# Patient Record
Sex: MALE | Race: WHITE | NOT HISPANIC OR LATINO | Employment: OTHER | URBAN - METROPOLITAN AREA
[De-identification: names, ages, dates, MRNs, and addresses within clinical notes are randomized per-mention and may not be internally consistent; named-entity substitution may affect disease eponyms.]

---

## 2022-01-26 ENCOUNTER — LAB (OUTPATIENT)
Dept: LAB | Facility: HOSPITAL | Age: 72
End: 2022-01-26

## 2022-01-26 ENCOUNTER — OFFICE VISIT (OUTPATIENT)
Dept: FAMILY MEDICINE CLINIC | Facility: CLINIC | Age: 72
End: 2022-01-26

## 2022-01-26 VITALS
HEART RATE: 78 BPM | BODY MASS INDEX: 29.66 KG/M2 | RESPIRATION RATE: 18 BRPM | SYSTOLIC BLOOD PRESSURE: 136 MMHG | HEIGHT: 72 IN | TEMPERATURE: 97.8 F | OXYGEN SATURATION: 99 % | DIASTOLIC BLOOD PRESSURE: 70 MMHG | WEIGHT: 219 LBS

## 2022-01-26 DIAGNOSIS — F32.A DEPRESSION, UNSPECIFIED DEPRESSION TYPE: ICD-10-CM

## 2022-01-26 DIAGNOSIS — R21 RASH: ICD-10-CM

## 2022-01-26 DIAGNOSIS — N40.0 BENIGN PROSTATIC HYPERPLASIA, UNSPECIFIED WHETHER LOWER URINARY TRACT SYMPTOMS PRESENT: Primary | ICD-10-CM

## 2022-01-26 DIAGNOSIS — I10 PRIMARY HYPERTENSION: ICD-10-CM

## 2022-01-26 PROCEDURE — 99204 OFFICE O/P NEW MOD 45 MIN: CPT | Performed by: STUDENT IN AN ORGANIZED HEALTH CARE EDUCATION/TRAINING PROGRAM

## 2022-01-26 RX ORDER — TRIAMCINOLONE ACETONIDE 0.25 MG/G
1 OINTMENT TOPICAL 2 TIMES DAILY
Qty: 1 EACH | Refills: 0 | Status: SHIPPED | OUTPATIENT
Start: 2022-01-26 | End: 2022-06-21

## 2022-01-26 RX ORDER — OLMESARTAN MEDOXOMIL 40 MG/1
TABLET ORAL
COMMUNITY
Start: 2010-01-01 | End: 2022-06-13 | Stop reason: SDUPTHER

## 2022-01-26 RX ORDER — TRAMADOL HYDROCHLORIDE 50 MG/1
TABLET ORAL
COMMUNITY
End: 2022-01-26

## 2022-01-26 RX ORDER — TADALAFIL 5 MG/1
TABLET ORAL
COMMUNITY
End: 2022-06-21

## 2022-01-26 RX ORDER — ESZOPICLONE 3 MG/1
TABLET, FILM COATED ORAL
COMMUNITY
Start: 2021-04-01 | End: 2022-01-26

## 2022-01-26 RX ORDER — TAMSULOSIN HYDROCHLORIDE 0.4 MG/1
CAPSULE ORAL
COMMUNITY
Start: 2015-01-01 | End: 2022-06-21 | Stop reason: SDUPTHER

## 2022-01-26 RX ORDER — TRIAMCINOLONE ACETONIDE 1 MG/G
CREAM TOPICAL
COMMUNITY
Start: 2019-01-01 | End: 2022-01-26

## 2022-01-26 RX ORDER — SERTRALINE HYDROCHLORIDE 100 MG/1
TABLET, FILM COATED ORAL
COMMUNITY
Start: 2020-01-01 | End: 2022-06-21

## 2022-01-26 RX ORDER — CEPHALEXIN 500 MG/1
500 CAPSULE ORAL 2 TIMES DAILY
Qty: 14 CAPSULE | Refills: 0 | Status: SHIPPED | OUTPATIENT
Start: 2022-01-26 | End: 2022-06-13

## 2022-01-26 NOTE — PROGRESS NOTES
New Patient Office Visit      Patient Name: Justus Bishop  : 1950   MRN: [id number]     Chief Complaint:  staff infection (fell in briar patch/ poison oak 6 mnths ago.)     History of Present Illness:   He just moved to [place] in December.     Rash:  He has a rash on his chin and face that he was prescribed by dermatology. He says this started when he got into poison oak on his face about 7 months ago. He was also given mupirocin which he states he cannot take as it gave his allergic reaction. Says his rash is way better now than it was. He is currently going through a divorce and with a lot of stress. Says the rash has improved as the stress improved. Says he has been on bactrim and doxycycline on and off for two years for skin issues he believes to be poison oak related.     Says he has a high red and white blood cell count and has underwent phlebotomy in the past. Has not seen hematology.     cristy wallace pcp [facility]. Request records.      Taking lunesta for insomnia. Discussed I do not prescribe this medication given it's potential dangerous side effects and he says he is okay without it as he has not taken it the past few days as the seroquel helps him sleep.     Asthma  Uses breo occasionally. Denies copd    htn  Taking benicar for htn. Checks at home and says it has been stable in 130s. No side effects from that medication.     Taking ssri (for 2 years) and seroquel (less than four months). He says he was started on this medication to keep his mood in control as he was going through a divorce.    He has a history of opioid dependence and says he had an issue with alcohol in the past. Declines any alcohol use or substance use at this time. He did not undergo intensive treatment but says he stopped on his own. This was 2 years ago being on opioid treatment for chronic low back pain. He denies any history of hallucinations or delusions. He denies any side effects  "from the [facility].   Saw dr martinez for psychiatry. Request records.     He was diagnosed with essential tremor and was placed on primidone which gave him thoughts of suicide so he stopped taking. He denies any suicidal or homicidal thoughts at this time.     He also takes cialis and flomax for bph. He did have a [facility] health doctor in [place] and needs to establish with one here.         Subjective        History reviewed. No pertinent past medical history.    History reviewed. No pertinent surgical history.    History reviewed. No pertinent family history.    Social History     Socioeconomic History   • Marital status:           Current Outpatient Medications:   •  Fluticasone Furoate-Vilanterol (Breo Ellipta) 200-25 MCG/INH inhaler, Breo Ellipta 200 mcg-25 mcg/dose powder for inhalation, Disp: , Rfl:   •  mupirocin (BACTROBAN) 2 % ointment, mupirocin 2 % topical ointment, Disp: , Rfl:   •  olmesartan (BENICAR) 40 MG tablet, olmesartan 40 mg tablet, Disp: , Rfl:   •  QUETIAPINE FUMARATE ER PO, , Disp: , Rfl:   •  sertraline (ZOLOFT) 100 MG tablet, sertraline 100 mg tablet, Disp: , Rfl:   •  tadalafil (CIALIS) 5 MG tablet, tadalafil 5 mg tablet  Take 1 tablet(s) every day by oral route for 90 days., Disp: , Rfl:   •  tamsulosin (FLOMAX) 0.4 MG capsule 24 hr capsule, tamsulosin 0.4 mg capsule, Disp: , Rfl:   •  cephalexin (Keflex) 500 MG capsule, Take 1 capsule by mouth 2 (Two) Times a Day., Disp: 14 capsule, Rfl: 0  •  triamcinolone (KENALOG) 0.025 % ointment, Apply 1 application topically to the appropriate area as directed 2 (Two) Times a Day., Disp: 1 each, Rfl: 0    No Known Allergies    Objective     Physical Exam:  Vitals:    01/26/22 1105   BP: 136/70   Pulse: 78   Resp: 18   Temp: 97.8 °F (36.6 °C)   SpO2: 99%   Weight: 99.3 kg (219 lb)   Height: 182.9 cm (72\")      Body mass index is 29.7 kg/m².     Physical Exam  Constitutional:       General: He is not in acute distress.     Appearance: Normal " appearance.   HENT:      Head: Normocephalic and atraumatic.   Eyes:      Extraocular Movements: Extraocular movements intact.   Cardiovascular:      Rate and Rhythm: Normal rate and regular rhythm.      Heart sounds: No murmur heard.      Pulmonary:      Effort: Pulmonary effort is normal. No respiratory distress.      Breath sounds: Normal breath sounds.   Abdominal:      General: Abdomen is flat.   Musculoskeletal:         General: No swelling.      Cervical back: Normal range of motion.   Skin:     Comments: He has some deep excoriations of his chin and buccal area bilaterally with erythema. Several excoriations appear to have some purulent yellowing.No cyst or abscess.    Neurological:      Mental Status: He is alert.   Psychiatric:         Mood and Affect: Mood normal.              Assessment / Plan      Assessment/Plan:   Diagnoses and all orders for this visit:    1. Benign prostatic hyperplasia, unspecified whether lower urinary tract symptoms present (Primary)  -     Ambulatory Referral to Urology  -     Comprehensive Metabolic Panel; Future    2. Rash  -     Cancel: Ambulatory Referral to Dermatology  -     Ambulatory Referral to Dermatology    3. Primary hypertension  -     CBC (No Diff); Future  -     Lipid Panel; Future  -     TSH Rfx On Abnormal To Free T4; Future    4. Depression, unspecified depression type  -     Ambulatory Referral to Behavioral Health    Other orders  -     cephalexin (Keflex) 500 MG capsule; Take 1 capsule by mouth 2 (Two) Times a Day.  Dispense: 14 capsule; Refill: 0  -     triamcinolone (KENALOG) 0.025 % ointment; Apply 1 application topically to the appropriate area as directed 2 (Two) Times a Day.  Dispense: 1 each; Refill: 0         Return in about 1 month (around 2/26/2022).       Misty Mai D.O.  [facility] [place]

## 2022-01-27 LAB
ALBUMIN SERPL-MCNC: 4 G/DL (ref 3.5–5.2)
ALBUMIN/GLOB SERPL: 1.2 G/DL
ALP SERPL-CCNC: 78 U/L (ref 39–117)
ALT SERPL-CCNC: 14 U/L (ref 1–41)
AST SERPL-CCNC: 12 U/L (ref 1–40)
BILIRUB SERPL-MCNC: 0.3 MG/DL (ref 0–1.2)
BUN SERPL-MCNC: 12 MG/DL (ref 8–23)
BUN/CREAT SERPL: 13.6 (ref 7–25)
CALCIUM SERPL-MCNC: 9.5 MG/DL (ref 8.6–10.5)
CHLORIDE SERPL-SCNC: 106 MMOL/L (ref 98–107)
CHOLEST SERPL-MCNC: 216 MG/DL (ref 0–200)
CO2 SERPL-SCNC: 24.8 MMOL/L (ref 22–29)
CREAT SERPL-MCNC: 0.88 MG/DL (ref 0.76–1.27)
ERYTHROCYTE [DISTWIDTH] IN BLOOD BY AUTOMATED COUNT: 13 % (ref 12.3–15.4)
GLOBULIN SER CALC-MCNC: 3.4 GM/DL
GLUCOSE SERPL-MCNC: 95 MG/DL (ref 65–99)
HCT VFR BLD AUTO: 46.9 % (ref 37.5–51)
HDLC SERPL-MCNC: 42 MG/DL (ref 40–60)
HGB BLD-MCNC: 16.4 G/DL (ref 13–17.7)
LDLC SERPL CALC-MCNC: 149 MG/DL (ref 0–100)
MCH RBC QN AUTO: 31.6 PG (ref 26.6–33)
MCHC RBC AUTO-ENTMCNC: 35 G/DL (ref 31.5–35.7)
MCV RBC AUTO: 90.4 FL (ref 79–97)
PLATELET # BLD AUTO: 418 10*3/MM3 (ref 140–450)
POTASSIUM SERPL-SCNC: 4.2 MMOL/L (ref 3.5–5.2)
PROT SERPL-MCNC: 7.4 G/DL (ref 6–8.5)
RBC # BLD AUTO: 5.19 10*6/MM3 (ref 4.14–5.8)
SODIUM SERPL-SCNC: 137 MMOL/L (ref 136–145)
TRIGL SERPL-MCNC: 139 MG/DL (ref 0–150)
TSH SERPL DL<=0.005 MIU/L-ACNC: 1.12 UIU/ML (ref 0.27–4.2)
VLDLC SERPL CALC-MCNC: 25 MG/DL (ref 5–40)
WBC # BLD AUTO: 12.91 10*3/MM3 (ref 3.4–10.8)

## 2022-02-14 ENCOUNTER — TELEPHONE (OUTPATIENT)
Dept: FAMILY MEDICINE CLINIC | Facility: CLINIC | Age: 72
End: 2022-02-14

## 2022-02-14 NOTE — TELEPHONE ENCOUNTER
----- Message from Misty Mai DO sent at 2/11/2022  8:20 AM EST -----  Regarding: FW: Two Requests      ----- Message -----  From: Lulu Silva CMA  Sent: 2/10/2022   4:21 PM EST  To: Misty Mai DO  Subject: FW: Two Requests                                   ----- Message -----  From: Justus Bishop  Sent: 2/10/2022   4:01 PM EST  To: [employer]  Subject: Two Requests                                     Felecia Mai,    I a having issues with scheduling the [facility], playing phone tag, and I misplaced the callback number. Please re request that they reach out to me.    Also, the Perrigo ointment has helped, if a refill is possible, please.    I have seen Hamilton Csatle at [facility], and healing has improved, thanks. Also I am on track for a Urology Appointment at the end of the month.    Thanks so much for your help!    Jordan Bishop

## 2022-06-13 ENCOUNTER — OFFICE VISIT (OUTPATIENT)
Dept: FAMILY MEDICINE CLINIC | Facility: CLINIC | Age: 72
End: 2022-06-13

## 2022-06-13 ENCOUNTER — LAB (OUTPATIENT)
Dept: LAB | Facility: HOSPITAL | Age: 72
End: 2022-06-13

## 2022-06-13 VITALS
WEIGHT: 230 LBS | SYSTOLIC BLOOD PRESSURE: 146 MMHG | TEMPERATURE: 99.5 F | BODY MASS INDEX: 31.15 KG/M2 | OXYGEN SATURATION: 96 % | HEIGHT: 72 IN | HEART RATE: 81 BPM | RESPIRATION RATE: 16 BRPM | DIASTOLIC BLOOD PRESSURE: 90 MMHG

## 2022-06-13 DIAGNOSIS — R60.9 SWELLING: Primary | ICD-10-CM

## 2022-06-13 DIAGNOSIS — R60.9 SWELLING: ICD-10-CM

## 2022-06-13 DIAGNOSIS — R79.9 ABNORMAL FINDING OF BLOOD CHEMISTRY, UNSPECIFIED: ICD-10-CM

## 2022-06-13 DIAGNOSIS — R06.02 SOB (SHORTNESS OF BREATH): ICD-10-CM

## 2022-06-13 DIAGNOSIS — R60.0 LOCALIZED EDEMA: ICD-10-CM

## 2022-06-13 LAB
ALBUMIN SERPL-MCNC: 4.1 G/DL (ref 3.5–5.2)
ALBUMIN UR-MCNC: 5.3 MG/DL
ALBUMIN/GLOB SERPL: 1.2 G/DL
ALP SERPL-CCNC: 77 U/L (ref 39–117)
ALT SERPL W P-5'-P-CCNC: 16 U/L (ref 1–41)
ANION GAP SERPL CALCULATED.3IONS-SCNC: 11.8 MMOL/L (ref 5–15)
AST SERPL-CCNC: 14 U/L (ref 1–40)
BILIRUB SERPL-MCNC: 0.5 MG/DL (ref 0–1.2)
BUN SERPL-MCNC: 11 MG/DL (ref 8–23)
BUN/CREAT SERPL: 11.5 (ref 7–25)
CALCIUM SPEC-SCNC: 9.2 MG/DL (ref 8.6–10.5)
CHLORIDE SERPL-SCNC: 101 MMOL/L (ref 98–107)
CO2 SERPL-SCNC: 24.2 MMOL/L (ref 22–29)
CREAT SERPL-MCNC: 0.96 MG/DL (ref 0.76–1.27)
CREAT UR-MCNC: 163.4 MG/DL
DEPRECATED RDW RBC AUTO: 47.3 FL (ref 37–54)
EGFRCR SERPLBLD CKD-EPI 2021: 84.5 ML/MIN/1.73
ERYTHROCYTE [DISTWIDTH] IN BLOOD BY AUTOMATED COUNT: 14.9 % (ref 12.3–15.4)
GLOBULIN UR ELPH-MCNC: 3.4 GM/DL
GLUCOSE SERPL-MCNC: 86 MG/DL (ref 65–99)
HBA1C MFR BLD: 5.8 % (ref 4.8–5.6)
HCT VFR BLD AUTO: 48.1 % (ref 37.5–51)
HGB BLD-MCNC: 16.1 G/DL (ref 13–17.7)
MCH RBC QN AUTO: 29.3 PG (ref 26.6–33)
MCHC RBC AUTO-ENTMCNC: 33.5 G/DL (ref 31.5–35.7)
MCV RBC AUTO: 87.6 FL (ref 79–97)
MICROALBUMIN/CREAT UR: 32.4 MG/G
PLATELET # BLD AUTO: 433 10*3/MM3 (ref 140–450)
PMV BLD AUTO: 9.1 FL (ref 6–12)
POTASSIUM SERPL-SCNC: 3.7 MMOL/L (ref 3.5–5.2)
PROT SERPL-MCNC: 7.5 G/DL (ref 6–8.5)
RBC # BLD AUTO: 5.49 10*6/MM3 (ref 4.14–5.8)
SODIUM SERPL-SCNC: 137 MMOL/L (ref 136–145)
TSH SERPL DL<=0.05 MIU/L-ACNC: 1.85 UIU/ML (ref 0.27–4.2)
WBC NRBC COR # BLD: 12.67 10*3/MM3 (ref 3.4–10.8)

## 2022-06-13 PROCEDURE — 85027 COMPLETE CBC AUTOMATED: CPT

## 2022-06-13 PROCEDURE — 83036 HEMOGLOBIN GLYCOSYLATED A1C: CPT

## 2022-06-13 PROCEDURE — 80053 COMPREHEN METABOLIC PANEL: CPT

## 2022-06-13 PROCEDURE — 99214 OFFICE O/P EST MOD 30 MIN: CPT | Performed by: STUDENT IN AN ORGANIZED HEALTH CARE EDUCATION/TRAINING PROGRAM

## 2022-06-13 PROCEDURE — 82043 UR ALBUMIN QUANTITATIVE: CPT

## 2022-06-13 PROCEDURE — 84443 ASSAY THYROID STIM HORMONE: CPT

## 2022-06-13 PROCEDURE — 82570 ASSAY OF URINE CREATININE: CPT

## 2022-06-13 RX ORDER — OLMESARTAN MEDOXOMIL 40 MG/1
40 TABLET ORAL DAILY
Qty: 30 TABLET | Refills: 2 | Status: SHIPPED | OUTPATIENT
Start: 2022-06-13 | End: 2022-09-20

## 2022-06-13 RX ORDER — ALBUTEROL SULFATE 90 UG/1
2 AEROSOL, METERED RESPIRATORY (INHALATION) EVERY 4 HOURS PRN
Qty: 1 G | Refills: 0 | Status: SHIPPED | OUTPATIENT
Start: 2022-06-13

## 2022-06-13 RX ORDER — CEPHALEXIN 500 MG/1
500 CAPSULE ORAL 4 TIMES DAILY
Qty: 20 CAPSULE | Refills: 0 | Status: SHIPPED | OUTPATIENT
Start: 2022-06-13 | End: 2022-07-21

## 2022-06-13 NOTE — PROGRESS NOTES
"Chief Complaint  Hypertension and Right foot swollen     History of Present Illness    Foot issue  Says for the past couple weeks he has noticed a cut on his toe on the right side. After noticing the cut he noticed he has some swelling inhis right leg more so than left, but left is swollen as well. He says this swelling couldve been present for a long time, he is unsure. There is not pain. No fever. No injury that he is aware of.     He has had stress test and cath in the past and was told he \"looks great\" . Will request records. He says he has some chronic swelling in his legs worse on the right. He declines chest pain, palpitations, no orthopnea. He does have some sob he contributes to asthma.     He says he has a history of asthma. He says he has been out of albuterol. He needs refill on breo and albuterol.     htn  He has not taken his benicar today. He had been on benicar and hctz in the past but did not mention the hctz in the past to me as he did not want to be on it.     He says he [facility] on his face. He was given doxycycline. He has follow up apt with derm in August. He says the skin of his face is improved.         PCP was dr wallace in [place], I did not receive records, will request         The following portions of the patient's history were reviewed and updated as appropriate: allergies, current medications, past family history, past medical history, past social history, past surgical history, and problem list.    OBJECTIVE:  /90   Pulse 81   Temp 99.5 °F (37.5 °C)   Resp 16   Ht 182.9 cm (72\")   Wt 104 kg (230 lb)   SpO2 96%   BMI 31.19 kg/m²       Physical Exam  Constitutional:       General: He is not in acute distress.  HENT:      Head: Normocephalic and atraumatic.   Eyes:      Extraocular Movements: Extraocular movements intact.   Cardiovascular:      Rate and Rhythm: Normal rate and regular rhythm.      Heart sounds: No murmur " heard.  Pulmonary:      Effort: Pulmonary effort is normal. No respiratory distress.      Breath sounds: Normal breath sounds. No wheezing, rhonchi or rales.   Abdominal:      General: Bowel sounds are normal.      Palpations: Abdomen is soft.      Tenderness: There is no abdominal tenderness.   Musculoskeletal:         General: Swelling (bilateral pitting edema is present 1+ . right ankle with more than left) present. No tenderness.      Cervical back: Normal range of motion.   Skin:     Comments: Skin changes of face are present  His 3rd toe on right just below the toe nail there is an abrasion without pus or drainage. There is mild erythema. No foreign bodies present. It is not painful on palpation. The toe is not swollen   Neurological:      General: No focal deficit present.      Mental Status: He is alert. Mental status is at baseline.   Psychiatric:      Comments: Appears anxious                    Assessment and Plan   Diagnoses and all orders for this visit:    1. Swelling (Primary)  -     XR Foot 3+ View Right (In Office)  -     Microalbumin / Creatinine Urine Ratio - Urine, Clean Catch; Future  -     CBC (No Diff); Future  -     Comprehensive Metabolic Panel; Future  -     Hemoglobin A1c; Future  -     TSH Rfx On Abnormal To Free T4; Future  -     Duplex Venous Lower Extremity - Bilateral CAR; Future  -     Adult Transthoracic Echo Complete W/ Cont if Necessary Per Protocol; Future    2. Abnormal finding of blood chemistry, unspecified   -     Hemoglobin A1c; Future    3. Localized edema   -     Duplex Venous Lower Extremity - Bilateral CAR; Future    4. SOB (shortness of breath)  -     Adult Transthoracic Echo Complete W/ Cont if Necessary Per Protocol; Future    Other orders  -     albuterol sulfate  (90 Base) MCG/ACT inhaler; Inhale 2 puffs Every 4 (Four) Hours As Needed for Wheezing.  Dispense: 1 g; Refill: 0  -     Fluticasone Furoate-Vilanterol (Breo Ellipta) 200-25 MCG/INH inhaler; Inhale 1  "puff Daily.  Dispense: 1 each; Refill: 2  -     cephalexin (Keflex) 500 MG capsule; Take 1 capsule by mouth 4 (Four) Times a Day.  Dispense: 20 capsule; Refill: 0  -     olmesartan (BENICAR) 40 MG tablet; Take 1 tablet by mouth Daily.  Dispense: 30 tablet; Refill: 2      Leg swelling  Bilateral and unknown chronicity per patient. Obtain bilateral US. Will check microalbumin, lfts.   Given SOB will send for echo to rule out cardiac cause although he has a history of underlying asthma.    Will give keflex for abrasion of his toe. Will xray right foot given more swelling right than left and follow up on this in one week.     htn  Start back on benicar. Check cmp. F/u bp in one week. Advised him to check bp daily at home and bring to next visit.     Recheck white count today.   Have requested records again, did not receive before.     He denies alcohol use, says he only drinks \" a beer occasionally\".   He declines hiv hepatitis screening.     Discussed with patient I would like to see him back in one week to follow up on these issues.     Return in about 1 week (around 6/20/2022).       Misty Mai D.O.  [facility] [place]     "

## 2022-06-14 DIAGNOSIS — R80.9 PROTEINURIA, UNSPECIFIED TYPE: Primary | ICD-10-CM

## 2022-06-21 ENCOUNTER — LAB (OUTPATIENT)
Dept: LAB | Facility: HOSPITAL | Age: 72
End: 2022-06-21

## 2022-06-21 ENCOUNTER — OFFICE VISIT (OUTPATIENT)
Dept: FAMILY MEDICINE CLINIC | Facility: CLINIC | Age: 72
End: 2022-06-21

## 2022-06-21 VITALS
SYSTOLIC BLOOD PRESSURE: 148 MMHG | RESPIRATION RATE: 16 BRPM | BODY MASS INDEX: 31.69 KG/M2 | TEMPERATURE: 97.9 F | HEART RATE: 90 BPM | DIASTOLIC BLOOD PRESSURE: 88 MMHG | WEIGHT: 234 LBS | HEIGHT: 72 IN | OXYGEN SATURATION: 98 %

## 2022-06-21 DIAGNOSIS — D72.829 LEUKOCYTOSIS, UNSPECIFIED TYPE: ICD-10-CM

## 2022-06-21 DIAGNOSIS — M25.50 ARTHRALGIA, UNSPECIFIED JOINT: ICD-10-CM

## 2022-06-21 DIAGNOSIS — Z23 NEED FOR PNEUMOCOCCAL VACCINE: Primary | ICD-10-CM

## 2022-06-21 LAB
BASOPHILS # BLD AUTO: 0.14 10*3/MM3 (ref 0–0.2)
BASOPHILS NFR BLD AUTO: 1.2 % (ref 0–1.5)
DEPRECATED RDW RBC AUTO: 44.5 FL (ref 37–54)
EOSINOPHIL # BLD AUTO: 0.52 10*3/MM3 (ref 0–0.4)
EOSINOPHIL NFR BLD AUTO: 4.6 % (ref 0.3–6.2)
ERYTHROCYTE [DISTWIDTH] IN BLOOD BY AUTOMATED COUNT: 14.8 % (ref 12.3–15.4)
HCT VFR BLD AUTO: 47.9 % (ref 37.5–51)
HGB BLD-MCNC: 17.2 G/DL (ref 13–17.7)
IMM GRANULOCYTES # BLD AUTO: 0.04 10*3/MM3 (ref 0–0.05)
IMM GRANULOCYTES NFR BLD AUTO: 0.4 % (ref 0–0.5)
LYMPHOCYTES # BLD AUTO: 3.29 10*3/MM3 (ref 0.7–3.1)
LYMPHOCYTES NFR BLD AUTO: 28.9 % (ref 19.6–45.3)
MCH RBC QN AUTO: 30.4 PG (ref 26.6–33)
MCHC RBC AUTO-ENTMCNC: 35.9 G/DL (ref 31.5–35.7)
MCV RBC AUTO: 84.8 FL (ref 79–97)
MONOCYTES # BLD AUTO: 1.45 10*3/MM3 (ref 0.1–0.9)
MONOCYTES NFR BLD AUTO: 12.8 % (ref 5–12)
NEUTROPHILS NFR BLD AUTO: 5.93 10*3/MM3 (ref 1.7–7)
NEUTROPHILS NFR BLD AUTO: 52.1 % (ref 42.7–76)
NRBC BLD AUTO-RTO: 0 /100 WBC (ref 0–0.2)
PATHOLOGY REVIEW: YES
PLATELET # BLD AUTO: 425 10*3/MM3 (ref 140–450)
PMV BLD AUTO: 9.3 FL (ref 6–12)
RBC # BLD AUTO: 5.65 10*6/MM3 (ref 4.14–5.8)
WBC NRBC COR # BLD: 11.37 10*3/MM3 (ref 3.4–10.8)

## 2022-06-21 PROCEDURE — 36415 COLL VENOUS BLD VENIPUNCTURE: CPT

## 2022-06-21 PROCEDURE — 85025 COMPLETE CBC W/AUTO DIFF WBC: CPT

## 2022-06-21 PROCEDURE — 99214 OFFICE O/P EST MOD 30 MIN: CPT | Performed by: STUDENT IN AN ORGANIZED HEALTH CARE EDUCATION/TRAINING PROGRAM

## 2022-06-21 RX ORDER — TAMSULOSIN HYDROCHLORIDE 0.4 MG/1
1 CAPSULE ORAL DAILY
Qty: 30 CAPSULE | Refills: 2 | Status: SHIPPED | OUTPATIENT
Start: 2022-06-21 | End: 2022-07-20 | Stop reason: SDUPTHER

## 2022-06-21 RX ORDER — HYDROCHLOROTHIAZIDE 12.5 MG/1
12.5 TABLET ORAL DAILY
Qty: 30 TABLET | Refills: 1 | Status: SHIPPED | OUTPATIENT
Start: 2022-06-21 | End: 2022-07-21 | Stop reason: SDUPTHER

## 2022-06-21 RX ORDER — ATORVASTATIN CALCIUM 20 MG/1
20 TABLET, FILM COATED ORAL DAILY
Qty: 90 TABLET | Refills: 0 | Status: SHIPPED | OUTPATIENT
Start: 2022-06-21 | End: 2022-09-20

## 2022-06-21 NOTE — PROGRESS NOTES
"Chief Complaint  Follow-up ( )    History of Present Illness      htn  bp not at goal  On benicar 40 mg daily  Will restart hctz       His foot is much improved. 3rd toe with well healed scar.     He does have pain in his joints with some occasional morning stiffness.     hld  Have recommended a statin medication.   He says he was on a medication for this in the past and is okay with starting back on something.     Leg swelling  He says echo is scheduled.   He did not go for doppler US of legs.   Given microalbuminuria he was sent to nephrology. Advised him to call and schedule.    Also advised him to call our office to schedule with urology and psychiatry. He quickly changes the subject when I advise these referrals/test. He says he is not taking his psychiatric medications at all right now and \"doesn't need them\".    He would like to start back on flomax for his prostate.         The following portions of the patient's history were reviewed and updated as appropriate: allergies, current medications, past family history, past medical history, past social history, past surgical history, and problem list.    OBJECTIVE:  /88   Pulse 90   Temp 97.9 °F (36.6 °C)   Resp 16   Ht 182.9 cm (72\")   Wt 106 kg (234 lb)   SpO2 98%   BMI 31.74 kg/m²       Physical Exam  Constitutional:       General: He is not in acute distress.     Appearance: Normal appearance.   HENT:      Head: Normocephalic and atraumatic.   Eyes:      Extraocular Movements: Extraocular movements intact.   Cardiovascular:      Rate and Rhythm: Normal rate and regular rhythm.      Heart sounds: No murmur heard.  Pulmonary:      Effort: Pulmonary effort is normal. No respiratory distress.      Breath sounds: Normal breath sounds.   Abdominal:      General: Abdomen is flat.   Musculoskeletal:      Cervical back: Normal range of motion.      Comments: Has around 1 + pitting edema bilateral lower extremities equal in both legs , seems mildly  " improved since last visit   Skin:     Comments: Healing scar present right 3rd toe no openings or discharge. No warmth redness or swelling. No pain.      Neurological:      General: No focal deficit present.      Mental Status: He is alert.   Psychiatric:         Mood and Affect: Mood normal.                    Assessment and Plan   Diagnoses and all orders for this visit:    1. Need for pneumococcal vaccine (Primary)    2. Leukocytosis, unspecified type  -     CBC & Differential; Future  -     Peripheral Blood Smear; Future    3. Arthralgia, unspecified joint  -     CEDRIC; Future  -     Rheumatoid Factor; Future  -     Sedimentation rate, automated; Future  -     C-reactive protein; Future  -     HLA-B27 Antigen; Future    Other orders  -     hydroCHLOROthiazide (HYDRODIURIL) 12.5 MG tablet; Take 1 tablet by mouth Daily.  Dispense: 30 tablet; Refill: 1  -     tamsulosin (FLOMAX) 0.4 MG capsule 24 hr capsule; Take 1 capsule by mouth Daily.  Dispense: 30 capsule; Refill: 2  -     atorvastatin (Lipitor) 20 MG tablet; Take 1 tablet by mouth Daily.  Dispense: 90 tablet; Refill: 0      Still have not received records from pcp dr wallace in [place] will request again    Has f/u with derm for facial rash, doesn't appear to be infected today. Advised him to avoid steroids which can cause skin scarring and thinning which he already has some degree of chronically and to treat as directed by derm.     Counseled patient on going to his recommended referrals per above.     Return in about 1 month (around 7/21/2022).       Misty Mai D.O.  [facility] [place]

## 2022-06-22 LAB
LAB AP CASE REPORT: NORMAL
PATH REPORT.FINAL DX SPEC: NORMAL

## 2022-06-23 DIAGNOSIS — D72.829 LEUKOCYTOSIS, UNSPECIFIED TYPE: Primary | ICD-10-CM

## 2022-06-27 ENCOUNTER — HOSPITAL ENCOUNTER (OUTPATIENT)
Dept: CARDIOLOGY | Facility: HOSPITAL | Age: 72
Discharge: HOME | End: 2022-06-27

## 2022-06-27 VITALS — BODY MASS INDEX: 31.65 KG/M2 | HEIGHT: 72 IN | WEIGHT: 233.69 LBS

## 2022-06-27 VITALS — BODY MASS INDEX: 31.65 KG/M2 | WEIGHT: 233.69 LBS | HEIGHT: 72 IN

## 2022-06-27 DIAGNOSIS — R06.02 SOB (SHORTNESS OF BREATH): ICD-10-CM

## 2022-06-27 DIAGNOSIS — R60.9 SWELLING: ICD-10-CM

## 2022-06-27 DIAGNOSIS — R60.0 LOCALIZED EDEMA: ICD-10-CM

## 2022-06-27 LAB
BH CV ECHO MEAS - AO MAX PG: 7.5 MMHG
BH CV ECHO MEAS - AO MEAN PG: 4.2 MMHG
BH CV ECHO MEAS - AO ROOT AREA (BSA CORRECTED): 1.6 CM2
BH CV ECHO MEAS - AO ROOT DIAM: 3.6 CM
BH CV ECHO MEAS - AO V2 MAX: 136.5 CM/SEC
BH CV ECHO MEAS - AO V2 VTI: 23.9 CM
BH CV ECHO MEAS - AVA(I,D): 2.8 CM2
BH CV ECHO MEAS - EDV(CUBED): 159.8 ML
BH CV ECHO MEAS - EDV(MOD-SP2): 115 ML
BH CV ECHO MEAS - EDV(MOD-SP4): 123 ML
BH CV ECHO MEAS - EF(MOD-BP): 58 %
BH CV ECHO MEAS - EF(MOD-SP2): 59.1 %
BH CV ECHO MEAS - EF(MOD-SP4): 56.1 %
BH CV ECHO MEAS - ESV(CUBED): 50 ML
BH CV ECHO MEAS - ESV(MOD-SP2): 47 ML
BH CV ECHO MEAS - ESV(MOD-SP4): 54 ML
BH CV ECHO MEAS - FS: 32.1 %
BH CV ECHO MEAS - IVS/LVPW: 1.12 CM
BH CV ECHO MEAS - IVSD: 0.93 CM
BH CV ECHO MEAS - LA DIMENSION: 3.5 CM
BH CV ECHO MEAS - LV MASS(C)D: 176.2 GRAMS
BH CV ECHO MEAS - LV MAX PG: 4.9 MMHG
BH CV ECHO MEAS - LV MEAN PG: 2.9 MMHG
BH CV ECHO MEAS - LV V1 MAX: 111.1 CM/SEC
BH CV ECHO MEAS - LV V1 VTI: 21.1 CM
BH CV ECHO MEAS - LVIDD: 5.4 CM
BH CV ECHO MEAS - LVIDS: 3.7 CM
BH CV ECHO MEAS - LVOT AREA: 3.2 CM2
BH CV ECHO MEAS - LVOT DIAM: 2.02 CM
BH CV ECHO MEAS - LVPWD: 0.83 CM
BH CV ECHO MEAS - MV A MAX VEL: 88.8 CM/SEC
BH CV ECHO MEAS - MV DEC TIME: 0.31 MSEC
BH CV ECHO MEAS - MV E MAX VEL: 56.8 CM/SEC
BH CV ECHO MEAS - MV E/A: 0.64
BH CV ECHO MEAS - PA ACC SLOPE: 743.1 CM/SEC2
BH CV ECHO MEAS - PA ACC TIME: 0.08 SEC
BH CV ECHO MEAS - PA PR(ACCEL): 41 MMHG
BH CV ECHO MEAS - RAP SYSTOLE: 3 MMHG
BH CV ECHO MEAS - RVSP: 23 MMHG
BH CV ECHO MEAS - SV(LVOT): 67.8 ML
BH CV ECHO MEAS - SV(MOD-SP2): 68 ML
BH CV ECHO MEAS - SV(MOD-SP4): 69 ML
BH CV ECHO MEAS - TAPSE (>1.6): 2.1 CM
BH CV ECHO MEAS - TR MAX PG: 20.3 MMHG
BH CV ECHO MEAS - TR MAX VEL: 225 CM/SEC
BH CV LOWER VASCULAR LEFT COMMON FEMORAL AUGMENT: NORMAL
BH CV LOWER VASCULAR LEFT COMMON FEMORAL COMPRESS: NORMAL
BH CV LOWER VASCULAR LEFT COMMON FEMORAL PHASIC: NORMAL
BH CV LOWER VASCULAR LEFT COMMON FEMORAL SPONT: NORMAL
BH CV LOWER VASCULAR LEFT DISTAL FEMORAL AUGMENT: NORMAL
BH CV LOWER VASCULAR LEFT DISTAL FEMORAL COMPRESS: NORMAL
BH CV LOWER VASCULAR LEFT DISTAL FEMORAL PHASIC: NORMAL
BH CV LOWER VASCULAR LEFT DISTAL FEMORAL SPONT: NORMAL
BH CV LOWER VASCULAR LEFT GASTRONEMIUS COMPRESS: NORMAL
BH CV LOWER VASCULAR LEFT GREATER SAPH AK COMPRESS: NORMAL
BH CV LOWER VASCULAR LEFT GREATER SAPH BK COMPRESS: NORMAL
BH CV LOWER VASCULAR LEFT LESSER SAPH COMPRESS: NORMAL
BH CV LOWER VASCULAR LEFT MID FEMORAL AUGMENT: NORMAL
BH CV LOWER VASCULAR LEFT MID FEMORAL COMPRESS: NORMAL
BH CV LOWER VASCULAR LEFT MID FEMORAL PHASIC: NORMAL
BH CV LOWER VASCULAR LEFT MID FEMORAL SPONT: NORMAL
BH CV LOWER VASCULAR LEFT PERONEAL COMPRESS: NORMAL
BH CV LOWER VASCULAR LEFT POPLITEAL AUGMENT: NORMAL
BH CV LOWER VASCULAR LEFT POPLITEAL COMPRESS: NORMAL
BH CV LOWER VASCULAR LEFT POPLITEAL PHASIC: NORMAL
BH CV LOWER VASCULAR LEFT POPLITEAL SPONT: NORMAL
BH CV LOWER VASCULAR LEFT POSTERIOR TIBIAL COMPRESS: NORMAL
BH CV LOWER VASCULAR LEFT PROFUNDA FEMORAL AUGMENT: NORMAL
BH CV LOWER VASCULAR LEFT PROFUNDA FEMORAL COMPRESS: NORMAL
BH CV LOWER VASCULAR LEFT PROFUNDA FEMORAL PHASIC: NORMAL
BH CV LOWER VASCULAR LEFT PROFUNDA FEMORAL SPONT: NORMAL
BH CV LOWER VASCULAR LEFT PROXIMAL FEMORAL AUGMENT: NORMAL
BH CV LOWER VASCULAR LEFT PROXIMAL FEMORAL COMPRESS: NORMAL
BH CV LOWER VASCULAR LEFT PROXIMAL FEMORAL PHASIC: NORMAL
BH CV LOWER VASCULAR LEFT PROXIMAL FEMORAL SPONT: NORMAL
BH CV LOWER VASCULAR LEFT SAPHENOFEMORAL JUNCTION AUGMENT: NORMAL
BH CV LOWER VASCULAR LEFT SAPHENOFEMORAL JUNCTION COMPRESS: NORMAL
BH CV LOWER VASCULAR LEFT SAPHENOFEMORAL JUNCTION PHASIC: NORMAL
BH CV LOWER VASCULAR LEFT SAPHENOFEMORAL JUNCTION SPONT: NORMAL
BH CV LOWER VASCULAR RIGHT COMMON FEMORAL AUGMENT: NORMAL
BH CV LOWER VASCULAR RIGHT COMMON FEMORAL COMPRESS: NORMAL
BH CV LOWER VASCULAR RIGHT COMMON FEMORAL PHASIC: NORMAL
BH CV LOWER VASCULAR RIGHT COMMON FEMORAL SPONT: NORMAL
BH CV LOWER VASCULAR RIGHT DISTAL FEMORAL AUGMENT: NORMAL
BH CV LOWER VASCULAR RIGHT DISTAL FEMORAL COMPRESS: NORMAL
BH CV LOWER VASCULAR RIGHT DISTAL FEMORAL PHASIC: NORMAL
BH CV LOWER VASCULAR RIGHT DISTAL FEMORAL SPONT: NORMAL
BH CV LOWER VASCULAR RIGHT GASTRONEMIUS COMPRESS: NORMAL
BH CV LOWER VASCULAR RIGHT GREATER SAPH AK COMPRESS: NORMAL
BH CV LOWER VASCULAR RIGHT GREATER SAPH BK COMPRESS: NORMAL
BH CV LOWER VASCULAR RIGHT LESSER SAPH COMPRESS: NORMAL
BH CV LOWER VASCULAR RIGHT MID FEMORAL AUGMENT: NORMAL
BH CV LOWER VASCULAR RIGHT MID FEMORAL COMPRESS: NORMAL
BH CV LOWER VASCULAR RIGHT MID FEMORAL PHASIC: NORMAL
BH CV LOWER VASCULAR RIGHT MID FEMORAL SPONT: NORMAL
BH CV LOWER VASCULAR RIGHT PERONEAL COMPRESS: NORMAL
BH CV LOWER VASCULAR RIGHT POPLITEAL AUGMENT: NORMAL
BH CV LOWER VASCULAR RIGHT POPLITEAL COMPRESS: NORMAL
BH CV LOWER VASCULAR RIGHT POPLITEAL PHASIC: NORMAL
BH CV LOWER VASCULAR RIGHT POPLITEAL SPONT: NORMAL
BH CV LOWER VASCULAR RIGHT POSTERIOR TIBIAL COMPRESS: NORMAL
BH CV LOWER VASCULAR RIGHT PROFUNDA FEMORAL AUGMENT: NORMAL
BH CV LOWER VASCULAR RIGHT PROFUNDA FEMORAL COMPRESS: NORMAL
BH CV LOWER VASCULAR RIGHT PROFUNDA FEMORAL PHASIC: NORMAL
BH CV LOWER VASCULAR RIGHT PROFUNDA FEMORAL SPONT: NORMAL
BH CV LOWER VASCULAR RIGHT PROXIMAL FEMORAL AUGMENT: NORMAL
BH CV LOWER VASCULAR RIGHT PROXIMAL FEMORAL COMPRESS: NORMAL
BH CV LOWER VASCULAR RIGHT PROXIMAL FEMORAL PHASIC: NORMAL
BH CV LOWER VASCULAR RIGHT PROXIMAL FEMORAL SPONT: NORMAL
BH CV LOWER VASCULAR RIGHT SAPHENOFEMORAL JUNCTION AUGMENT: NORMAL
BH CV LOWER VASCULAR RIGHT SAPHENOFEMORAL JUNCTION COMPRESS: NORMAL
BH CV LOWER VASCULAR RIGHT SAPHENOFEMORAL JUNCTION PHASIC: NORMAL
BH CV LOWER VASCULAR RIGHT SAPHENOFEMORAL JUNCTION SPONT: NORMAL
BH CV VAS BP LEFT ARM: NORMAL MMHG
BH CV XLRA - RV BASE: 4.2 CM
BH CV XLRA - RV LENGTH: 6.7 CM
BH CV XLRA - RV MID: 3.1 CM
BH CV XLRA - TDI S': 10.9 CM/SEC
LEFT ATRIUM VOLUME INDEX: 18.4 ML/M2
MAXIMAL PREDICTED HEART RATE: 149 BPM
MAXIMAL PREDICTED HEART RATE: 149 BPM
STRESS TARGET HR: 127 BPM
STRESS TARGET HR: 127 BPM

## 2022-06-27 PROCEDURE — 93970 EXTREMITY STUDY: CPT

## 2022-06-27 PROCEDURE — 93306 TTE W/DOPPLER COMPLETE: CPT

## 2022-06-27 PROCEDURE — 93306 TTE W/DOPPLER COMPLETE: CPT | Performed by: INTERNAL MEDICINE

## 2022-06-27 PROCEDURE — 93970 EXTREMITY STUDY: CPT | Performed by: INTERNAL MEDICINE

## 2022-06-28 ENCOUNTER — TELEPHONE (OUTPATIENT)
Dept: FAMILY MEDICINE CLINIC | Facility: CLINIC | Age: 72
End: 2022-06-28

## 2022-06-28 DIAGNOSIS — I51.89 DIASTOLIC DYSFUNCTION: Primary | ICD-10-CM

## 2022-06-28 NOTE — TELEPHONE ENCOUNTER
PATIENT IS CALLING REN BACK; NO ENCOUNTER NOTING REASON FOR CALL; HUB TRIED TO WARM TRANSFER TO OFFICE AND NO ONE WAS AVAILABLE TO ANSWER THE CALL

## 2022-07-20 ENCOUNTER — LAB (OUTPATIENT)
Dept: LAB | Facility: HOSPITAL | Age: 72
End: 2022-07-20

## 2022-07-20 ENCOUNTER — OFFICE VISIT (OUTPATIENT)
Dept: UROLOGY | Facility: CLINIC | Age: 72
End: 2022-07-20

## 2022-07-20 VITALS
BODY MASS INDEX: 31.42 KG/M2 | OXYGEN SATURATION: 95 % | SYSTOLIC BLOOD PRESSURE: 148 MMHG | HEART RATE: 107 BPM | DIASTOLIC BLOOD PRESSURE: 76 MMHG | WEIGHT: 232 LBS | HEIGHT: 72 IN

## 2022-07-20 DIAGNOSIS — N13.8 BPH WITH OBSTRUCTION/LOWER URINARY TRACT SYMPTOMS: ICD-10-CM

## 2022-07-20 DIAGNOSIS — E34.9 TESTOSTERONE DEFICIENCY: ICD-10-CM

## 2022-07-20 DIAGNOSIS — N52.01 ERECTILE DYSFUNCTION DUE TO ARTERIAL INSUFFICIENCY: ICD-10-CM

## 2022-07-20 DIAGNOSIS — R31.0 GROSS HEMATURIA: Primary | ICD-10-CM

## 2022-07-20 DIAGNOSIS — N40.1 BPH WITH OBSTRUCTION/LOWER URINARY TRACT SYMPTOMS: ICD-10-CM

## 2022-07-20 LAB
PSA SERPL-MCNC: 4.44 NG/ML (ref 0–4)
TESTOST SERPL-MCNC: 328 NG/DL (ref 193–740)

## 2022-07-20 PROCEDURE — 99204 OFFICE O/P NEW MOD 45 MIN: CPT | Performed by: STUDENT IN AN ORGANIZED HEALTH CARE EDUCATION/TRAINING PROGRAM

## 2022-07-20 PROCEDURE — 84153 ASSAY OF PSA TOTAL: CPT

## 2022-07-20 PROCEDURE — 36415 COLL VENOUS BLD VENIPUNCTURE: CPT

## 2022-07-20 PROCEDURE — 84403 ASSAY OF TOTAL TESTOSTERONE: CPT

## 2022-07-20 RX ORDER — TAMSULOSIN HYDROCHLORIDE 0.4 MG/1
1 CAPSULE ORAL DAILY
Qty: 90 CAPSULE | Refills: 3 | Status: SHIPPED | OUTPATIENT
Start: 2022-07-20 | End: 2023-02-22 | Stop reason: SDUPTHER

## 2022-07-20 NOTE — PROGRESS NOTES
"     LUTS Male Office Visit      Patient Name: Justus Bishop  : 1950   MRN: [id number]     Chief Complaint:  Lower Urinary Tract Symptoms.   Chief Complaint   Patient presents with   • LUTS   • Benign Prostatic Hypertrophy        Referring Provider: No ref. provider found    History of Present Illness: Mr. Bishop is a 71 y.o. male with history of presumed BPH with lower urinary tract symptoms, testosterone deficiency, erectile dysfunction, and episodes of gross hematuria x2.  The patient has a former history of remote smoking/tobacco abuse, only 10 years or so when he was younger man.  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  Patient also has a history of anxiety, asthma, depression, hypertension, hearing loss and essential tremor.    Previously following with [facility] for BPH, lower urinary tract symptoms and testosterone deficiency, moved to [place] after divorce last year.     Outside urologic records are limited, per outside records, he was taking combination Daily 5 mg Cialis for ED and daily Tamsulosin 0.4 mg daily.  He states after he moved he was off of these medications at least for the last 6 months or more.  He was recently initiated back on tamsulosin with some improvement in his urinary stream.    He is  and not interested in sexual intercourse at this time.  Not particular interested in treating erectile dysfunction at this time.  His awais score is only 5.  Denies nocturnal or morning erections.    Patient reports a weak flow despite Tamsulosin. Nocturia 2x nightly is bothersome.  Denies significant urgency or frequency throughout the day, denies significant incontinence.  IPSS score is severe at 16.    Reports in , former urologist performed flexible cystoscopy for urinary symptoms, which was \"normal.\"  No records.    Patient has no family history of prostate cancer.  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  He states he was treated with 1 year of Keflex.  States he would prefer to defer scrotal examination today.    He has had a history of gross hematuria x2, most recently in May. Has never undergone CT urogram that I can tell.  He denies any urinary pain at that time, he states he was told his urinary bleeding was probably from his prostate but he is not sure.  Remote former smoker.    Testosterone checked August 2020 was 666.  States he was being treated with testosterone cypionate, unclear dosage every week or so.  He states primary reasons for treating his symptoms were fatigue and low libido as well as concurrent erectile dysfunction.  Has not been on testosterone injections for at least the last year.  States he has \"thick blood\".  CBC 6- demonstrates hematocrit 47.9.  Normal range.  Globin A1c mildly elevated 5.0.  Creatinine 0.96 with a corresponding GFR 85 normal.    PSA checked February 2020 was 2.33.  No PSA checked in the last 2 years.    Patient elects to defer genital examination today or digital rectal examination.    Subjective      Review of System: Review of Systems   Constitutional: Negative for chills, fatigue, fever and unexpected weight change.   HENT: Negative for sore throat.    Eyes: Negative for visual disturbance.   Respiratory: Negative for cough, chest tightness and shortness of breath.    Cardiovascular: Negative for chest pain and leg swelling.   Gastrointestinal: Negative for blood in stool, constipation, diarrhea, nausea, rectal pain and vomiting.   Genitourinary: Negative for decreased urine volume, difficulty urinating, dysuria, enuresis, flank pain, frequency, genital sores, hematuria and urgency.   Musculoskeletal: Negative for back pain and joint swelling.   Skin: Negative for rash and wound.   Neurological: Negative for seizures, speech difficulty, weakness and headaches.   Psychiatric/Behavioral: " Negative for confusion, sleep disturbance and suicidal ideas. The patient is not nervous/anxious.       I have reviewed the ROS documented by my clinical staff, I have updated appropriately and I agree. Rob Escobar MD    Past Medical History:  Past Medical History:   Diagnosis Date   • Anxiety 2019   • Asthma 2016   • Benign prostatic hyperplasia 2017   • Cataract 2006    Repaired   • Depression 2019   • HL (hearing loss) 2000   • Hypertension 2018   • Substance abuse (HCC) 2019    resolved   • Tremor 2019       Past Surgical History:  Past Surgical History:   Procedure Laterality Date   • CARDIAC CATHETERIZATION  2016    clear   • EYE SURGERY  2006    cataract       Medications:    Current Outpatient Medications:   •  albuterol sulfate  (90 Base) MCG/ACT inhaler, Inhale 2 puffs Every 4 (Four) Hours As Needed for Wheezing., Disp: 1 g, Rfl: 0  •  atorvastatin (Lipitor) 20 MG tablet, Take 1 tablet by mouth Daily., Disp: 90 tablet, Rfl: 0  •  cephalexin (Keflex) 500 MG capsule, Take 1 capsule by mouth 4 (Four) Times a Day., Disp: 20 capsule, Rfl: 0  •  Fluticasone Furoate-Vilanterol (Breo Ellipta) 200-25 MCG/INH inhaler, Inhale 1 puff Daily., Disp: 1 each, Rfl: 2  •  hydroCHLOROthiazide (HYDRODIURIL) 12.5 MG tablet, Take 1 tablet by mouth Daily., Disp: 30 tablet, Rfl: 1  •  mupirocin (BACTROBAN) 2 % ointment, Use twice daily, Disp: 1 each, Rfl: 0  •  olmesartan (BENICAR) 40 MG tablet, Take 1 tablet by mouth Daily., Disp: 30 tablet, Rfl: 2  •  tamsulosin (FLOMAX) 0.4 MG capsule 24 hr capsule, Take 1 capsule by mouth Daily., Disp: 90 capsule, Rfl: 3    Allergies:  No Known Allergies    Social History:  Social History     Socioeconomic History   • Marital status:    Tobacco Use   • Smoking status: Former Smoker     Packs/day: 0.50     Years: 5.00     Pack years: 2.50     Types: Cigarettes     Start date: 1970     Quit date: 1975     Years since quittin.5   • Smokeless tobacco: Never  Used   Vaping Use   • Vaping Use: Never used   Substance and Sexual Activity   • Alcohol use: Not Currently   • Sexual activity: Not Currently     Partners: Female       Family History:  Family History   Problem Relation Age of Onset   • Depression Mother    • Kidney disease Mother    • Cancer Father         Multiple Myeloma   • Early death Father    • Heart disease Father        IPSS Questionnaire (AUA-7):  Over the past month…    1)  Incomplete Emptying:       How often have you had a sensation of not emptying you had the sensation of not emptying your bladder completely after you finished urinating?  3 - About half the time   2)  Frequency:       How often have you had the urinate again less than two hours after you finished urinating?  3 - About half the time   3)  Intermittency:       How often have you found you stopped and started again several times when you urinated?   2 - Less than half the time   4) Urgency:      How often have you found it difficult to postpone urination?  1 - Less than 1 time in 5   5) Weak Stream:      How often have you had a weak urinary stream?  3 - About half the time   6) Straining:       How often have you had to push or strain to begin urination?  2 - Less than half the time   7) Nocturia:      How many times did you most typically get up to urinate from the time you went to bed at night until the time you got up in the morning?  2 - 2 times   Total Score:  16   The International Prostate Symptom Score (IPSS) is used to screen, diagnose, track symptoms of benign prostatic hyperplasia (BPH).   0-7 (Mild Symptoms) 8-19 (Moderate) 20-35 (Severe)   Quality of Life (QoL):  If you were to spend the rest of your life with your urinary condition just the way it is now, how would you feel about that? 3-Mixed   Urine Leakage (Incontinence) 0-No Leakage       Sexual Health Inventory for Men (MUSHTAQ)   Over the past 6 months:     1. How do you rate your confidence that you could get and keep  "an erection?  1 - Very low   2. When you had erections with sexual  stimulation, how often were your erections hard enough for penetration (entering your partner)?  1 - Almost never or never   3. During sexual intercourse, how often were you able to maintain your erection after you had penetrated (entered) your partner?  1 - Almost never or never   4. During sexual intercourse, how difficult was it to maintain your erection to completion of intercourse?  1 - Extremely difficult   5. When you attempted sexual intercourse, how often was it satisfactory for you?  1 - Almost never or never    Total Score: 5   The Sexual Health Inventory for Men further classifies ED severity with the following breakpoints:   1-7 (Severe ED) 8-11 (Moderate ED) 12-16 (Mild to Moderate ED) 17-21 (Mild ED)          Objective     Physical Exam:   Vital Signs:   Vitals:    07/20/22 0845   BP: 148/76   Pulse: 107   SpO2: 95%   Weight: 105 kg (232 lb)   Height: 182.9 cm (72.01\")     Body mass index is 31.46 kg/m².     Physical Exam  Constitutional:       Appearance: Normal appearance.   HENT:      Head: Normocephalic and atraumatic.      Nose: Nose normal.   Eyes:      Extraocular Movements: Extraocular movements intact.      Conjunctiva/sclera: Conjunctivae normal.      Pupils: Pupils are equal, round, and reactive to light.   Genitourinary:     Comments: Patient elects to defer scrotal/penile/digital rectal examination today after discussion of risk benefits alternatives.  He would like to defer until his next appointment.  Musculoskeletal:         General: Normal range of motion.      Cervical back: Normal range of motion and neck supple.   Skin:     General: Skin is warm and dry.      Findings: No lesion or rash.   Neurological:      General: No focal deficit present.      Mental Status: He is alert and oriented to person, place, and time. Mental status is at baseline.   Psychiatric:         Mood and Affect: Mood normal.         Behavior: " Behavior normal.         Labs:   No results found for: PSA    Brief Urine Lab Results  (Last result in the past 365 days)      Color   Clarity   Blood   Leuk Est   Nitrite   Protein   CREAT   Urine HCG        06/13/22 1112             163.4                    Lab Results   Component Value Date    GLUCOSE 86 06/13/2022    CALCIUM 9.2 06/13/2022     06/13/2022    K 3.7 06/13/2022    CO2 24.2 06/13/2022     06/13/2022    BUN 11 06/13/2022    CREATININE 0.96 06/13/2022    EGFRIFAFRI 103 01/26/2022    EGFRIFNONA 85 01/26/2022    BCR 11.5 06/13/2022    ANIONGAP 11.8 06/13/2022       Lab Results   Component Value Date    WBC 11.37 (H) 06/21/2022    HGB 17.2 06/21/2022    HCT 47.9 06/21/2022    MCV 84.8 06/21/2022     06/21/2022       Images:   XR Foot 3+ View Right (In Office)    Result Date: 6/13/2022  1. Negative for acute fracture. 2. Chronic findings above.  This report was finalized on 6/13/2022 12:58 PM by Les Dee MD.        Measures:   Tobacco:   Justus Bishop  reports that he quit smoking about 47 years ago. His smoking use included cigarettes. He started smoking about 52 years ago. He has a 2.50 pack-year smoking history. He has never used smokeless tobacco.           Assessment / Plan      Assessment:  Mr. Bishop is a 71 y.o. male who presented today with multiple urologic complaints.  Previously following in [place] with urology there.  [facility] he was treated with testosterone cypionate for testosterone deficiency, daily Cialis for erectile dysfunction, daily tamsulosin and finasteride for BPH with lower urinary tract symptoms.  [facility] he was taken off finasteride as he struggled with erectile dysfunction and it was thought that this was counteracting his erectile medications.  Patient has had 2 episodes of gross hematuria within the last year that have not been worked up.  [facility] he was previously counseled that it may be his prostate causing bleeding.  [facility] he underwent a remote  cystoscopy, no records available.  Discussed any patient that is 71 years old and has a history of smoking should undergo flexible cystoscopy for complete work-up and CT urogram for evaluation.  We discussed the risk benefits alternatives of testosterone supplementation in a 71-year-old male.  He would like to at least have his testosterone checked and discuss treatment options if he is found to have significant testosterone deficiency.  We discussed the controversies of testosterone supplementation in the aging population, with potential increased risk of VTE.  We discussed that testosterone actually declines as one ages.  He states his primary complaint is fatigue and weight gain.  Discussed these are likely multifactorial.    Patient will have PSA screening today, we will check his testosterone.    We discussed his BPH and lower urinary tract symptoms, discussed counseling is limited without digital rectal examination, discussed that full work-up would include flexible cystoscopy which is necessary at this point given his gross hematuria, we can also use a CT urogram to size his prostate.  We discussed that besides anticholinergics there is no other medication besides finasteride I would add to his regimen at this time.    Discussed there are minimally invasive approaches to get men off medications in regards to BPH including UroLift, educational materials were provided regarding my surgical interventions.    Patient states he is interested in surgical intervention potentially depending on findings but would like to defer at this time.    Patient will require digital rectal exam and we will perform this at his flexible cystoscopy appointment for complete work-up of his gross hematuria.  Complex clinical history, former drug abuse.  Patient reports possible MRSA testicular infection which is extremely unusual.    Limited outside records and this is difficult clinical situation, we will essentially be completely  working this patient up for his multiple urologic complaints moving forward.    Diagnoses and all orders for this visit:    1. Gross hematuria (Primary)    - flex cysto and CT urogram given risks    -     CT Abdomen Pelvis With & Without Contrast; Future    2. Testosterone deficiency    - will discuss treatment options pending results    -     Testosterone; Future  -     PSA Diagnostic; Future    3. BPH with obstruction/lower urinary tract symptoms  - use flex cysto to eval prostate obstruction  - use CT urogram for prostate sizing  - cont Flomax daily    -     tamsulosin (FLOMAX) 0.4 MG capsule 24 hr capsule; Take 1 capsule by mouth Daily.  Dispense: 90 capsule; Refill: 3  -     PSA Diagnostic; Future    4. Erectile dysfunction due to arterial insufficiency  -Monitor, patient reports he is not sexually active at this time, he is currently single after divorce, not particularly bothersome to him at this time            Follow Up:   Return in about 13 days (around 8/2/2022), or Flex cysto in clinic, for Flex cysto in clinic.    I spent approximately 45 minutes providing clinical care for this patient; including review of patient's chart and provider documentation, face to face time spent with patient in examination room (obtaining history, performing physical exam, discussing diagnosis and management options), placing orders, and completing patient documentation.     Rob Escobar MD  [facility]

## 2022-07-21 ENCOUNTER — TELEPHONE (OUTPATIENT)
Dept: UROLOGY | Facility: CLINIC | Age: 72
End: 2022-07-21

## 2022-07-21 ENCOUNTER — OFFICE VISIT (OUTPATIENT)
Dept: FAMILY MEDICINE CLINIC | Facility: CLINIC | Age: 72
End: 2022-07-21

## 2022-07-21 ENCOUNTER — LAB (OUTPATIENT)
Dept: LAB | Facility: HOSPITAL | Age: 72
End: 2022-07-21

## 2022-07-21 VITALS
HEART RATE: 94 BPM | SYSTOLIC BLOOD PRESSURE: 158 MMHG | RESPIRATION RATE: 20 BRPM | TEMPERATURE: 98.6 F | BODY MASS INDEX: 33.25 KG/M2 | OXYGEN SATURATION: 94 % | WEIGHT: 245.25 LBS | DIASTOLIC BLOOD PRESSURE: 88 MMHG

## 2022-07-21 DIAGNOSIS — E78.49 OTHER HYPERLIPIDEMIA: ICD-10-CM

## 2022-07-21 DIAGNOSIS — I10 ESSENTIAL HYPERTENSION: ICD-10-CM

## 2022-07-21 DIAGNOSIS — I10 ESSENTIAL HYPERTENSION: Primary | ICD-10-CM

## 2022-07-21 DIAGNOSIS — M25.50 ARTHRALGIA, UNSPECIFIED JOINT: ICD-10-CM

## 2022-07-21 DIAGNOSIS — M19.90 ARTHRITIS: Primary | ICD-10-CM

## 2022-07-21 DIAGNOSIS — D72.829 LEUKOCYTOSIS, UNSPECIFIED TYPE: ICD-10-CM

## 2022-07-21 DIAGNOSIS — M19.90 ARTHRITIS: ICD-10-CM

## 2022-07-21 LAB
ALBUMIN SERPL-MCNC: 4 G/DL (ref 3.5–5.2)
ALBUMIN/GLOB SERPL: 1.1 G/DL
ALP SERPL-CCNC: 83 U/L (ref 39–117)
ALT SERPL W P-5'-P-CCNC: 21 U/L (ref 1–41)
ANION GAP SERPL CALCULATED.3IONS-SCNC: 8 MMOL/L (ref 5–15)
AST SERPL-CCNC: 20 U/L (ref 1–40)
BILIRUB SERPL-MCNC: 0.4 MG/DL (ref 0–1.2)
BUN SERPL-MCNC: 13 MG/DL (ref 8–23)
BUN/CREAT SERPL: 12.4 (ref 7–25)
CALCIUM SPEC-SCNC: 10.2 MG/DL (ref 8.6–10.5)
CHLORIDE SERPL-SCNC: 104 MMOL/L (ref 98–107)
CO2 SERPL-SCNC: 29 MMOL/L (ref 22–29)
CREAT SERPL-MCNC: 1.05 MG/DL (ref 0.76–1.27)
EGFRCR SERPLBLD CKD-EPI 2021: 75.9 ML/MIN/1.73
ERYTHROCYTE [SEDIMENTATION RATE] IN BLOOD: 14 MM/HR (ref 0–20)
GLOBULIN UR ELPH-MCNC: 3.5 GM/DL
GLUCOSE SERPL-MCNC: 90 MG/DL (ref 65–99)
POTASSIUM SERPL-SCNC: 4.6 MMOL/L (ref 3.5–5.2)
PROT SERPL-MCNC: 7.5 G/DL (ref 6–8.5)
SODIUM SERPL-SCNC: 141 MMOL/L (ref 136–145)

## 2022-07-21 PROCEDURE — 36415 COLL VENOUS BLD VENIPUNCTURE: CPT

## 2022-07-21 PROCEDURE — 86038 ANTINUCLEAR ANTIBODIES: CPT

## 2022-07-21 PROCEDURE — 86431 RHEUMATOID FACTOR QUANT: CPT

## 2022-07-21 PROCEDURE — 86140 C-REACTIVE PROTEIN: CPT

## 2022-07-21 PROCEDURE — 80053 COMPREHEN METABOLIC PANEL: CPT

## 2022-07-21 PROCEDURE — 99214 OFFICE O/P EST MOD 30 MIN: CPT | Performed by: STUDENT IN AN ORGANIZED HEALTH CARE EDUCATION/TRAINING PROGRAM

## 2022-07-21 PROCEDURE — 85652 RBC SED RATE AUTOMATED: CPT

## 2022-07-21 PROCEDURE — 83615 LACTATE (LD) (LDH) ENZYME: CPT

## 2022-07-21 RX ORDER — HYDROCHLOROTHIAZIDE 25 MG/1
25 TABLET ORAL DAILY
Qty: 30 TABLET | Refills: 2 | Status: SHIPPED | OUTPATIENT
Start: 2022-07-21 | End: 2022-10-22

## 2022-07-21 NOTE — PROGRESS NOTES
Chief Complaint  Hypertension (1 month fu)    History of Present Illness     htn  Not at goal.         The following portions of the patient's history were reviewed and updated as appropriate: allergies, current medications, past family history, past medical history, past social history, past surgical history, and problem list.    OBJECTIVE:  /88   Pulse 94   Temp 98.6 °F (37 °C) (Temporal)   Resp 20   Wt 111 kg (245 lb 4 oz)   SpO2 94%   BMI 33.25 kg/m²       Physical Exam  Constitutional:       General: He is not in acute distress.     Appearance: Normal appearance.   HENT:      Head: Normocephalic and atraumatic.   Eyes:      Extraocular Movements: Extraocular movements intact.   Cardiovascular:      Rate and Rhythm: Normal rate and regular rhythm.      Heart sounds: No murmur heard.  Pulmonary:      Effort: Pulmonary effort is normal. No respiratory distress.      Breath sounds: Normal breath sounds.   Abdominal:      General: Abdomen is flat.   Musculoskeletal:         General: Swelling (trace edema present bilateral lower extremities) present.      Cervical back: Normal range of motion.   Skin:     Findings: No rash.   Neurological:      General: No focal deficit present.      Mental Status: He is alert.   Psychiatric:         Mood and Affect: Mood normal.                    Assessment and Plan   Diagnoses and all orders for this visit:    1. Essential hypertension (Primary)  -     Comprehensive metabolic panel; Future    2. Other hyperlipidemia    Other orders  -     hydroCHLOROthiazide (HYDRODIURIL) 25 MG tablet; Take 1 tablet by mouth Daily.  Dispense: 30 tablet; Refill: 2    Increase hctz to 25 mg daily.     Continue statin. Check lfts    Reviewed urology note. He will be going for cystoscopy. Reviewed psa and testosterone.     Encouraged patient to follow up with cardiology, nephrology and hematology. He has follow up with hematology tomorrow. Follow up with cards is in August.       Return in  about 1 month (around 8/21/2022).       Misty Mai D.O.  [facility] [place]

## 2022-07-21 NOTE — TELEPHONE ENCOUNTER
Called patient back with lab results, PSA came back at 4.4.  Not overly concerning and I suspect the patient's PSA is largely driven by enlarged prostate, we will assess for PSA density based on prostatic measurements on CT urogram scheduled in August.  Do not feel strongly about rushing him off for a prostate biopsy and patient is on the same page.  Otherwise he will see me back in August for flexible cystoscopy for gross hematuria work-up as well.  His testosterone levels came back normal at 328, stable numbers for a man of his age.  Do not recommend testosterone supplementation at this time.  Patient also reports understanding.    Rob Escobar MD

## 2022-07-22 ENCOUNTER — LAB (OUTPATIENT)
Dept: LAB | Facility: HOSPITAL | Age: 72
End: 2022-07-22

## 2022-07-22 ENCOUNTER — CONSULT (OUTPATIENT)
Dept: ONCOLOGY | Facility: CLINIC | Age: 72
End: 2022-07-22

## 2022-07-22 VITALS
WEIGHT: 246 LBS | HEART RATE: 100 BPM | OXYGEN SATURATION: 98 % | RESPIRATION RATE: 24 BRPM | BODY MASS INDEX: 35.22 KG/M2 | SYSTOLIC BLOOD PRESSURE: 144 MMHG | DIASTOLIC BLOOD PRESSURE: 77 MMHG | HEIGHT: 70 IN | TEMPERATURE: 97.3 F

## 2022-07-22 DIAGNOSIS — D72.829 LEUKOCYTOSIS, UNSPECIFIED TYPE: Primary | ICD-10-CM

## 2022-07-22 DIAGNOSIS — D72.829 LEUKOCYTOSIS, UNSPECIFIED TYPE: ICD-10-CM

## 2022-07-22 LAB
ANA SER QL: NEGATIVE
CHROMATIN AB SERPL-ACNC: <10 IU/ML (ref 0–14)
CRP SERPL-MCNC: <0.3 MG/DL (ref 0–0.5)
ERYTHROCYTE [DISTWIDTH] IN BLOOD BY AUTOMATED COUNT: 15.3 % (ref 12.3–15.4)
HCT VFR BLD AUTO: 52.3 % (ref 37.5–51)
HGB BLD-MCNC: 16.3 G/DL (ref 13–17.7)
LDH SERPL-CCNC: 316 U/L (ref 135–225)
LYMPHOCYTES # BLD AUTO: 3.5 10*3/MM3 (ref 0.7–3.1)
LYMPHOCYTES NFR BLD AUTO: 20.5 % (ref 19.6–45.3)
MCH RBC QN AUTO: 27.7 PG (ref 26.6–33)
MCHC RBC AUTO-ENTMCNC: 31.1 G/DL (ref 31.5–35.7)
MCV RBC AUTO: 89.2 FL (ref 79–97)
MONOCYTES # BLD AUTO: 0.9 10*3/MM3 (ref 0.1–0.9)
MONOCYTES NFR BLD AUTO: 5.3 % (ref 5–12)
NEUTROPHILS NFR BLD AUTO: 12.8 10*3/MM3 (ref 1.7–7)
NEUTROPHILS NFR BLD AUTO: 74.2 % (ref 42.7–76)
PLATELET # BLD AUTO: 376 10*3/MM3 (ref 140–450)
PMV BLD AUTO: 6.5 FL (ref 6–12)
RBC # BLD AUTO: 5.86 10*6/MM3 (ref 4.14–5.8)
WBC NRBC COR # BLD: 17.2 10*3/MM3 (ref 3.4–10.8)

## 2022-07-22 PROCEDURE — 88184 FLOWCYTOMETRY/ TC 1 MARKER: CPT

## 2022-07-22 PROCEDURE — 88185 FLOWCYTOMETRY/TC ADD-ON: CPT

## 2022-07-22 PROCEDURE — 36415 COLL VENOUS BLD VENIPUNCTURE: CPT

## 2022-07-22 PROCEDURE — 85025 COMPLETE CBC W/AUTO DIFF WBC: CPT

## 2022-07-22 PROCEDURE — 85060 BLOOD SMEAR INTERPRETATION: CPT

## 2022-07-22 PROCEDURE — 99204 OFFICE O/P NEW MOD 45 MIN: CPT | Performed by: INTERNAL MEDICINE

## 2022-07-22 NOTE — PROGRESS NOTES
New Patient Office Visit      Date: 2022     Patient Name: Justus Bishop  MRN: [id number]  : 1950  Referring Physician: Misty Mai    Chief Complaint: Establish care for leukocytosis    History of Present Illness: Justus Bishop is a pleasant 71 y.o. male with past medical history of hyperlipidemia, hypertension, seasonal allergies, BPH, staph related skin infection who presents today for evaluation of leukocytosis. The patient recently moved to [place] to be closer to his child and has established with his PCP who is been monitoring CBCs which is been notable for mild leukocytosis over the past 6 months.  His CBC has ranged tween 11.3-12.9 during this timeframe.  He has a mild increase in his ALC but his ANC has been normal.  He denies any unexplained fevers, chills, night sweats.  Notes continued irritation on his face from his known staph skin infection.  Has previously been on multiple rounds of antibiotics for this.  He denies any smoking or recent steroid use.  He does note a family history of multiple myeloma in his father.  Denies any new or worsening bone/joint pains    Oncology History:    Oncology/Hematology History    No history exists.       Subjective      Review of Systems:     Constitutional: Negative for fevers, chills, or weight loss  Eyes: Negative for blurred vision or discharge         Ear/Nose/Throat: Negative for difficulty swallowing, sore throat, LAD                                                       Respiratory: Negative for cough, SOA, wheezing                                                                                        Cardiovascular: Negative for chest pain or palpitations                                                                  Gastrointestinal: Negative for nausea, vomiting or diarrhea                                                                     Genitourinary: Negative for dysuria or hematuria                                                                                            Musculoskeletal: Negative for any joint pains or muscle aches                                                                        Neurologic: Negative for any weakness, headaches, dizziness                                                                         Hematologic: Negative for any easy bleeding or bruising                                                                                   Psychiatric: Negative for anxiety or depression                             Past Medical History:   Past Medical History:   Diagnosis Date   • Anxiety 2019   • Asthma 2016   • Benign prostatic hyperplasia 2017   • Cataract 2006    Repaired   • Depression 2019   • HL (hearing loss)    • Hypertension    • Substance abuse (HCC) 2019    resolved   • Tremor        Past Surgical History:   Past Surgical History:   Procedure Laterality Date   • CARDIAC CATHETERIZATION  2016    clear   • EYE SURGERY  2006    cataract       Family History:   Family History   Problem Relation Age of Onset   • Depression Mother    • Kidney disease Mother    • Cancer Father         Multiple Myeloma   • Early death Father    • Heart disease Father        Social History:   Social History     Socioeconomic History   • Marital status:    Tobacco Use   • Smoking status: Former Smoker     Packs/day: 0.50     Years: 12.00     Pack years: 6.00     Types: Cigarettes     Start date: 1970     Quit date:      Years since quittin.5   • Smokeless tobacco: Never Used   Vaping Use   • Vaping Use: Never used   Substance and Sexual Activity   • Alcohol use: Yes     Comment: 0-3x/daily; <6x/week   • Drug use: Yes     Types: Marijuana     Comment: 0-3x/daily; 15x/monthly for 3 yrs prior to quitting in    • Sexual activity: Not Currently     Partners: Female       Medications:     Current Outpatient Medications:   •  albuterol sulfate  (90 Base) MCG/ACT inhaler,  "Inhale 2 puffs Every 4 (Four) Hours As Needed for Wheezing., Disp: 1 g, Rfl: 0  •  atorvastatin (Lipitor) 20 MG tablet, Take 1 tablet by mouth Daily., Disp: 90 tablet, Rfl: 0  •  Fluticasone Furoate-Vilanterol (Breo Ellipta) 200-25 MCG/INH inhaler, Inhale 1 puff Daily., Disp: 1 each, Rfl: 2  •  hydroCHLOROthiazide (HYDRODIURIL) 25 MG tablet, Take 1 tablet by mouth Daily., Disp: 30 tablet, Rfl: 2  •  mupirocin (BACTROBAN) 2 % ointment, Use twice daily, Disp: 1 each, Rfl: 0  •  olmesartan (BENICAR) 40 MG tablet, Take 1 tablet by mouth Daily., Disp: 30 tablet, Rfl: 2  •  tamsulosin (FLOMAX) 0.4 MG capsule 24 hr capsule, Take 1 capsule by mouth Daily., Disp: 90 capsule, Rfl: 3    Allergies:   No Known Allergies    Objective     Physical Exam:  Vital Signs:   Vitals:    07/22/22 1125   BP: 144/77  Comment: LUE   Pulse: 100   Resp: 24   Temp: 97.3 °F (36.3 °C)   TempSrc: Infrared   SpO2: 98%  Comment: RA   Weight: 112 kg (246 lb)   Height: 177.8 cm (70\")   PainSc: 0-No pain     Pain Score    07/22/22 1125   PainSc: 0-No pain     ECOG Performance Status: 0 - Asymptomatic    Constitutional: NAD, ECOG 0  Eyes: PERRLA, scleral anicteric  ENT: No LAD, no thyromegaly  Respiratory: CTAB, no wheezing, rales, rhonchi  Cardiovascular: RRR, no murmurs, pulses 2+ bilaterally  Abdomen: soft, NT/ND, no HSM  Musculoskeletal: strength 5/5 bilaterally, no c/c/e  Neurologic: A&O x 3, CN II-XII intact grossly  Psych: mood and affect congruent, no SI or HI    Results Review:   Lab on 07/21/2022   Component Date Value Ref Range Status   • Rheumatoid Factor Quantitative 07/21/2022 <10.0  0.0 - 14.0 IU/mL Final   • Sed Rate 07/21/2022 14  0 - 20 mm/hr Final   • C-Reactive Protein 07/21/2022 <0.30  0.00 - 0.50 mg/dL Final   • Glucose 07/21/2022 90  65 - 99 mg/dL Final   • BUN 07/21/2022 13  8 - 23 mg/dL Final   • Creatinine 07/21/2022 1.05  0.76 - 1.27 mg/dL Final   • Sodium 07/21/2022 141  136 - 145 mmol/L Final   • Potassium 07/21/2022 4.6  " 3.5 - 5.2 mmol/L Final    Slight hemolysis detected by analyzer. Results may be affected.   • Chloride 07/21/2022 104  98 - 107 mmol/L Final   • CO2 07/21/2022 29.0  22.0 - 29.0 mmol/L Final   • Calcium 07/21/2022 10.2  8.6 - 10.5 mg/dL Final   • Total Protein 07/21/2022 7.5  6.0 - 8.5 g/dL Final   • Albumin 07/21/2022 4.00  3.50 - 5.20 g/dL Final   • ALT (SGPT) 07/21/2022 21  1 - 41 U/L Final   • AST (SGOT) 07/21/2022 20  1 - 40 U/L Final   • Alkaline Phosphatase 07/21/2022 83  39 - 117 U/L Final   • Total Bilirubin 07/21/2022 0.4  0.0 - 1.2 mg/dL Final   • Globulin 07/21/2022 3.5  gm/dL Final    Calculated Result   • A/G Ratio 07/21/2022 1.1  g/dL Final   • BUN/Creatinine Ratio 07/21/2022 12.4  7.0 - 25.0 Final   • Anion Gap 07/21/2022 8.0  5.0 - 15.0 mmol/L Final   • eGFR 07/21/2022 75.9  >60.0 mL/min/1.73 Final    National Kidney Foundation and American Society of Nephrology (ASN) Task Force recommended calculation based on the Chronic Kidney Disease Epidemiology Collaboration (CKD-EPI) equation refit without adjustment for race.   Lab on 07/20/2022   Component Date Value Ref Range Status   • Testosterone, Total 07/20/2022 328.00  193.00 - 740.00 ng/dL Final   • PSA 07/20/2022 4.440 (A) 0.000 - 4.000 ng/mL Final       Adult Transthoracic Echo Complete W/ Cont if Necessary Per Protocol    Result Date: 6/27/2022  Narrative: · Left ventricular ejection fraction appears to be 56 - 60%. Left ventricular systolic function is normal. · Left ventricular diastolic function is consistent with (grade I) impaired relaxation. · Estimated right ventricular systolic pressure from tricuspid regurgitation is normal (<35 mmHg). Calculated right ventricular systolic pressure from tricuspid regurgitation is 23 mmHg.      Duplex Venous Lower Extremity - Bilateral CAR    Result Date: 6/27/2022  Narrative: · Normal bilateral lower extremity venous duplex scan.        Assessment / Plan      Assessment/Plan:   1. Leukocytosis,  unspecified type (Primary)  -Unclear etiology although likely related to chronic inflammation.    -WBC range between 11.3-12.9 over the past 6 months  -Differential with mild elevation in lymphocytes  -Previously checked ESR and rheumatoid factor within normal limits  -We will check labs as below to rule out a secondary causes  -No indication for bone marrow biopsy at this time  -     CBC & Differential; Future  -     Lactate Dehydrogenase; Future  -     Peripheral Blood Smear; Future  -     Flow Cytometry, Blood; Future           Follow Up:   Follow-up in 2-3 weeks     Hector Silva MD  Hematology and Oncology     Please note that portions of this note may have been completed with a voice recognition program. Efforts were made to edit the dictations, but occasionally words are mistranscribed.

## 2022-07-23 LAB
CYTOLOGIST CVX/VAG CYTO: NORMAL
PATH INTERP BLD-IMP: NORMAL

## 2022-07-27 LAB — REF LAB TEST METHOD: NORMAL

## 2022-07-28 LAB — HLA-B27 QL NAA+PROBE: NEGATIVE

## 2022-08-02 ENCOUNTER — PROCEDURE VISIT (OUTPATIENT)
Dept: UROLOGY | Facility: CLINIC | Age: 72
End: 2022-08-02

## 2022-08-02 VITALS — HEIGHT: 70 IN | BODY MASS INDEX: 35.22 KG/M2 | WEIGHT: 246 LBS

## 2022-08-02 DIAGNOSIS — R31.0 GROSS HEMATURIA: ICD-10-CM

## 2022-08-02 DIAGNOSIS — N13.8 BPH WITH OBSTRUCTION/LOWER URINARY TRACT SYMPTOMS: ICD-10-CM

## 2022-08-02 DIAGNOSIS — N40.1 BPH WITH OBSTRUCTION/LOWER URINARY TRACT SYMPTOMS: ICD-10-CM

## 2022-08-02 LAB
BILIRUB BLD-MCNC: NEGATIVE MG/DL
CLARITY, POC: CLEAR
COLOR UR: YELLOW
EXPIRATION DATE: ABNORMAL
GLUCOSE UR STRIP-MCNC: NEGATIVE MG/DL
KETONES UR QL: NEGATIVE
LEUKOCYTE EST, POC: ABNORMAL
Lab: ABNORMAL
NITRITE UR-MCNC: NEGATIVE MG/ML
PH UR: 6 [PH] (ref 5–8)
PROT UR STRIP-MCNC: NEGATIVE MG/DL
RBC # UR STRIP: NEGATIVE /UL
SP GR UR: 1.02 (ref 1–1.03)
UROBILINOGEN UR QL: NORMAL

## 2022-08-02 PROCEDURE — 52000 CYSTOURETHROSCOPY: CPT | Performed by: STUDENT IN AN ORGANIZED HEALTH CARE EDUCATION/TRAINING PROGRAM

## 2022-08-02 PROCEDURE — 81003 URINALYSIS AUTO W/O SCOPE: CPT | Performed by: STUDENT IN AN ORGANIZED HEALTH CARE EDUCATION/TRAINING PROGRAM

## 2022-08-02 NOTE — PROGRESS NOTES
Preprocedure diagnosis  Gross hematuria  BPH with LUTS    Postprocedure diagnosis  Gross hematuria  BPH with LUTS    Procedure  Flexible Cystourethroscopy    Attending surgeon  Rob Escobar MD    Anesthesia  2% lidocaine jelly intraurethrally    Complications  None    Indications  71 y.o. male undergoing a flexible cystoscopy for the above mentioned indications.  Informed consent was obtained.      Patient has a history of gross hematuria, CT urogram is scheduled 8-.  He also has BPH with lower urinary tract symptoms currently managing with monotherapy tamsulosin 0.4 mg daily.  He presents today for evaluation of prostatic obstruction as well to rule out bladder abnormalities as cause gross hematuria.  I will call him with CT urogram results when available.      Findings  The urethral urothelium was within normal limits with no visible strictures.      The prostatic urethra revealed significantly obstructing lateral lobe coaptation, with significant intravesical median lobe.     Bladder findings included bilateral ureters in orthotopic position, normal bladder mucosa without tumors, lesions, or stones.     Procedure  The patient was placed in supine position and prepped and draped in sterile fashion with lidocaine jelly instill per the urethra for anesthesia 5 minutes prior to procedure start.  A brief timeout was performed including available nursing staff and the patient.      The 16 Fr digital flexible cystoscope was lubricated and gently advanced into the urethral meatus.     The penile and bulbar urethra appeared widely patent without visible lesion or stricture.     The prostatic urethra was notable for significant lateral lobe coaptation, with significant intravesical median lobe component.      The scope was then advanced into the bladder. The bladder was completely visualized starting with the trigone.     There were bilateral orthotopic ureteral orifices.     The posterior wall, lateral  walls, anterior wall, and dome were completely visualized WITHOUT obvious mucosal lesions, tumors, stones. He does have numerous posterior bladder wall trabeculations indicative of chronic bladder outlet obstruction.       The cystoscope was retroflexed and the bladder neck and prostate were further visualized, there was significant intravesical median lobe component pushing up from the prostatic fossa into the bladder.    The cystoscope was then gently withdrawn while visualizing the urethra and the procedure was then terminated.  The patient tolerated the procedure well.      Follow Up:     CT Urogram scheduled for 8/14/222, will call with results to complete hematuria workup.     Patient has a significantly obstructing prostate with evidence of chronic bladder outlet obstruction, he does have a significant intravesical median lobe component.  I discussed with the patient that his best surgical option will depend on his prostate volume which we can evaluate after he completes his CT scan.  There is no obvious bladder abnormality to explain his gross hematuria.  I discussed with the patient we will go ahead and call him and discuss surgical options a bit further but he is interested in a greenlight photo vaporization of prostate if his prostate size is reasonable.  If he does have a severely enlarged prostate, we may have to explore laser enucleation of the prostate versus robotic simple prostatectomy.    Rob Escobar MD

## 2022-08-08 ENCOUNTER — OFFICE VISIT (OUTPATIENT)
Dept: FAMILY MEDICINE CLINIC | Facility: CLINIC | Age: 72
End: 2022-08-08

## 2022-08-08 VITALS
HEIGHT: 70 IN | TEMPERATURE: 98.1 F | RESPIRATION RATE: 18 BRPM | BODY MASS INDEX: 35.22 KG/M2 | OXYGEN SATURATION: 98 % | HEART RATE: 78 BPM | WEIGHT: 246 LBS | DIASTOLIC BLOOD PRESSURE: 76 MMHG | SYSTOLIC BLOOD PRESSURE: 148 MMHG

## 2022-08-08 DIAGNOSIS — Z00.00 WELLNESS EXAMINATION: Primary | ICD-10-CM

## 2022-08-08 PROCEDURE — 1159F MED LIST DOCD IN RCRD: CPT | Performed by: STUDENT IN AN ORGANIZED HEALTH CARE EDUCATION/TRAINING PROGRAM

## 2022-08-08 PROCEDURE — 1170F FXNL STATUS ASSESSED: CPT | Performed by: STUDENT IN AN ORGANIZED HEALTH CARE EDUCATION/TRAINING PROGRAM

## 2022-08-08 PROCEDURE — 90677 PCV20 VACCINE IM: CPT | Performed by: STUDENT IN AN ORGANIZED HEALTH CARE EDUCATION/TRAINING PROGRAM

## 2022-08-08 PROCEDURE — G0009 ADMIN PNEUMOCOCCAL VACCINE: HCPCS | Performed by: STUDENT IN AN ORGANIZED HEALTH CARE EDUCATION/TRAINING PROGRAM

## 2022-08-08 PROCEDURE — G0439 PPPS, SUBSEQ VISIT: HCPCS | Performed by: STUDENT IN AN ORGANIZED HEALTH CARE EDUCATION/TRAINING PROGRAM

## 2022-08-08 NOTE — PROGRESS NOTES
The ABCs of the Annual Wellness Visit  Subsequent Medicare Wellness Visit    Chief Complaint   Patient presents with   • Medicare Wellness-subsequent      Subjective    History of Present Illness:  Justus Bishop is a 71 y.o. male who presents for a Subsequent Medicare Wellness Visit.    The following portions of the patient's history were reviewed and   updated as appropriate: allergies, current medications, past family history, past medical history, past social history, past surgical history and problem list.    Compared to one year ago, the patient feels his physical   health is the same.    Compared to one year ago, the patient feels his mental   health is the same.    Recent Hospitalizations:  He was not admitted to the hospital during the last year.       Current Medical Providers:  Patient Care Team:  Misty Mai DO as PCP - General (Family Medicine)  Hector Silva MD as Consulting Physician (Hematology and Oncology)    Outpatient Medications Prior to Visit   Medication Sig Dispense Refill   • albuterol sulfate  (90 Base) MCG/ACT inhaler Inhale 2 puffs Every 4 (Four) Hours As Needed for Wheezing. 1 g 0   • atorvastatin (Lipitor) 20 MG tablet Take 1 tablet by mouth Daily. 90 tablet 0   • Fluticasone Furoate-Vilanterol (Breo Ellipta) 200-25 MCG/INH inhaler Inhale 1 puff Daily. 1 each 2   • hydroCHLOROthiazide (HYDRODIURIL) 25 MG tablet Take 1 tablet by mouth Daily. 30 tablet 2   • mupirocin (BACTROBAN) 2 % ointment Use twice daily 1 each 0   • olmesartan (BENICAR) 40 MG tablet Take 1 tablet by mouth Daily. 30 tablet 2   • tamsulosin (FLOMAX) 0.4 MG capsule 24 hr capsule Take 1 capsule by mouth Daily. 90 capsule 3     No facility-administered medications prior to visit.       No opioid medication identified on active medication list. I have reviewed chart for other potential  high risk medication/s and harmful drug interactions in the elderly.          Aspirin is not on active medication  "list.  Aspirin use is not indicated based on review of current medical condition/s. Risk of harm outweighs potential benefits.  .    Patient Active Problem List   Diagnosis   • Essential hypertension   • Other hyperlipidemia   • Leukocytosis     Advance Care Planning  Advance Directive is not on file.  ACP discussion was held with the patient during this visit. Patient does not have an advance directive, information provided.          Objective    Vitals:    22 1028   BP: 148/76   Pulse: 78   Resp: 18   Temp: 98.1 °F (36.7 °C)   SpO2: 98%   Weight: 112 kg (246 lb)   Height: 177.8 cm (70\")     Estimated body mass index is 35.3 kg/m² as calculated from the following:    Height as of this encounter: 177.8 cm (70\").    Weight as of this encounter: 112 kg (246 lb).        Does the patient have evidence of cognitive impairment? No    Physical Exam  Constitutional:       General: He is not in acute distress.     Appearance: Normal appearance.   HENT:      Head: Normocephalic and atraumatic.   Eyes:      Extraocular Movements: Extraocular movements intact.   Cardiovascular:      Rate and Rhythm: Normal rate and regular rhythm.      Heart sounds: No murmur heard.  Pulmonary:      Effort: Pulmonary effort is normal. No respiratory distress.      Breath sounds: Normal breath sounds.   Musculoskeletal:         General: No swelling.      Cervical back: Normal range of motion.   Skin:     Findings: No rash.   Neurological:      General: No focal deficit present.      Mental Status: He is alert.   Psychiatric:         Mood and Affect: Mood normal.       Lab Results   Component Value Date    HGBA1C 5.80 (H) 2022            HEALTH RISK ASSESSMENT    Smoking Status:  Social History     Tobacco Use   Smoking Status Former Smoker   • Packs/day: 0.50   • Years: 12.00   • Pack years: 6.00   • Types: Cigarettes   • Start date: 1970   • Quit date:    • Years since quittin.6   Smokeless Tobacco Never Used "     Alcohol Consumption:  Social History     Substance and Sexual Activity   Alcohol Use Yes    Comment: 0-3x/daily; <6x/week     Fall Risk Screen:    MERTADI Fall Risk Assessment was completed, and patient is at LOW risk for falls.Assessment completed on:8/8/2022    Depression Screening:  PHQ-2/PHQ-9 Depression Screening 8/8/2022   Little Interest or Pleasure in Doing Things 0-->not at all   Feeling Down, Depressed or Hopeless 0-->not at all   PHQ-9: Brief Depression Severity Measure Score 0       Health Habits and Functional and Cognitive Screening:  Functional & Cognitive Status 8/8/2022   Do you have difficulty preparing food and eating? No   Do you have difficulty bathing yourself, getting dressed or grooming yourself? No   Do you have difficulty using the toilet? No   Do you have difficulty moving around from place to place? No   Do you have trouble with steps or getting out of a bed or a chair? No   Current Diet Well Balanced Diet   Dental Exam Not up to date   Eye Exam Not up to date   Exercise (times per week) 0 times per week   Current Exercises Include No Regular Exercise   Do you need help using the phone?  No   Are you deaf or do you have serious difficulty hearing?  No   Do you need help with transportation? No   Do you need help shopping? No   Do you need help preparing meals?  No   Do you need help with housework?  No   Do you need help with laundry? No   Do you need help taking your medications? No   Do you need help managing money? No   Do you ever drive or ride in a car without wearing a seat belt? No   Have you felt unusual stress, anger or loneliness in the last month? No   Who do you live with? Alone   If you need help, do you have trouble finding someone available to you? No   Have you been bothered in the last four weeks by sexual problems? No   Do you have difficulty concentrating, remembering or making decisions? No       Age-appropriate Screening Schedule:  Refer to the list below for  future screening recommendations based on patient's age, sex and/or medical conditions. Orders for these recommended tests are listed in the plan section. The patient has been provided with a written plan.    Health Maintenance   Topic Date Due   • TDAP/TD VACCINES (1 - Tdap) Never done   • ZOSTER VACCINE (1 of 2) Never done   • INFLUENZA VACCINE  10/01/2022   • LIPID PANEL  01/26/2023              Assessment & Plan   CMS Preventative Services Quick Reference  Risk Factors Identified During Encounter  Cardiovascular Disease  The above risks/problems have been discussed with the patient.  Follow up actions/plans if indicated are seen below in the Assessment/Plan Section.  Pertinent information has been shared with the patient in the After Visit Summary.    There are no diagnoses linked to this encounter.    Follow Up:   Return in about 3 months (around 11/8/2022).     An After Visit Summary and PPPS were made available to the patient.      Annual exam  Colonoscopy: recommended. He declines  Recommend dental and eye exam  psa by urology   hiv std screening: he declines  a1c /lipid screening: up to date  covid vaccine: he has had   Shingles: recommend   Pneumonia: he says he has not had.  He is agreeable  Tdap: recommended     htn  He has not taken his meds today    BPH  Reviewed urology cystoscopy note. He has ct scan planned. He is considering surgery for prostate.     Shoulder pain  Right side. He says this has been going on for over a year. He says he does not want to discuss this at this time as he wants to work on his bph at this time.    He says he has scheduled cardiology follow up.     Reviewed oncology note.

## 2022-08-12 ENCOUNTER — OFFICE VISIT (OUTPATIENT)
Dept: ONCOLOGY | Facility: CLINIC | Age: 72
End: 2022-08-12

## 2022-08-12 VITALS
HEART RATE: 95 BPM | OXYGEN SATURATION: 96 % | WEIGHT: 242.9 LBS | BODY MASS INDEX: 34.85 KG/M2 | RESPIRATION RATE: 20 BRPM | TEMPERATURE: 97 F | SYSTOLIC BLOOD PRESSURE: 156 MMHG | DIASTOLIC BLOOD PRESSURE: 77 MMHG

## 2022-08-12 DIAGNOSIS — D72.829 LEUKOCYTOSIS, UNSPECIFIED TYPE: Primary | ICD-10-CM

## 2022-08-12 PROCEDURE — 99214 OFFICE O/P EST MOD 30 MIN: CPT | Performed by: INTERNAL MEDICINE

## 2022-08-12 NOTE — PROGRESS NOTES
Follow Up Office Visit      Date: 2022     Patient Name: Justus Bishop  MRN: [id number]  : 1950  Referring Physician: Misty Mai     Chief Complaint:  Follow-up for leukocytosis     History of Present Illness: Justus Bishop is a pleasant 71 y.o. male with past medical history of hyperlipidemia, hypertension, seasonal allergies, BPH, staph related skin infection who presents today for evaluation of leukocytosis. The patient recently moved to [place] to be closer to his child and has established with his PCP who is been monitoring CBCs which is been notable for mild leukocytosis over the past 6 months.  His CBC has ranged tween 11.3-12.9 during this timeframe.  He has a mild increase in his ALC but his ANC has been normal.  He denies any unexplained fevers, chills, night sweats.  Notes continued irritation on his face from his known staph skin infection.  Has previously been on multiple rounds of antibiotics for this.  He denies any smoking or recent steroid use.  He does note a family history of multiple myeloma in his father.  Denies any new or worsening bone/joint pains    Interval History:  Presents clinic for follow-up.  Overall doing well.  Denies any worsening bone or joint pains.  Denies any unexplained fevers or chills.  Follow with urology for enlarged prostate with plans for probable procedure in the near future    Oncology History:    Oncology/Hematology History    No history exists.       Subjective      Review of Systems:   Constitutional: Negative for fevers, chills, or weight loss  Eyes: Negative for blurred vision or discharge         Ear/Nose/Throat: Negative for difficulty swallowing, sore throat, LAD                                                       Respiratory: Negative for cough, SOA, wheezing                                                                                        Cardiovascular: Negative for chest pain or palpitations                                                                   Gastrointestinal: Negative for nausea, vomiting or diarrhea                                                                     Genitourinary: Negative for dysuria or hematuria                                                                                           Musculoskeletal: Negative for any joint pains or muscle aches                                                                        Neurologic: Negative for any weakness, headaches, dizziness                                                                         Hematologic: Negative for any easy bleeding or bruising                                                                                   Psychiatric: Negative for anxiety or depression                          Past Medical History/Past Surgical History/ Family History/ Social History: Reviewed by me and unchanged from my previous documentation done on July 2022.     Medications:     Current Outpatient Medications:   •  albuterol sulfate  (90 Base) MCG/ACT inhaler, Inhale 2 puffs Every 4 (Four) Hours As Needed for Wheezing., Disp: 1 g, Rfl: 0  •  atorvastatin (Lipitor) 20 MG tablet, Take 1 tablet by mouth Daily., Disp: 90 tablet, Rfl: 0  •  Fluticasone Furoate-Vilanterol (Breo Ellipta) 200-25 MCG/INH inhaler, Inhale 1 puff Daily., Disp: 1 each, Rfl: 2  •  hydroCHLOROthiazide (HYDRODIURIL) 25 MG tablet, Take 1 tablet by mouth Daily., Disp: 30 tablet, Rfl: 2  •  mupirocin (BACTROBAN) 2 % ointment, Use twice daily, Disp: 1 each, Rfl: 0  •  olmesartan (BENICAR) 40 MG tablet, Take 1 tablet by mouth Daily., Disp: 30 tablet, Rfl: 2  •  tamsulosin (FLOMAX) 0.4 MG capsule 24 hr capsule, Take 1 capsule by mouth Daily., Disp: 90 capsule, Rfl: 3    Allergies:   No Known Allergies    Objective     Physical Exam:  Vital Signs:   Vitals:    08/12/22 1146   BP: 156/77   Pulse: 95   Resp: 20   Temp: 97 °F (36.1 °C)   TempSrc: Temporal   SpO2: 96%   Weight: 110  kg (242 lb 14.4 oz)   PainSc: 0-No pain     Pain Score    08/12/22 1146   PainSc: 0-No pain     ECOG Performance Status: 0 - Asymptomatic    Constitutional: NAD, ECOG 0  Eyes: PERRLA, scleral anicteric  ENT: No LAD, no thyromegaly  Respiratory: CTAB, no wheezing, rales, rhonchi  Cardiovascular: RRR, no murmurs, pulses 2+ bilaterally  Abdomen: soft, NT/ND, no HSM  Musculoskeletal: strength 5/5 bilaterally, no c/c/e  Neurologic: A&O x 3, CN II-XII intact grossly    Results Review:   Procedure visit on 08/02/2022   Component Date Value Ref Range Status   • Color 08/02/2022 Yellow  Yellow, Straw, Dark Yellow, Ayala Final   • Clarity, UA 08/02/2022 Clear  Clear Final   • Specific Gravity  08/02/2022 1.025  1.005 - 1.030 Final   • pH, Urine 08/02/2022 6.0  5.0 - 8.0 Final   • Leukocytes 08/02/2022 Trace (A) Negative Final   • Nitrite, UA 08/02/2022 Negative  Negative Final   • Protein, POC 08/02/2022 Negative  Negative mg/dL Final   • Glucose, UA 08/02/2022 Negative  Negative mg/dL Final   • Ketones, UA 08/02/2022 Negative  Negative Final   • Urobilinogen, UA 08/02/2022 Normal  Normal Final   • Bilirubin 08/02/2022 Negative  Negative Final   • Blood, UA 08/02/2022 Negative  Negative Final   • Lot Number 08/02/2022 [id number]   Final   • Expiration Date 08/02/2022 12/21/23   Final       No results found.    Assessment / Plan      Assessment/Plan:   1. Leukocytosis, unspecified type (Primary)  -Unclear etiology although likely related to chronic inflammation.    -WBC range between 11.3-12.9 over the past 6 months  -Previously checked ESR and rheumatoid factor within normal limits  -Repeat WBC 17 in July 2022.  Differential predominately neutrophilic  -Flow cytometry with no immunophenotypic abnormalities  -Peripheral smear without immature cells or blast  -Leukocytosis secondary to inflammation at this time.  No indication for bone marrow biopsy  -Advised patient to follow-up with his PCP and have a CBC rechecked within  6 months to make sure his WBC is not significantly increasing      Follow Up:   Follow-up as needed     Hector Silva MD  Hematology and Oncology     Please note that portions of this note may have been completed with a voice recognition program. Efforts were made to edit the dictations, but occasionally words are mistranscribed.

## 2022-08-14 ENCOUNTER — HOSPITAL ENCOUNTER (OUTPATIENT)
Dept: CT IMAGING | Facility: HOSPITAL | Age: 72
Discharge: HOME | End: 2022-08-14
Admitting: STUDENT IN AN ORGANIZED HEALTH CARE EDUCATION/TRAINING PROGRAM

## 2022-08-14 DIAGNOSIS — R31.0 GROSS HEMATURIA: ICD-10-CM

## 2022-08-14 PROCEDURE — 74178 CT ABD&PLV WO CNTR FLWD CNTR: CPT

## 2022-08-14 PROCEDURE — 25010000002 IOPAMIDOL 61 % SOLUTION: Performed by: STUDENT IN AN ORGANIZED HEALTH CARE EDUCATION/TRAINING PROGRAM

## 2022-08-14 RX ADMIN — IOPAMIDOL 150 ML: 612 INJECTION, SOLUTION INTRAVENOUS at 14:34

## 2022-08-16 ENCOUNTER — TELEPHONE (OUTPATIENT)
Dept: UROLOGY | Facility: CLINIC | Age: 72
End: 2022-08-16

## 2022-08-16 NOTE — TELEPHONE ENCOUNTER
Patient's prostate volume [phone]  Cc volume based on CT urogram measurements.     Will call patient to discuss surgical options for BPH and LUTS.     No obvious findings on CT urogram to explain gross hematuria.     Rob Escobar MD

## 2022-08-23 PROBLEM — E78.5 HYPERLIPIDEMIA LDL GOAL <100: Status: ACTIVE | Noted: 2022-07-21

## 2022-08-23 PROBLEM — R42 DIZZINESS: Status: ACTIVE | Noted: 2022-08-23

## 2022-08-23 PROBLEM — I51.89 DIASTOLIC DYSFUNCTION: Status: ACTIVE | Noted: 2022-08-23

## 2022-08-24 ENCOUNTER — TELEPHONE (OUTPATIENT)
Dept: UROLOGY | Facility: CLINIC | Age: 72
End: 2022-08-24

## 2022-08-24 DIAGNOSIS — N40.1 BPH WITH OBSTRUCTION/LOWER URINARY TRACT SYMPTOMS: Primary | ICD-10-CM

## 2022-08-24 DIAGNOSIS — N13.8 BPH WITH OBSTRUCTION/LOWER URINARY TRACT SYMPTOMS: Primary | ICD-10-CM

## 2022-08-24 RX ORDER — FINASTERIDE 5 MG/1
5 TABLET, FILM COATED ORAL DAILY
Qty: 90 TABLET | Refills: 3 | Status: SHIPPED | OUTPATIENT
Start: 2022-08-24 | End: 2023-02-22 | Stop reason: SDUPTHER

## 2022-08-24 NOTE — TELEPHONE ENCOUNTER
I called the patient to review results of CT urogram, this demonstrates multiple bilateral renal cyst which we will monitor and extreme BPH with prostate size 150 to 187 cc. No obvious findings in regards to history of gross hematuria. Has completed flexible cystoscopy.       Patient's symptoms are relatively mild to moderate at this time, we discussed options which includes continue tamsulosin, the addition of finasteride which would be considered maximal medical therapy, or proceeding with surgical intervention to include laser enucleation of the prostate or robotic simple prostatectomy.  Patient states he is motivated to avoid surgical intervention at this time as he tries to lose weight and get healthy and we will plan to add finasteride, discussed potential side effects of erectile dysfunction and low libido.  We will plan to see the patient back in 6 months to review in clinic.    PLAN  - finasteride  + Flomax daily  - follow up 6 months in clinic, please call patient to arrange    Rob Escobar MD

## 2022-09-07 PROBLEM — Z82.49 FAMILY HISTORY OF ASCVD: Status: ACTIVE | Noted: 2022-09-07

## 2022-09-07 NOTE — PROGRESS NOTES
OFFICE VISIT  NOTE  [facility]ARDIOLOGY [facility]      Name: Justus Bishop    Date: 2022  MRN:  [id number]  :  1950      REFERRING/PRIMARY PROVIDER:  Misty Mai DO    Chief Complaint   Patient presents with   • diastolic dysfunction   • Hyperlipidemia   • Hypertension       HPI: Justus Bishop is a 72 y.o. male who presents today for new consultation for diastolic dysfunction.  Echo  showed normal ejection fraction with grade 1 diastolic dysfunction.  He recently moved from [place] to [place], has had a rough year with a divorce and some depression, and has gained some weight.  Reports some stable shortness of breath but likely from asthma.  Denies chest pain.  Cardiac work-up in [place] in 2020 include an echo which showed normal ejection fraction grade 1 diastolic dysfunction, normal event monitor, and relatively benign coronary calcium scan with a score of 26.    Past Medical History:   Diagnosis Date   • Anxiety 2019   • Asthma 2016   • Benign prostatic hyperplasia 2017   • Cataract 2006    Repaired   • Clotting disorder ([facility]) 2020    Intermittent (2 times, once recent)   • COPD (chronic obstructive pulmonary disease) ([facility])    • Depression 2019   • HL (hearing loss) 2000   • Hypertension 2018   • Substance abuse (HCC) 2019    resolved   • Tremor 2019       Past Surgical History:   Procedure Laterality Date   • CARDIAC CATHETERIZATION  2016    clear   • EYE SURGERY  2006    cataract       Social History     Socioeconomic History   • Marital status:    Tobacco Use   • Smoking status: Former Smoker     Packs/day: 0.50     Years: 5.00     Pack years: 2.50     Types: Cigarettes     Start date: 1970     Quit date: 1975     Years since quittin.7   • Smokeless tobacco: Never Used   Vaping Use   • Vaping Use: Never used   Substance and Sexual Activity   • Alcohol use: Not Currently     Comment: 0-3x/daily; <6x/week   • Drug use: Not  "Currently     Types: James     Comment: 0-3x/daily; 15x/monthly for 3 yrs prior to quitting in 2021   • Sexual activity: Not Currently     Partners: Female       Family History   Problem Relation Age of Onset   • Depression Mother    • Kidney disease Mother    • Cancer Father         Multiple Myeloma   • Early death Father    • Heart disease Father    • Hypertension Sister    • Heart disease Sister    • Pulmonary fibrosis Sister         ROS:   Constitutional no fever,  no weight loss   Skin no rash, no subcutaneous nodules   Otolaryngeal no difficulty swallowing   Cardiovascular See HPI   Pulmonary no cough, no sputum production   Gastrointestinal no constipation, no diarrhea   Genitourinary no dysuria, no hematuria   Hematologic no easy bruisability, no abnormal bleeding   Musculoskeletal no muscle pain   Neurologic no dizziness, no falls         No Known Allergies      Current Outpatient Medications:   •  albuterol sulfate  (90 Base) MCG/ACT inhaler, Inhale 2 puffs Every 4 (Four) Hours As Needed for Wheezing., Disp: 1 g, Rfl: 0  •  atorvastatin (Lipitor) 20 MG tablet, Take 1 tablet by mouth Daily., Disp: 90 tablet, Rfl: 0  •  finasteride (PROSCAR) 5 MG tablet, Take 1 tablet by mouth Daily., Disp: 90 tablet, Rfl: 3  •  Fluticasone Furoate-Vilanterol (Breo Ellipta) 200-25 MCG/INH inhaler, Inhale 1 puff Daily., Disp: 1 each, Rfl: 2  •  hydroCHLOROthiazide (HYDRODIURIL) 25 MG tablet, Take 1 tablet by mouth Daily., Disp: 30 tablet, Rfl: 2  •  olmesartan (BENICAR) 40 MG tablet, Take 1 tablet by mouth Daily., Disp: 30 tablet, Rfl: 2  •  tamsulosin (FLOMAX) 0.4 MG capsule 24 hr capsule, Take 1 capsule by mouth Daily., Disp: 90 capsule, Rfl: 3  •  mupirocin (BACTROBAN) 2 % ointment, Use twice daily, Disp: 1 each, Rfl: 0    Vitals:    09/08/22 1002   BP: 130/60   BP Location: Right arm   Patient Position: Sitting   Cuff Size: Adult   Pulse: 71   SpO2: 96%   Weight: 112 kg (247 lb)   Height: 177.8 cm (70\")     Body " mass index is 35.44 kg/m².    PHYSICAL EXAM:    General Appearance:   · well developed  · well nourished  HENT:   · oropharynx moist  · lips not cyanotic  Neck:  · thyroid not enlarged  · supple  Respiratory:  · no respiratory distress  · normal breath sounds  · no rales  Cardiovascular:  · no jugular venous distention  · regular rhythm  · apical impulse normal  · S1 normal, S2 normal  · no S3, no S4   · no murmur  · no rub, no thrill  · carotid pulses normal; no bruit  · lower extremity edema: none      Musculoskeletal:  · no clubbing of fingers.   · normocephalic, head atraumatic  Skin:   · warm, dry  Psychiatric:  · judgement and insight appropriate  · normal mood and affect    RESULTS:     ECG 12 Lead    Date/Time: 9/8/2022 10:24 AM  Performed by: Viraj Owusu MD  Authorized by: Viraj Owusu MD   Comparison: not compared with previous ECG   Rhythm: sinus rhythm  Rate: normal  Conduction: left anterior fascicular block  QRS axis: normal    Clinical impression: abnormal EKG            Results for orders placed during the hospital encounter of 06/27/22    Adult Transthoracic Echo Complete W/ Cont if Necessary Per Protocol    Interpretation Summary  · Left ventricular ejection fraction appears to be 56 - 60%. Left ventricular systolic function is normal.  · Left ventricular diastolic function is consistent with (grade I) impaired relaxation.  · Estimated right ventricular systolic pressure from tricuspid regurgitation is normal (<35 mmHg). Calculated right ventricular systolic pressure from tricuspid regurgitation is 23 mmHg.        Labs:  Lab Results   Component Value Date    TRIG 139 01/26/2022    HDL 42 01/26/2022     (H) 01/26/2022    AST 20 07/21/2022    ALT 21 07/21/2022     Lab Results   Component Value Date    HGBA1C 5.80 (H) 06/13/2022     No components found for: CREATINININE  eGFR Non  Am   Date Value Ref Range Status   01/26/2022 85 >60 mL/min/1.73 Final     Comment:     The MDRD  GFR formula is only valid for adults with stable  renal function between ages 18 and 70.         Most recent PCP note, imaging tests, and labs reviewed.  Reviewed previous echo, CT, and event monitor from [place] cardiologist    ASSESSMENT:  Problem List Items Addressed This Visit        Cardiac and Vasculature    Essential hypertension - Primary    Hyperlipidemia LDL goal <100    Diastolic dysfunction    Overview     6/27/22 Echo: EF 56-60%, grade I impaired relaxation              Family History    Family history of ASCVD    Overview     2/14/20 Calcium score 13.1            Symptoms and Signs    Dizziness    Overview     2/14/20 Coronary CTA @ [facility]: Total calcium 13.1  1/2020 14-Day Holter: Normal  1/6/20 Echo @ [facility]: EF 60-65%, impaired relaxation pattern of LV diastolic filling               PLAN:    1.  Chronic diastolic heart failure:  NYHA II-3  Continue working on weight loss, exercise, low-sodium diet, blood pressure control.  He continues to have shortness of breath or other signs of heart failure such as lower extreme edema, I would consider adding empagliflozin 10 mg daily    2.  Hypertension:  Much better control on HCTZ and olmesartan, continue for now, goal less than 130/80    3.  Hyperlipidemia:  Agree with atorvastatin given family history of CAD  Goal LDL less than 100    Advance Care Planning   ACP discussion was held with the patient during this visit. Patient does not have an advance directive, information provided.         Return to clinic in 12 months, or sooner as needed.    Thank you for the opportunity to share in the care of your patient; please do not hesitate to call me with any questions.     Viraj Owusu MD, [facility]  Office: [phone]  [facility] [place] [address]    09/08/22

## 2022-09-08 ENCOUNTER — OFFICE VISIT (OUTPATIENT)
Dept: CARDIOLOGY | Facility: CLINIC | Age: 72
End: 2022-09-08

## 2022-09-08 VITALS
SYSTOLIC BLOOD PRESSURE: 130 MMHG | OXYGEN SATURATION: 96 % | WEIGHT: 247 LBS | DIASTOLIC BLOOD PRESSURE: 60 MMHG | HEART RATE: 71 BPM | BODY MASS INDEX: 35.36 KG/M2 | HEIGHT: 70 IN

## 2022-09-08 DIAGNOSIS — I10 ESSENTIAL HYPERTENSION: Primary | ICD-10-CM

## 2022-09-08 DIAGNOSIS — I51.89 DIASTOLIC DYSFUNCTION: ICD-10-CM

## 2022-09-08 DIAGNOSIS — Z82.49 FAMILY HISTORY OF ASCVD: ICD-10-CM

## 2022-09-08 DIAGNOSIS — E78.5 HYPERLIPIDEMIA LDL GOAL <100: ICD-10-CM

## 2022-09-08 DIAGNOSIS — R42 DIZZINESS: ICD-10-CM

## 2022-09-08 PROCEDURE — 99204 OFFICE O/P NEW MOD 45 MIN: CPT | Performed by: INTERNAL MEDICINE

## 2022-09-08 PROCEDURE — 93000 ELECTROCARDIOGRAM COMPLETE: CPT | Performed by: INTERNAL MEDICINE

## 2022-09-20 RX ORDER — ATORVASTATIN CALCIUM 20 MG/1
TABLET, FILM COATED ORAL
Qty: 90 TABLET | Refills: 0 | Status: SHIPPED | OUTPATIENT
Start: 2022-09-20 | End: 2023-01-03

## 2022-09-20 RX ORDER — OLMESARTAN MEDOXOMIL 40 MG/1
TABLET ORAL
Qty: 30 TABLET | Refills: 2 | Status: SHIPPED | OUTPATIENT
Start: 2022-09-20 | End: 2022-12-27

## 2022-10-22 RX ORDER — HYDROCHLOROTHIAZIDE 25 MG/1
TABLET ORAL
Qty: 30 TABLET | Refills: 2 | Status: SHIPPED | OUTPATIENT
Start: 2022-10-22 | End: 2023-01-31

## 2022-11-03 ENCOUNTER — OFFICE VISIT (OUTPATIENT)
Dept: FAMILY MEDICINE CLINIC | Facility: CLINIC | Age: 72
End: 2022-11-03

## 2022-11-03 VITALS
BODY MASS INDEX: 36.79 KG/M2 | HEIGHT: 70 IN | TEMPERATURE: 98 F | HEART RATE: 93 BPM | WEIGHT: 257 LBS | RESPIRATION RATE: 19 BRPM | DIASTOLIC BLOOD PRESSURE: 82 MMHG | SYSTOLIC BLOOD PRESSURE: 124 MMHG | OXYGEN SATURATION: 95 %

## 2022-11-03 DIAGNOSIS — S01.90XA CHRONIC WOUND OF HEAD: Primary | ICD-10-CM

## 2022-11-03 DIAGNOSIS — M25.511 CHRONIC RIGHT SHOULDER PAIN: ICD-10-CM

## 2022-11-03 DIAGNOSIS — G89.29 CHRONIC RIGHT SHOULDER PAIN: ICD-10-CM

## 2022-11-03 DIAGNOSIS — M25.511 CHRONIC RIGHT SHOULDER PAIN: Primary | ICD-10-CM

## 2022-11-03 DIAGNOSIS — G89.29 CHRONIC RIGHT SHOULDER PAIN: Primary | ICD-10-CM

## 2022-11-03 PROCEDURE — 90662 IIV NO PRSV INCREASED AG IM: CPT | Performed by: STUDENT IN AN ORGANIZED HEALTH CARE EDUCATION/TRAINING PROGRAM

## 2022-11-03 PROCEDURE — 99214 OFFICE O/P EST MOD 30 MIN: CPT | Performed by: STUDENT IN AN ORGANIZED HEALTH CARE EDUCATION/TRAINING PROGRAM

## 2022-11-03 PROCEDURE — G0008 ADMIN INFLUENZA VIRUS VAC: HCPCS | Performed by: STUDENT IN AN ORGANIZED HEALTH CARE EDUCATION/TRAINING PROGRAM

## 2022-11-03 NOTE — PROGRESS NOTES
"Chief Complaint  Wound Infection     History of Present Illness    He is worried about the chronic rash on his face. He says it is slowly getting better. He tries to keep it clean with cetaphil. He has not used mupirocin in a while he says and does not have any at home. No fever. The rash is not worsening. He says he does pick at this rash.     He is ready to discuss his shoulder pain. This is a chronic problem. He says years ago he was doing push ups in the past and he was told in the past he could have a rotator cuff issues. No numbness tingling or burning.      The following portions of the patient's history were reviewed and updated as appropriate: allergies, current medications, past family history, past medical history, past social history, past surgical history, and problem list.    OBJECTIVE:  /82   Pulse 93   Temp 98 °F (36.7 °C)   Resp 19   Ht 177.8 cm (70\")   Wt 117 kg (257 lb)   SpO2 95%   BMI 36.88 kg/m²       Physical Exam  Constitutional:       General: He is not in acute distress.     Appearance: Normal appearance.   HENT:      Head: Normocephalic and atraumatic.   Eyes:      Extraocular Movements: Extraocular movements intact.   Cardiovascular:      Rate and Rhythm: Normal rate and regular rhythm.      Heart sounds: No murmur heard.  Pulmonary:      Effort: Pulmonary effort is normal. No respiratory distress.      Breath sounds: Normal breath sounds. No stridor. No wheezing, rhonchi or rales.   Musculoskeletal:      Comments: Right uper extremity with normal muscle strength sensations and pulses.   Painful arc present at about 60 degrees.   Lift off test limited by decreased rom   Skin:     Findings: Rash (chronic scarring on his lower face bilaterally and upper back present with chronic excoriations no drainage or pus) present.   Neurological:      General: No focal deficit present.      Mental Status: He is alert.   Psychiatric:         Mood and Affect: Mood normal.              "       Assessment and Plan   Diagnoses and all orders for this visit:    1. Chronic wound of head (Primary)  -     Ambulatory Referral to Dermatology    2. Chronic right shoulder pain  -     XR Shoulder 2+ View Right (In Office)    Other orders  -     mupirocin (BACTROBAN) 2 % ointment; Use twice daily  Dispense: 1 each; Refill: 0          He had follow up with dermatology in august but he says he did not go as he is not happy with his current dermatologist as he says he does not want to be on doxycycline given this med is expensive and he does not feel it worked for him. Will refer him to dermatology at . Will give topical mupirocin and have him call us for any worsening of his rash or fever. Continue with cetaphil and avoid picking.    Advised him to see psychiatry regarding continued picking at the lesions causing chronic wounds. He declines.    Check xray of right shoulder. He is agreeable to pt. Will consider referral to ortho post xray.       Return in about 3 months (around 2/3/2023).       Misty Mai D.O.  [facility] [place]

## 2022-11-14 ENCOUNTER — OFFICE VISIT (OUTPATIENT)
Dept: ORTHOPEDIC SURGERY | Facility: CLINIC | Age: 72
End: 2022-11-14

## 2022-11-14 VITALS
BODY MASS INDEX: 36.93 KG/M2 | SYSTOLIC BLOOD PRESSURE: 165 MMHG | WEIGHT: 257.94 LBS | HEIGHT: 70 IN | DIASTOLIC BLOOD PRESSURE: 100 MMHG

## 2022-11-14 DIAGNOSIS — M19.011 OSTEOARTHRITIS OF GLENOHUMERAL JOINT, RIGHT: ICD-10-CM

## 2022-11-14 DIAGNOSIS — M75.111 NONTRAUMATIC INCOMPLETE TEAR OF RIGHT ROTATOR CUFF: Primary | ICD-10-CM

## 2022-11-14 DIAGNOSIS — M19.011 OSTEOARTHRITIS OF RIGHT ACROMIOCLAVICULAR JOINT: ICD-10-CM

## 2022-11-14 PROCEDURE — 99204 OFFICE O/P NEW MOD 45 MIN: CPT | Performed by: STUDENT IN AN ORGANIZED HEALTH CARE EDUCATION/TRAINING PROGRAM

## 2022-11-14 RX ORDER — MELOXICAM 7.5 MG/1
7.5 TABLET ORAL
Qty: 30 TABLET | Refills: 0 | Status: SHIPPED | OUTPATIENT
Start: 2022-11-14

## 2022-11-14 NOTE — PROGRESS NOTES
[facility] [place]     Office Visit       Date: 11/14/2022   Patient Name: Justus Bishop  MRN: [id number]  YOB: 1950    Referring Physician: Misty Mai DO     Chief Complaint:   Chief Complaint   Patient presents with   • Right Shoulder - Pain       History of Present Illness: Justus Bishop is a 72 y.o. male who is here today for evaluation of right shoulder pain.  He reports onset of symptoms as far back as a year and a half ago.  He initially hurt his shoulder while doing push-ups.  However, symptoms have persistent despite treatment with physical therapy and medication.  His current pain is a 4/10 and described as aching and shooting.  In addition to pain, he has popping and buckling of the shoulder.  His pain is exacerbated by virtually any movement as well as lying on the affected side.  He denies any recent fall or trauma.  No numbness or tingling.  No previous history of shoulder injury or surgery.    He has also been dealing with a skin disorder for multiple months.  He has been on multiple medications including doxycycline and minocycline.  He is scheduled to see his dermatologist next February.  He believes that skin irritation is causing worsening shoulder pain.    Subjective   Review of Systems: Review of Systems   Constitutional: Negative for chills, fever, unexpected weight gain and unexpected weight loss.   HENT: Negative for congestion, postnasal drip and rhinorrhea.    Eyes: Negative for blurred vision.   Respiratory: Negative for shortness of breath.    Cardiovascular: Negative for leg swelling.   Gastrointestinal: Negative for abdominal pain, nausea and vomiting.   Genitourinary: Negative for difficulty urinating.   Musculoskeletal: Positive for arthralgias. Negative for gait problem, joint swelling and myalgias.   Skin: Negative for skin lesions and wound.   Neurological: Negative for dizziness, weakness,  light-headedness and numbness.   Hematological: Does not bruise/bleed easily.   Psychiatric/Behavioral: Negative for depressed mood.        Past Medical History:   Past Medical History:   Diagnosis Date   • Anxiety 2019   • Asthma 2016   • Benign prostatic hyperplasia 2017   • Cataract 2006    Repaired   • Clotting disorder ([facility]) 2020    Intermittent (2 times, once recent)   • COPD (chronic obstructive pulmonary disease) ([facility])    • Depression 2019   • HL (hearing loss) 2000   • Hypertension    • Substance abuse ([facility]) 2019    resolved   • Tremor 2019       Past Surgical History:   Past Surgical History:   Procedure Laterality Date   • CARDIAC CATHETERIZATION  2016    clear   • EYE SURGERY  2006    cataract       Family History:   Family History   Problem Relation Age of Onset   • Depression Mother    • Kidney disease Mother    • Cancer Father         Multiple Myeloma   • Early death Father    • Heart disease Father    • Hypertension Sister    • Heart disease Sister    • Pulmonary fibrosis Sister        Social History:   Social History     Socioeconomic History   • Marital status:    Tobacco Use   • Smoking status: Former     Packs/day: 0.50     Years: 5.00     Pack years: 2.50     Types: Cigarettes     Start date: 1970     Quit date: 1975     Years since quittin.9   • Smokeless tobacco: Never   Vaping Use   • Vaping Use: Never used   Substance and Sexual Activity   • Alcohol use: Not Currently     Comment: 0-3x/daily; <6x/week   • Drug use: Not Currently     Types: Hashish     Comment: 0-3x/daily; 15x/monthly for 3 yrs prior to quitting in    • Sexual activity: Not Currently     Partners: Female       Medications:   Current Outpatient Medications:   •  albuterol sulfate  (90 Base) MCG/ACT inhaler, Inhale 2 puffs Every 4 (Four) Hours As Needed for Wheezing., Disp: 1 g, Rfl: 0  •  atorvastatin (LIPITOR) 20 MG tablet, TAKE ONE TABLET BY MOUTH DAILY, Disp: 90 tablet, Rfl: 0  •   "finasteride (PROSCAR) 5 MG tablet, Take 1 tablet by mouth Daily., Disp: 90 tablet, Rfl: 3  •  Fluticasone Furoate-Vilanterol (Breo Ellipta) 200-25 MCG/INH inhaler, Inhale 1 puff Daily., Disp: 1 each, Rfl: 2  •  hydroCHLOROthiazide (HYDRODIURIL) 25 MG tablet, TAKE ONE TABLET BY MOUTH DAILY, Disp: 30 tablet, Rfl: 2  •  mupirocin (BACTROBAN) 2 % ointment, Use twice daily, Disp: 1 each, Rfl: 0  •  olmesartan (BENICAR) 40 MG tablet, TAKE ONE TABLET BY MOUTH DAILY, Disp: 30 tablet, Rfl: 2  •  tamsulosin (FLOMAX) 0.4 MG capsule 24 hr capsule, Take 1 capsule by mouth Daily., Disp: 90 capsule, Rfl: 3  •  meloxicam (MOBIC) 7.5 MG tablet, Take 1 tablet by mouth Daily With Breakfast., Disp: 30 tablet, Rfl: 0    Allergies: No Known Allergies    I reviewed the patient's chief complaint, history of present illness, review of systems, past medical history, surgical history, family history, social history, medications and allergy list.     Objective    Vital Signs:   Vitals:    11/14/22 1016   BP: 165/100   Weight: 117 kg (257 lb 15 oz)   Height: 177.8 cm (70\")     Body mass index is 37.01 kg/m².   Class 2 Severe Obesity (BMI >=35 and <=39.9). Obesity-related health conditions include the following: hypertension, coronary heart disease, diabetes mellitus and dyslipidemias. Obesity is worsening. BMI is is above average; BMI management plan is completed. We discussed portion control and increasing exercise.     Patient reports that he is a former smoker and quit in 1975.  He has not resumed smoking since that time.  This behavior was applauded and she was encouraged to continue in smoking cessation.  We will continue to monitor at subsequent visits.    Ortho Exam:  Constitutional: Well developed. Well nourished.  Psychologic: Mood is appropriate. Appropriate affect.  Head and Face: Normocephalic. No obvious deformities. External Ears Normal, no lesions or masses, grossly normal hearing.  Eyes: Extraocular movements intact  Pulmonary: " Unlabored, normal effort.  Cardiac: well perfused, extremities pink.  Abdomen: non-distended  Peripheral vascular: normal, pulses intact, sensation intact  Skin: No rashes on exposed skin surface.  Neuro: AOx3, No short or long term memory impairment.  Right shoulder: No erythema ecchymosis or swelling.  Tender to palpation of both the anterior and lateral aspects of the shoulder.  He is tender at the rotator cuff insertion as well as the glenohumeral and AC joints.  He has full range of motion in abduction and flexion of the shoulder 0 to 160 degrees but with pain after 110 degrees.  Tender laterally with resisted external rotation.  Positive Hernandez test.  Negative drop arm.  Positive crossarm test at the AC joint.  Negative anterior apprehension.  Sensation intact to light touch.  5/5 strength.    Results Review:   Imaging Results (Last 24 Hours)     ** No results found for the last 24 hours. **      XR Shoulder 2+ View Right (In Office) (11/03/2022 11:06)  Indication: Right shoulder pain    Views:AP lateral and scapular Y views of the shoulder are submitted.    Impression: There is no fracture subluxation or dislocation. The glenohumeral joint space is mildly reduced.  There is also mild reduction of the AC joint space.  Mild elevation of the humeral head.    Comparison: No images were available for comparison review.      Procedures    Assessment / Plan    Assessment/Plan:   Diagnoses and all orders for this visit:    1. Nontraumatic incomplete tear of right rotator cuff (Primary)  -     MRI Shoulder Right Without Contrast; Future    2. Osteoarthritis of glenohumeral joint, right  -     meloxicam (MOBIC) 7.5 MG tablet; Take 1 tablet by mouth Daily With Breakfast.  Dispense: 30 tablet; Refill: 0    3. Osteoarthritis of right acromioclavicular joint      Multiple months of global shoulder pain after push-up incident 18 months ago.  His pain is most severe at the lateral shoulder over the rotator cuff insertion.   His radiographs do show glenohumeral joint space narrowing as well as mild elevation of the humeral head.  His symptoms have continued to worsen and now affect his daily life especially sleep as well as being as active as he would like to be.  As result, he has gained 50 pounds.  He would like more definitive answers.  He does have pain on exam with motion as well as resisted testing.  After discussion of his exam and x-ray findings, he was presented with multiple treatment options including medications, imaging, therapy, injections, and surgical referral.  At this time, we will plan to obtain MRI of the right shoulder to evaluate the extent of the rotator cuff injury.  We will start him on anti-inflammatory medication with meloxicam to control his pain.  He is already scheduled to start physical therapy next month as per his primary doctor and will have him continue this.  We will see him back at MRI discuss results and neck steps in his treatment.  I believe the MRI is medically necessary to guide the ongoing management of his extensive shoulder injuries.    Follow Up:   Return for Recheck - after MRI.      Roger Cook MD  [facility]

## 2022-11-29 NOTE — TELEPHONE ENCOUNTER
Rx Refill Note  Requested Prescriptions     Pending Prescriptions Disp Refills   • mupirocin (BACTROBAN) 2 % ointment [Pharmacy Med Name: MUPIROCIN 2% OINTMENT] 22 g      Sig: APPLY TO AFFECTED AREA(S) TWO TIMES A DAY      Last office visit with prescribing clinician: 11/3/2022   Last telemedicine visit with prescribing clinician: 2/3/2023   Next office visit with prescribing clinician: 2/3/2023   {TIP  Encounters:23}                      Would you like a call back once the refill request has been completed: [] Yes [] No    If the office needs to give you a call back, can they leave a voicemail: [] Yes [] No    Emilee Brown MA  11/29/22, 11:45 EST

## 2022-12-05 ENCOUNTER — TREATMENT (OUTPATIENT)
Dept: PHYSICAL THERAPY | Facility: CLINIC | Age: 72
End: 2022-12-05

## 2022-12-05 DIAGNOSIS — M25.511 CHRONIC RIGHT SHOULDER PAIN: Primary | ICD-10-CM

## 2022-12-05 DIAGNOSIS — M67.911 DISORDER OF RIGHT ROTATOR CUFF: ICD-10-CM

## 2022-12-05 DIAGNOSIS — G89.29 CHRONIC RIGHT SHOULDER PAIN: Primary | ICD-10-CM

## 2022-12-05 DIAGNOSIS — M75.41 IMPINGEMENT SYNDROME OF RIGHT SHOULDER: ICD-10-CM

## 2022-12-05 PROCEDURE — 97140 MANUAL THERAPY 1/> REGIONS: CPT | Performed by: PHYSICAL THERAPIST

## 2022-12-05 PROCEDURE — 97110 THERAPEUTIC EXERCISES: CPT | Performed by: PHYSICAL THERAPIST

## 2022-12-05 PROCEDURE — 97161 PT EVAL LOW COMPLEX 20 MIN: CPT | Performed by: PHYSICAL THERAPIST

## 2022-12-05 NOTE — PROGRESS NOTES
Physical Therapy Initial Evaluation and Plan of Care  [facility] [place]  [address] [facility], [address] [address]        Patient: Justus Bishop   : 1950  Diagnosis/ICD-10 Code:  Chronic right shoulder pain [M25.511, G89.29]  Referring practitioner: Misty Mai DO  Date of Initial Visit: 2022  Today's Date: 2022  Patient seen for 1 sessions         Visit Diagnoses:    ICD-10-CM ICD-9-CM   1. Chronic right shoulder pain  M25.511 719.41    G89.29 338.29   2. Impingement syndrome of right shoulder  M75.41 726.2   3. Disorder of right rotator cuff  M67.911 726.10         Subjective Questionnaire: QuickDASH: 34.09    Subjective Evaluation    History of Present Illness  Mechanism of injury: 1st hurt the right shoulder 50 years throwing soft ball.  Dealing with a staph infection about 4 years ago, past 18 months the staff infection started affecting shoulder. But 18 months was doing push up, right shoulder gave away, with a lot of pain. Saw an orthopedic in [place], thought he tore RTC.  Recent saw Dr Kamara, schedule for MRI.      Pain  Current pain ratin  At best pain ratin  At worst pain ratin  Location: Constant right anterior GH joint pain, intermittent medial scapula border.  Occasionally goes down to right elbow.  Relieving factors: medications and heat (Mobic)  Aggravating factors: lifting, outstretched reach, overhead activity and sleeping (Throwing)  Progression: no change    Hand dominance: right             Objective          Static Posture     Head  Forward.    Scapulae  Left protracted and right protracted.    Thoracic Spine  Hyperkyphosis.    Postural Observations  Seated posture: fair  Standing posture: fair        Tenderness     Right Shoulder  Tenderness in the biceps tendon (proximal), bicipital groove, subacromial bursa and supraspinatus tendon. No tenderness in the AC joint.     Active Range of Motion   Cervical/Thoracic Spine    Cervical    Flexion: 55 (Right scapula medial border pain) degrees with pain  Extension: 28 degrees   Left lateral flexion: 65 degrees   Right lateral flexion: 50 degrees   Left rotation: 30 degrees   Right rotation: 30 degrees     Right Shoulder   Flexion: 148 degrees with pain  Extension: 44 degrees with pain  Abduction: 122 degrees with pain  Adduction: 6 degrees   External rotation 90°: 58 degrees  Internal rotation 90°: 27 degrees with pain    Scapular Mobility     Right Shoulder   Scapular mobility: fair  Scapular Mobility with Shoulder to 90° FF   Scapular winging: moderate    Scapular Mobility beyond 90° FF   Scapular winging: minimal  Scapular elevation: minimal    Strength/Myotome Testing     Right Shoulder     Planes of Motion   Flexion: 4+   Abduction: 4   External rotation at 0°: 4+   Internal rotation at 0°: 5     Isolated Muscles   Biceps: 5   Lower trapezius: 4+   Middle trapezius: 4+   Supraspinatus: 4-   Triceps: 5     Tests     Right Shoulder   Positive anterior load and shift, empty can, Hawkin's, Neer's and Speed's (Mildly +).   Negative AC shear and sulcus sign.           Assessment & Plan     Assessment  Impairments: abnormal or restricted ROM, activity intolerance, impaired physical strength and pain with function  Functional Limitations: carrying objects, lifting, pulling, pushing, uncomfortable because of pain, reaching behind back and reaching overhead  Assessment details: 72 year old right hand male presenting with chronic right shoulder pain.  They present signs and symptoms of impingement syndrome/RTC pathology/subarcomial bursitis/mild bicipital tendinitis.  Postural dysfunction may be contributing to symptoms, scapular stabilizers are weak as well.  Neurological exam is normal today, cervical exam is normal today.  Prognosis: good    Goals  Plan Goals: STG to be met in 4 weeks  1. Increase right  shoulder flexion  ROM to 160 degrees with tolerable discomfort to be able to place  objects on shelves over head.  2. Increase right shoulder internal  rotation ROM to 50 degrees to assist with less painful dressing.  3. Pt reports decrease in worst pain level to 5/10, so that the pt can perform more activities at home and work.  4. Pt has improved functional activities so that Quick Dash score improves to 26%.  5. Pt rigth scapular strength is 5/5 so that there is reduction in impingement motions.  LTG to be met in 12 weeks  1. Pt is independent with each phase of HEP.  2. Pt is able sleep through night without being awaken by shoulder pain.  3. Pt can bear full weight through right UE without shoulder pain so that they can crawl and push objects as needs.  4.  Pt has full right shoulder AROM to be able to do all need activities at home and work.  5.  Pt shoulder girdle strength is 5/5 throughout to perform all lifting activities as needed.    Plan  Therapy options: will be seen for skilled therapy services  Planned modality interventions: iontophoresis, cryotherapy, dry needling, ultrasound and high voltage pulsed current (pain management)  Planned therapy interventions: abdominal trunk stabilization, manual therapy, neuromuscular re-education, body mechanics training, flexibility, functional ROM exercises, joint mobilization, home exercise program, stretching, strengthening, therapeutic activities, soft tissue mobilization and postural training  Frequency: 2x week  Duration in weeks: 12  Treatment plan discussed with: patient      Access Code: [email or web address]  URL: [email or web address]  Date: 12/05/2022  Prepared by: True Andrew    Exercises  Supine Static Chest Stretch on Foam Roll - 2 x daily - 7 x weekly - 1 sets - 1 reps - 1 minutes hold  Seated Thoracic Lumbar Extension with Pectoralis Stretch - 2 x daily - 7 x weekly - 1 sets - 30 reps  Seated Shoulder Flexion Table Top Stretch - 2 x daily - 7 x weekly - 3 sets - 10 reps    Timed:         Manual Therapy:    20     mins  14307;      Therapeutic Exercise:    24     mins  86259;       Un-Timed:  Low Eval     20     Mins  72351          Timed Treatment:   44   mins   Total Treatment:     64   mins          PT: True Andrew PT     License Number: [id number]  Electronically signed by True Andrew PT, 12/05/22, 9:13 AM EST    Medicare Initial Certification  Certification Period: 3/5/2023  I certify that the therapy services are furnished while this patient is under my care.  The services outlined above are required by this patient, and will be reviewed every 90 days.     PHYSICIAN: ________________________________________________________  Misty Mai DO        DATE: ____________________________________________________________    Please sign and return via fax to [phone].. Thank you, [facility].

## 2022-12-07 DIAGNOSIS — L08.9 SKIN INFECTION: Primary | ICD-10-CM

## 2022-12-09 ENCOUNTER — TREATMENT (OUTPATIENT)
Dept: PHYSICAL THERAPY | Facility: CLINIC | Age: 72
End: 2022-12-09

## 2022-12-09 DIAGNOSIS — M25.511 CHRONIC RIGHT SHOULDER PAIN: Primary | ICD-10-CM

## 2022-12-09 DIAGNOSIS — G89.29 CHRONIC RIGHT SHOULDER PAIN: Primary | ICD-10-CM

## 2022-12-09 DIAGNOSIS — M75.41 IMPINGEMENT SYNDROME OF RIGHT SHOULDER: ICD-10-CM

## 2022-12-09 DIAGNOSIS — M67.911 DISORDER OF RIGHT ROTATOR CUFF: ICD-10-CM

## 2022-12-09 PROCEDURE — 97110 THERAPEUTIC EXERCISES: CPT | Performed by: PHYSICAL THERAPIST

## 2022-12-09 PROCEDURE — 97140 MANUAL THERAPY 1/> REGIONS: CPT | Performed by: PHYSICAL THERAPIST

## 2022-12-09 NOTE — PROGRESS NOTES
Physical Therapy Daily Treatment Note  [facility] [place]  [address] [facility] [[email or web address] [address]  [facility] [address]      Patient: Justus Bishop   : 1950  Diagnosis/ICD-10 Code:  Chronic right shoulder pain [M25.511, G89.29]  Referring practitioner: Misty Mai DO  Date of Initial Visit: Type: THERAPY  Noted: 2022  Today's Date: 2022  Patient seen for 2 sessions         Visit Diagnoses:    ICD-10-CM ICD-9-CM   1. Chronic right shoulder pain  M25.511 719.41    G89.29 338.29   2. Impingement syndrome of right shoulder  M75.41 726.2   3. Disorder of right rotator cuff  M67.911 726.10       Subjective   Justus Bishop reports: feels some of the HEP are helpful, but the thoracic extension causes some shoulder pain when grabbing the back the neck.  Feels the shoulder is moving some better.    Objective   ROM RIGHT SHOULDER:  Flexion: 161 degrees with pain  Extension: 45 degrees with pain  Abduction: 145 degrees with pain  External rotation 90°: 65 degrees with pain  Internal rotation 90°: 39 degrees with pain  OTHER: Capsular end feel.  See Exercise, Manual, and Modality Logs for complete treatment.       Assessment/Plan  Signs and symptoms of adhesive capsulitis with some impingement.  ROM has improved since last treatment.  Thoracic posturing needs to improving  Progress per Plan of Care and Progress strengthening /stabilization /functional activity           Timed:         Manual Therapy:    20     mins  09836;     Therapeutic Exercise:    35     mins  44674;     Neuromuscular Tammi:        mins  79101;    Therapeutic Activity:          mins  84679;     Gait Training:           mins  83215;     Ultrasound:          mins  06726;    Ionto                                   mins   94506  Self Care                            mins   09150  Canalith Repos         mins 13653      Un-Timed:  Electrical Stimulation:         mins  35972 ( );  Dry Needling          mins  self-pay  Traction          mins 62655      Timed Treatment:   55   mins   Total Treatment:     55   mins    True Andrew, PT  [place] License: [id number]

## 2022-12-14 ENCOUNTER — TREATMENT (OUTPATIENT)
Dept: PHYSICAL THERAPY | Facility: CLINIC | Age: 72
End: 2022-12-14

## 2022-12-14 DIAGNOSIS — M67.911 DISORDER OF RIGHT ROTATOR CUFF: ICD-10-CM

## 2022-12-14 DIAGNOSIS — M25.511 CHRONIC RIGHT SHOULDER PAIN: Primary | ICD-10-CM

## 2022-12-14 DIAGNOSIS — G89.29 CHRONIC RIGHT SHOULDER PAIN: Primary | ICD-10-CM

## 2022-12-14 DIAGNOSIS — M75.41 IMPINGEMENT SYNDROME OF RIGHT SHOULDER: ICD-10-CM

## 2022-12-14 PROCEDURE — 97110 THERAPEUTIC EXERCISES: CPT | Performed by: PHYSICAL THERAPIST

## 2022-12-14 PROCEDURE — 97140 MANUAL THERAPY 1/> REGIONS: CPT | Performed by: PHYSICAL THERAPIST

## 2022-12-14 PROCEDURE — 97530 THERAPEUTIC ACTIVITIES: CPT | Performed by: PHYSICAL THERAPIST

## 2022-12-15 NOTE — PROGRESS NOTES
Physical Therapy Daily Treatment Note  [facility] [place]  [address] [facility] [address]  [facility] [address]      Patient: Justus Bishop   : 1950  Diagnosis/ICD-10 Code:  Chronic right shoulder pain [M25.511, G89.29]  Referring practitioner: Misty Mai DO  Date of Initial Visit: Type: THERAPY  Noted: 2022  Today's Date: 2022  Patient seen for 3 sessions         Visit Diagnoses:    ICD-10-CM ICD-9-CM   1. Chronic right shoulder pain  M25.511 719.41    G89.29 338.29   2. Impingement syndrome of right shoulder  M75.41 726.2   3. Disorder of right rotator cuff  M67.911 726.10       Subjective   Justus Bishop reports: was too aggressive with HEP with behind back exercise, seems to have flared shoulder up.  Pain is mostly in the back of the shoulder.    Objective   ROM RIGHT SHOULDER:  Flexion: 162 degrees with pain  Extension: 44 degrees with pain  Abduction: 138 degrees with pain  External rotation 90°: 65 degrees with pain  Internal rotation 90°: 35 degrees with pain  OTHER: Capsular end feel.  PALPATION: tender medial boarder of right scapula.  Mildly tender at biceps tendon.  OBSERVATION: open wound small inferior border of upper trap  See Exercise, Manual, and Modality Logs for complete treatment.       Assessment/Plan  Pt has mild adhesive capsulitis, but experiencing a lot of medial scapular border pain, into rhomboids.  May be over using rhomboids.  No winging present that I have seen.  Progress per Plan of Care and Progress strengthening /stabilization /functional activity           Timed:         Manual Therapy:    21     mins  49368;     Therapeutic Exercise:    17     mins  48549;     Neuromuscular Tammi:        mins  03304;    Therapeutic Activity:     10     mins  70571;     Gait Training:           mins  19354;     Ultrasound:          mins  76934;    Ionto                                   mins   69816  Self Care                            mins    38316  [facility]         mins 57441      Un-Timed:  Electrical Stimulation:         mins  45708 ( );  Dry Needling          mins self-pay  Traction          mins 79477      Timed Treatment:   48   mins   Total Treatment:     48   mins    True Andrew, PT  [place] License: [id number]

## 2022-12-27 RX ORDER — OLMESARTAN MEDOXOMIL 40 MG/1
TABLET ORAL
Qty: 30 TABLET | Refills: 2 | Status: SHIPPED | OUTPATIENT
Start: 2022-12-27 | End: 2023-04-04

## 2022-12-29 ENCOUNTER — HOSPITAL ENCOUNTER (OUTPATIENT)
Dept: MRI IMAGING | Facility: HOSPITAL | Age: 72
Discharge: HOME | End: 2022-12-29
Admitting: STUDENT IN AN ORGANIZED HEALTH CARE EDUCATION/TRAINING PROGRAM

## 2022-12-29 DIAGNOSIS — M75.111 NONTRAUMATIC INCOMPLETE TEAR OF RIGHT ROTATOR CUFF: ICD-10-CM

## 2022-12-29 PROCEDURE — 73221 MRI JOINT UPR EXTREM W/O DYE: CPT

## 2023-01-03 ENCOUNTER — TELEPHONE (OUTPATIENT)
Dept: ORTHOPEDIC SURGERY | Facility: CLINIC | Age: 73
End: 2023-01-03
Payer: MEDICARE

## 2023-01-03 RX ORDER — ATORVASTATIN CALCIUM 20 MG/1
TABLET, FILM COATED ORAL
Qty: 90 TABLET | Refills: 0 | Status: SHIPPED | OUTPATIENT
Start: 2023-01-03 | End: 2023-04-04

## 2023-01-05 ENCOUNTER — OFFICE VISIT (OUTPATIENT)
Dept: ORTHOPEDIC SURGERY | Facility: CLINIC | Age: 73
End: 2023-01-05
Payer: MEDICARE

## 2023-01-05 VITALS
DIASTOLIC BLOOD PRESSURE: 70 MMHG | BODY MASS INDEX: 35.79 KG/M2 | WEIGHT: 250 LBS | HEIGHT: 70 IN | SYSTOLIC BLOOD PRESSURE: 144 MMHG

## 2023-01-05 DIAGNOSIS — M75.81 ROTATOR CUFF TENDINITIS, RIGHT: Primary | ICD-10-CM

## 2023-01-05 DIAGNOSIS — M75.21 BICEPS TENDINITIS OF RIGHT SHOULDER: ICD-10-CM

## 2023-01-05 DIAGNOSIS — M19.011 OSTEOARTHRITIS OF RIGHT ACROMIOCLAVICULAR JOINT: ICD-10-CM

## 2023-01-05 DIAGNOSIS — M75.101 NONTRAUMATIC TEAR OF RIGHT SUPRASPINATUS TENDON: ICD-10-CM

## 2023-01-05 DIAGNOSIS — M19.011 OSTEOARTHRITIS OF GLENOHUMERAL JOINT, RIGHT: ICD-10-CM

## 2023-01-05 PROCEDURE — 20610 DRAIN/INJ JOINT/BURSA W/O US: CPT | Performed by: STUDENT IN AN ORGANIZED HEALTH CARE EDUCATION/TRAINING PROGRAM

## 2023-01-05 PROCEDURE — 99214 OFFICE O/P EST MOD 30 MIN: CPT | Performed by: STUDENT IN AN ORGANIZED HEALTH CARE EDUCATION/TRAINING PROGRAM

## 2023-01-05 RX ORDER — TRIAMCINOLONE ACETONIDE 40 MG/ML
80 INJECTION, SUSPENSION INTRA-ARTICULAR; INTRAMUSCULAR
Status: COMPLETED | OUTPATIENT
Start: 2023-01-05 | End: 2023-01-05

## 2023-01-05 RX ORDER — ROPIVACAINE HYDROCHLORIDE 5 MG/ML
4 INJECTION, SOLUTION EPIDURAL; INFILTRATION; PERINEURAL
Status: COMPLETED | OUTPATIENT
Start: 2023-01-05 | End: 2023-01-05

## 2023-01-05 RX ORDER — LIDOCAINE HYDROCHLORIDE 10 MG/ML
4 INJECTION, SOLUTION EPIDURAL; INFILTRATION; INTRACAUDAL; PERINEURAL
Status: COMPLETED | OUTPATIENT
Start: 2023-01-05 | End: 2023-01-05

## 2023-01-05 RX ADMIN — LIDOCAINE HYDROCHLORIDE 4 ML: 10 INJECTION, SOLUTION EPIDURAL; INFILTRATION; INTRACAUDAL; PERINEURAL at 09:12

## 2023-01-05 RX ADMIN — ROPIVACAINE HYDROCHLORIDE 4 ML: 5 INJECTION, SOLUTION EPIDURAL; INFILTRATION; PERINEURAL at 09:12

## 2023-01-05 RX ADMIN — TRIAMCINOLONE ACETONIDE 80 MG: 40 INJECTION, SUSPENSION INTRA-ARTICULAR; INTRAMUSCULAR at 09:12

## 2023-01-05 NOTE — PROGRESS NOTES
[facility] Follow Up Visit     Office Follow Up      Date: 01/05/2023   Patient Name: Justus Bishop  MRN: [id number]  YOB: 1950    Referring Physician: No ref. provider found     Chief Complaint:   Chief Complaint   Patient presents with   • Follow-up     7 week MRI f/u-- Nontraumatic incomplete tear of right rotator cuff        History of Present Illness: Justus Bishop is a 72 y.o. male who is here today for follow up on right shoulder pain.  He continues to localize pain to the anterior and lateral aspects of the shoulder.  Since the last visit, he has been in physical therapy.  His range of motion is improved.  He still has mild strength deficits.  However, his pain has not been controlled and this continues to contribute to his overall symptoms.  He denies any further injury.  He has been on the meloxicam.  He recent underwent MRI of his right shoulder and is here today to discuss those results.    Subjective   Review of Systems: Review of Systems   Constitutional: Negative for chills, fever, unexpected weight gain and unexpected weight loss.   HENT: Negative for congestion, postnasal drip and rhinorrhea.    Eyes: Negative for blurred vision.   Respiratory: Negative for shortness of breath.    Cardiovascular: Negative for leg swelling.   Gastrointestinal: Negative for abdominal pain, nausea and vomiting.   Genitourinary: Negative for difficulty urinating.   Musculoskeletal: Positive for arthralgias. Negative for gait problem, joint swelling and myalgias.   Skin: Negative for skin lesions and wound.   Neurological: Negative for dizziness, weakness, light-headedness and numbness.   Hematological: Does not bruise/bleed easily.   Psychiatric/Behavioral: Negative for depressed mood.        Medications:   Current Outpatient Medications:   •  albuterol sulfate  (90 Base) MCG/ACT inhaler, Inhale 2 puffs Every 4 (Four) Hours  As Needed for Wheezing., Disp: 1 g, Rfl: 0  •  atorvastatin (LIPITOR) 20 MG tablet, TAKE ONE TABLET BY MOUTH DAILY, Disp: 90 tablet, Rfl: 0  •  finasteride (PROSCAR) 5 MG tablet, Take 1 tablet by mouth Daily., Disp: 90 tablet, Rfl: 3  •  Fluticasone Furoate-Vilanterol (Breo Ellipta) 200-25 MCG/INH inhaler, Inhale 1 puff Daily., Disp: 1 each, Rfl: 2  •  hydroCHLOROthiazide (HYDRODIURIL) 25 MG tablet, TAKE ONE TABLET BY MOUTH DAILY, Disp: 30 tablet, Rfl: 2  •  meloxicam (MOBIC) 7.5 MG tablet, Take 1 tablet by mouth Daily With Breakfast., Disp: 30 tablet, Rfl: 0  •  mupirocin (BACTROBAN) 2 % ointment, APPLY TO AFFECTED AREA(S) TWO TIMES A DAY, Disp: 22 g, Rfl: 1  •  olmesartan (BENICAR) 40 MG tablet, TAKE ONE TABLET BY MOUTH DAILY, Disp: 30 tablet, Rfl: 2  •  tamsulosin (FLOMAX) 0.4 MG capsule 24 hr capsule, Take 1 capsule by mouth Daily., Disp: 90 capsule, Rfl: 3    Allergies: No Known Allergies    I have reviewed and updated the patient's chief complaint, history of present illness, review of systems, past medical history, surgical history, family history, social history, medications and allergy list as appropriate.     Objective    Vital Signs:   Vitals:    01/05/23 0829   BP: 144/70   Weight: 113 kg (250 lb)   Height: 177.8 cm (70\")     Body mass index is 35.87 kg/m².  Class 2 Severe Obesity (BMI >=35 and <=39.9). Obesity-related health conditions include the following: hypertension, coronary heart disease, diabetes mellitus and dyslipidemias. Obesity is improving with lifestyle modifications. BMI is is above average; BMI management plan is completed. We discussed portion control and increasing exercise.    Patient reports that he is a former smoker and quit in 1975.  He has not resumed smoking since that time.  This behavior was applauded and she was encouraged to continue in smoking cessation.  We will continue to monitor at subsequent visits.    Ortho Exam:  Constitutional: Well developed. Well  nourished.  Cardiac: well perfused, extremities pink.  Peripheral vascular: normal, pulses intact, sensation intact  Right shoulder: No erythema ecchymosis or swelling.  Tender to palpation of both the anterior and lateral aspects of the shoulder.  He is tender at the rotator cuff insertion as well as the glenohumeral and AC joints.  He has full range of motion in abduction and flexion of the shoulder 0 to 140 degrees but with pain after 110 degrees.  Tender laterally with resisted external rotation.  Positive Hernandez test.  Negative drop arm.  Positive crossarm test at the AC joint.  Positive speeds test. Negative anterior apprehension.  Sensation intact to light touch.  5/5 strength.    Results Review:   Imaging Results (Last 24 Hours)     ** No results found for the last 24 hours. **      MRI Shoulder Right Without Contrast (12/29/2022 07:14)  I personally reviewed the MRI of the right shoulder without contrast.  The results show partial-thickness tear of the supraspinatus and infraspinatus with a small area of full-thickness supraspinatus tear.  There is no retraction.  Degenerative changes are present in the glenohumeral and acromioclavicular joints.  Fluid present consistent with biceps tendinitis of the biceps tendon.    Procedures    Assessment / Plan    Assessment/Plan:   Diagnoses and all orders for this visit:    1. Rotator cuff tendinitis, right (Primary)    2. Nontraumatic tear of right supraspinatus tendon    3. Osteoarthritis of glenohumeral joint, right    4. Osteoarthritis of right acromioclavicular joint    5. Biceps tendinitis of right shoulder    Other orders  -     Large Joint Arthrocentesis: R subacromial bursa      Persistent anterior and lateral shoulder pain despite meloxicam and physical therapy.  Motion is somewhat improved but pain has not been controlled yet.  Discussed his MRI findings that show incomplete tears of the supraspinatus and infraspinatus with a small focal area of  full-thickness supraspinatus tear without retraction.  He also has biceps tendinitis as well as previously seen degenerative changes in the glenohumeral and acromioclavicular joints.  Taking all of his findings in the context, I believe that the best approach was promised to continue with nonoperative treatment.  We will take steps to reduce his overall pain so that he can continue with therapy in hopes to improve both his motion and strength.  He also has upcoming appointments with dermatology for an ongoing skin infection.  Today, he elected to undergo subacromial subdeltoid bursa corticosteroid injection.  He will continue with meloxicam and with her therapy.  I will see him back in 6 weeks to monitor his response.    Past laboratory results reviewed: 6/13/2022-hemoglobin A1c 5.80%. 7/21/22- Creatinine 1.05, EGFR 75.9, glucose 90.    Follow Up:   No follow-ups on file.      Roger Cook MD  [facility]

## 2023-01-05 NOTE — PROGRESS NOTES
Procedure   Large Joint Arthrocentesis: R subacromial bursa  Date/Time: 1/5/2023 9:12 AM  Consent given by: patient  Site marked: site marked  Timeout: Immediately prior to procedure a time out was called to verify the correct patient, procedure, equipment, support staff and site/side marked as required   Supporting Documentation  Indications: pain   Procedure Details  Location: shoulder - R subacromial bursa  Preparation: Patient was prepped and draped in the usual sterile fashion  Needle size: 23 G  Approach: posterior  Medications administered: 4 mL ropivacaine 0.5 %; 4 mL lidocaine PF 1% 1 %; 80 mg triamcinolone acetonide 40 MG/ML  Patient tolerance: patient tolerated the procedure well with no immediate complications

## 2023-01-19 ENCOUNTER — TREATMENT (OUTPATIENT)
Dept: PHYSICAL THERAPY | Facility: CLINIC | Age: 73
End: 2023-01-19
Payer: MEDICARE

## 2023-01-19 DIAGNOSIS — M67.911 DISORDER OF RIGHT ROTATOR CUFF: ICD-10-CM

## 2023-01-19 DIAGNOSIS — M25.511 CHRONIC RIGHT SHOULDER PAIN: Primary | ICD-10-CM

## 2023-01-19 DIAGNOSIS — G89.29 CHRONIC RIGHT SHOULDER PAIN: Primary | ICD-10-CM

## 2023-01-19 DIAGNOSIS — M75.41 IMPINGEMENT SYNDROME OF RIGHT SHOULDER: ICD-10-CM

## 2023-01-19 PROCEDURE — 97110 THERAPEUTIC EXERCISES: CPT | Performed by: PHYSICAL THERAPIST

## 2023-01-19 PROCEDURE — 97530 THERAPEUTIC ACTIVITIES: CPT | Performed by: PHYSICAL THERAPIST

## 2023-01-19 PROCEDURE — 97140 MANUAL THERAPY 1/> REGIONS: CPT | Performed by: PHYSICAL THERAPIST

## 2023-01-19 NOTE — PROGRESS NOTES
Physical Therapy Progress Note/Daily Treatment Note  [facility] [place] [facility] [place]  [address], [address] [address]      Patient: Justus Bishop   : 1950  Diagnosis/ICD-10 Code:  Chronic right shoulder pain [M25.511, G89.29]  Referring practitioner: Misty Mai DO  Date of Initial Visit: Type: THERAPY  Noted: 2022  Today's Date: 2023  Patient seen for 4 sessions         Visit Diagnoses:    ICD-10-CM ICD-9-CM   1. Chronic right shoulder pain  M25.511 719.41    G89.29 338.29   2. Impingement syndrome of right shoulder  M75.41 726.2   3. Disorder of right rotator cuff  M67.911 726.10       Subjective Evaluation    Pain  Current pain ratin  At best pain ratin  At worst pain ratin         Justus Bishop reports: had injection into the right shoulder, late December, it was very helpful.  40% improvement in symptoms at pain rest and is sleeping better.      Objective          Static Posture     Head  Forward.    Shoulders  Rounded.    Scapulae  Left protracted and right protracted.    Thoracic Spine  Hyperkyphosis.    Tenderness     Right Shoulder  Tenderness in the acromion, infraspinatus tendon, subacromial bursa and supraspinatus tendon.     Active Range of Motion     Right Shoulder   Flexion: 146 degrees with pain  Extension: 44 degrees   Abduction: 108 degrees with pain  External rotation 90°: 64 degreeswith pain  Internal rotation 90°: 41 degrees with pain    Joint Play     Right Shoulder  Hypomobile in the anterior capsule, posterior capsule and inferior capsule.     Strength/Myotome Testing     Right Shoulder     Planes of Motion   Flexion: 4+   Extension: 5   Abduction: 4+   External rotation at 0°: 4   Internal rotation at 0°: 5     Tests     Right Shoulder   Positive empty can, Hawkin's and Neer's.   Negative sulcus sign.         See Exercise, Manual, and Modality Logs for complete treatment.       Assessment & Plan     Assessment  Impairments:  abnormal or restricted ROM, activity intolerance and pain with function  Other impairment: QUICK DASH: 27.27 %, Was about 34%  Functional Limitations: lifting, pulling, pushing, uncomfortable because of pain and reaching overhead  Assessment details: 72 year old right male, last here on 12/14/2022, has since received injection to the right GH joint injection which has significantly reduced resting and night pain.  But he still pain with overhead activities, lifting, reaching. Weakness present in the right shoulder girdle.  MRI indicates several aging pathologies, has some RTC pathologies as well, but most likely not a surgical candidate at this time. Should continue to improve with PT.   Prognosis: fair    Goals  Plan Goals: GOAL PROGRESS  STG to be met in 4 weeks  1. Increase right  shoulder flexion  ROM to 160 degrees with tolerable discomfort to be able to place objects on shelves over head.  NOT MET  2. Increase right shoulder internal  rotation ROM to 50 degrees to assist with less painful dressing.  NOT MET  3. Pt reports decrease in worst pain level to 5/10, so that the pt can perform more activities at home and work.  MET  4. Pt has improved functional activities so that Quick Dash score improves to 26%. NOT MET  5. Pt rigth scapular strength is 5/5 so that there is reduction in impingement motions.  NOT MET  LTG to be met in 12 weeks  1. Pt is independent with each phase of HEP.  NOT MET  2. Pt is able sleep through night without being awaken by shoulder pain.  MET  3. Pt can bear full weight through right UE without shoulder pain so that they can crawl and push objects as needs.  NOT MET  4.  Pt has full right shoulder AROM to be able to do all need activities at home and work.  NOT MET  5.  Pt shoulder girdle strength is 5/5 throughout to perform all lifting activities as needed.  NOT MET      Plan  Therapy options: will be seen for skilled therapy services  Planned modality interventions: ultrasound and  high voltage pulsed current (pain management)  Planned therapy interventions: body mechanics training, manual therapy, neuromuscular re-education, postural training, soft tissue mobilization, spinal/joint mobilization, strengthening, stretching, therapeutic activities, joint mobilization, home exercise program, gait training and functional ROM exercises  Frequency: 1x week  Duration in weeks: 8                 Timed:         Manual Therapy:    23     mins  53555;     Therapeutic Exercise:    20     mins  09887;     Neuromuscular Tammi:        mins  38994;    Therapeutic Activity:     18     mins  18113;     Gait Training:           mins  69914;     Ultrasound:          mins  26281;    Ionto                                   mins   66802  Self Care                            mins   61067  Canalith Repos         mins 26755      Un-Timed:  Electrical Stimulation:         mins  84003 (MC );  Dry Needling          mins self-pay  Traction          mins 64889      Timed Treatment:   61   mins   Total Treatment:     61   mins    True Andrew, PT  [place] License: [id number]

## 2023-01-26 ENCOUNTER — TREATMENT (OUTPATIENT)
Dept: PHYSICAL THERAPY | Facility: CLINIC | Age: 73
End: 2023-01-26
Payer: MEDICARE

## 2023-01-26 DIAGNOSIS — G89.29 CHRONIC RIGHT SHOULDER PAIN: Primary | ICD-10-CM

## 2023-01-26 DIAGNOSIS — M25.511 CHRONIC RIGHT SHOULDER PAIN: Primary | ICD-10-CM

## 2023-01-26 DIAGNOSIS — M67.911 DISORDER OF RIGHT ROTATOR CUFF: ICD-10-CM

## 2023-01-26 DIAGNOSIS — M75.41 IMPINGEMENT SYNDROME OF RIGHT SHOULDER: ICD-10-CM

## 2023-01-26 PROCEDURE — 97110 THERAPEUTIC EXERCISES: CPT | Performed by: PHYSICAL THERAPIST

## 2023-01-26 PROCEDURE — 97140 MANUAL THERAPY 1/> REGIONS: CPT | Performed by: PHYSICAL THERAPIST

## 2023-01-26 PROCEDURE — 97530 THERAPEUTIC ACTIVITIES: CPT | Performed by: PHYSICAL THERAPIST

## 2023-01-26 NOTE — PROGRESS NOTES
Physical Therapy Daily Treatment Note    [address] [facility] [address] [facility], [facility] [address]                                      [facility] [address]      Patient: Justus Bishop   : 1950  Diagnosis/ICD-10 Code:  Chronic right shoulder pain [M25.511, G89.29]  Referring practitioner: Misty Mai DO  Date of Initial Visit: Type: THERAPY  Noted: 2022  Today's Date: 2023  Patient seen for 5 sessions             Subjective   Justus Bishop reports: have not been as good with completing the exercises the past couple weeks.     Objective          Active Range of Motion     Right Shoulder   Flexion: 153 (ERP) degrees   Abduction: 151 degrees       See Exercise, Manual, and Modality Logs for complete treatment.       Assessment/Plan  Significant gains in active abduction this visit. Gave flexion wall stretch for HEP.  Progress per Plan of Care and Progress strengthening /stabilization /functional activity           Timed:  Manual Therapy:    18     mins  89816;  Therapeutic Exercise:    17     mins  40415;     Neuromuscular Tammi:        mins  36237;    Therapeutic Activity:     13     mins  50706;     Gait Training:           mins  16870;     Ultrasound:          mins  46361;    Electrical Stimulation:         mins  08823;  Iontophoresis          mins  79273    Untimed:  Electrical Stimulation:         mins  22456 ( );  Mechanical Traction:         mins  44486;   Fluidotherapy          mins  28795    Timed Treatment:   48   mins   Total Treatment:     48   mins        Emilee Shabazz PTA  Physical Therapist Assistant

## 2023-01-31 RX ORDER — HYDROCHLOROTHIAZIDE 25 MG/1
TABLET ORAL
Qty: 30 TABLET | Refills: 2 | Status: SHIPPED | OUTPATIENT
Start: 2023-01-31

## 2023-02-02 ENCOUNTER — TELEPHONE (OUTPATIENT)
Dept: PHYSICAL THERAPY | Facility: CLINIC | Age: 73
End: 2023-02-02

## 2023-02-08 ENCOUNTER — LAB (OUTPATIENT)
Dept: LAB | Facility: HOSPITAL | Age: 73
End: 2023-02-08
Payer: MEDICARE

## 2023-02-08 ENCOUNTER — OFFICE VISIT (OUTPATIENT)
Dept: FAMILY MEDICINE CLINIC | Facility: CLINIC | Age: 73
End: 2023-02-08
Payer: MEDICARE

## 2023-02-08 VITALS
DIASTOLIC BLOOD PRESSURE: 69 MMHG | OXYGEN SATURATION: 96 % | HEART RATE: 102 BPM | SYSTOLIC BLOOD PRESSURE: 123 MMHG | TEMPERATURE: 97.2 F | RESPIRATION RATE: 22 BRPM | WEIGHT: 264 LBS | BODY MASS INDEX: 37.8 KG/M2 | HEIGHT: 70 IN

## 2023-02-08 DIAGNOSIS — I10 ESSENTIAL HYPERTENSION: ICD-10-CM

## 2023-02-08 DIAGNOSIS — R73.03 PREDIABETES: Primary | ICD-10-CM

## 2023-02-08 DIAGNOSIS — E78.5 HYPERLIPIDEMIA LDL GOAL <100: ICD-10-CM

## 2023-02-08 DIAGNOSIS — R73.03 PREDIABETES: ICD-10-CM

## 2023-02-08 DIAGNOSIS — D72.829 LEUKOCYTOSIS, UNSPECIFIED TYPE: ICD-10-CM

## 2023-02-08 LAB
ALBUMIN SERPL-MCNC: 4.1 G/DL (ref 3.5–5.2)
ALBUMIN/GLOB SERPL: 1.3 G/DL
ALP SERPL-CCNC: 66 U/L (ref 39–117)
ALT SERPL W P-5'-P-CCNC: 28 U/L (ref 1–41)
ANION GAP SERPL CALCULATED.3IONS-SCNC: 11.5 MMOL/L (ref 5–15)
AST SERPL-CCNC: 14 U/L (ref 1–40)
BASOPHILS # BLD AUTO: 0.08 10*3/MM3 (ref 0–0.2)
BASOPHILS NFR BLD AUTO: 0.6 % (ref 0–1.5)
BILIRUB SERPL-MCNC: 0.5 MG/DL (ref 0–1.2)
BUN SERPL-MCNC: 15 MG/DL (ref 8–23)
BUN/CREAT SERPL: 13.5 (ref 7–25)
CALCIUM SPEC-SCNC: 9.5 MG/DL (ref 8.6–10.5)
CHLORIDE SERPL-SCNC: 99 MMOL/L (ref 98–107)
CHOLEST SERPL-MCNC: 145 MG/DL (ref 0–200)
CO2 SERPL-SCNC: 26.5 MMOL/L (ref 22–29)
CREAT SERPL-MCNC: 1.11 MG/DL (ref 0.76–1.27)
DEPRECATED RDW RBC AUTO: 47.9 FL (ref 37–54)
EGFRCR SERPLBLD CKD-EPI 2021: 70.6 ML/MIN/1.73
EOSINOPHIL # BLD AUTO: 0.09 10*3/MM3 (ref 0–0.4)
EOSINOPHIL NFR BLD AUTO: 0.6 % (ref 0.3–6.2)
ERYTHROCYTE [DISTWIDTH] IN BLOOD BY AUTOMATED COUNT: 14.3 % (ref 12.3–15.4)
GLOBULIN UR ELPH-MCNC: 3.1 GM/DL
GLUCOSE SERPL-MCNC: 122 MG/DL (ref 65–99)
HBA1C MFR BLD: 6.9 % (ref 4.8–5.6)
HCT VFR BLD AUTO: 52.2 % (ref 37.5–51)
HDLC SERPL-MCNC: 44 MG/DL (ref 40–60)
HGB BLD-MCNC: 17.9 G/DL (ref 13–17.7)
IMM GRANULOCYTES # BLD AUTO: 0.06 10*3/MM3 (ref 0–0.05)
IMM GRANULOCYTES NFR BLD AUTO: 0.4 % (ref 0–0.5)
LDLC SERPL CALC-MCNC: 70 MG/DL (ref 0–100)
LDLC/HDLC SERPL: 1.47 {RATIO}
LYMPHOCYTES # BLD AUTO: 3.49 10*3/MM3 (ref 0.7–3.1)
LYMPHOCYTES NFR BLD AUTO: 24.8 % (ref 19.6–45.3)
MCH RBC QN AUTO: 31.6 PG (ref 26.6–33)
MCHC RBC AUTO-ENTMCNC: 34.3 G/DL (ref 31.5–35.7)
MCV RBC AUTO: 92.2 FL (ref 79–97)
MONOCYTES # BLD AUTO: 1.29 10*3/MM3 (ref 0.1–0.9)
MONOCYTES NFR BLD AUTO: 9.1 % (ref 5–12)
NEUTROPHILS NFR BLD AUTO: 64.5 % (ref 42.7–76)
NEUTROPHILS NFR BLD AUTO: 9.09 10*3/MM3 (ref 1.7–7)
NRBC BLD AUTO-RTO: 0 /100 WBC (ref 0–0.2)
PLATELET # BLD AUTO: 346 10*3/MM3 (ref 140–450)
PMV BLD AUTO: 8.8 FL (ref 6–12)
POTASSIUM SERPL-SCNC: 3.4 MMOL/L (ref 3.5–5.2)
PROT SERPL-MCNC: 7.2 G/DL (ref 6–8.5)
RBC # BLD AUTO: 5.66 10*6/MM3 (ref 4.14–5.8)
SODIUM SERPL-SCNC: 137 MMOL/L (ref 136–145)
TRIGL SERPL-MCNC: 182 MG/DL (ref 0–150)
VLDLC SERPL-MCNC: 31 MG/DL (ref 5–40)
WBC NRBC COR # BLD: 14.1 10*3/MM3 (ref 3.4–10.8)

## 2023-02-08 PROCEDURE — 99214 OFFICE O/P EST MOD 30 MIN: CPT | Performed by: STUDENT IN AN ORGANIZED HEALTH CARE EDUCATION/TRAINING PROGRAM

## 2023-02-08 PROCEDURE — 90750 HZV VACC RECOMBINANT IM: CPT | Performed by: STUDENT IN AN ORGANIZED HEALTH CARE EDUCATION/TRAINING PROGRAM

## 2023-02-08 PROCEDURE — 36415 COLL VENOUS BLD VENIPUNCTURE: CPT

## 2023-02-08 PROCEDURE — 85025 COMPLETE CBC W/AUTO DIFF WBC: CPT

## 2023-02-08 PROCEDURE — 90471 IMMUNIZATION ADMIN: CPT | Performed by: STUDENT IN AN ORGANIZED HEALTH CARE EDUCATION/TRAINING PROGRAM

## 2023-02-08 PROCEDURE — 80053 COMPREHEN METABOLIC PANEL: CPT

## 2023-02-08 PROCEDURE — 80061 LIPID PANEL: CPT

## 2023-02-08 PROCEDURE — 83036 HEMOGLOBIN GLYCOSYLATED A1C: CPT

## 2023-02-08 NOTE — PROGRESS NOTES
"Chief Complaint  Follow up      History of Present Illness       htn  Taking hctz and olmesartan      hld  Taking statin    He says his facial rash has improved and is healing. He would like a refill of mupirocin as he feels this helps as well. He has been referred to derm and has follow up.     The following portions of the patient's history were reviewed and updated as appropriate: allergies, current medications, past family history, past medical history, past social history, past surgical history, and problem list.    OBJECTIVE:  /69   Pulse 102   Temp 97.2 °F (36.2 °C)   Resp 22   Ht 177.8 cm (70\")   Wt 120 kg (264 lb)   SpO2 96%   BMI 37.88 kg/m²       Physical Exam  Constitutional:       General: He is not in acute distress.     Appearance: Normal appearance.   HENT:      Head: Normocephalic and atraumatic.   Eyes:      Extraocular Movements: Extraocular movements intact.   Cardiovascular:      Rate and Rhythm: Normal rate and regular rhythm.      Heart sounds: No murmur heard.  Pulmonary:      Effort: Pulmonary effort is normal. No respiratory distress.      Breath sounds: Normal breath sounds. No stridor. No wheezing, rhonchi or rales.   Skin:     Findings: Rash present.   Neurological:      General: No focal deficit present.      Mental Status: He is alert.   Psychiatric:         Mood and Affect: Mood normal.                    Assessment and Plan   Diagnoses and all orders for this visit:    1. Prediabetes (Primary)  -     Hemoglobin A1c; Future    2. Essential hypertension  -     Comprehensive metabolic panel; Future    3. Hyperlipidemia LDL goal <100  -     Lipid panel; Future  -     Comprehensive metabolic panel; Future    Other orders  -     mupirocin (BACTROBAN) 2 % ointment; APPLY TO AFFECTED AREA(S) TWO TIMES A DAY  Dispense: 22 g; Refill: 1  -     Shingrix Vaccine      Advised him to follow up with derm rather than just taking mupirocin for skin rash. It does appear improved on exam. " He says his follow up is on the 13th. Continue to avoid picking.     Return in about 6 months (around 8/8/2023) for Medicare Wellness.       Misty Mai D.O.  [facility] [place]

## 2023-02-09 ENCOUNTER — TELEPHONE (OUTPATIENT)
Dept: FAMILY MEDICINE CLINIC | Facility: CLINIC | Age: 73
End: 2023-02-09
Payer: MEDICARE

## 2023-02-09 RX ORDER — POTASSIUM CHLORIDE 750 MG/1
20 TABLET, FILM COATED, EXTENDED RELEASE ORAL ONCE
Qty: 2 TABLET | Refills: 0 | Status: SHIPPED | OUTPATIENT
Start: 2023-02-09 | End: 2023-02-09

## 2023-02-09 NOTE — TELEPHONE ENCOUNTER
HUB OKAY TO READ!!    ----- Message from Misty Mai DO sent at 2/9/2023 10:02 AM EST -----  Please let him know his diabetic test is now in the diabetic range. Recommend yearly eye exams. No medication at this time as this can be treated with diet and exercise.  Recommend diet with lean meat fish and vegetables and decreased sugar/carbs with daily exercise. Can refer to phone dietitian if this helps let me know. Recommend to recheck this in 3 months.   Cholesterol is very improved.   His potassium is mildly low. Recommend for him to stop by pharmacy and  the one time prescription I have called in for him. Be sure to take potassium over the counter daily. (99 mg once daily).   Will recheck this at next visit.

## 2023-02-14 ENCOUNTER — TREATMENT (OUTPATIENT)
Dept: PHYSICAL THERAPY | Facility: CLINIC | Age: 73
End: 2023-02-14
Payer: MEDICARE

## 2023-02-14 DIAGNOSIS — M25.511 CHRONIC RIGHT SHOULDER PAIN: Primary | ICD-10-CM

## 2023-02-14 DIAGNOSIS — M67.911 DISORDER OF RIGHT ROTATOR CUFF: ICD-10-CM

## 2023-02-14 DIAGNOSIS — M75.41 IMPINGEMENT SYNDROME OF RIGHT SHOULDER: ICD-10-CM

## 2023-02-14 DIAGNOSIS — G89.29 CHRONIC RIGHT SHOULDER PAIN: Primary | ICD-10-CM

## 2023-02-14 PROCEDURE — 97110 THERAPEUTIC EXERCISES: CPT | Performed by: PHYSICAL THERAPIST

## 2023-02-14 PROCEDURE — 97530 THERAPEUTIC ACTIVITIES: CPT | Performed by: PHYSICAL THERAPIST

## 2023-02-14 NOTE — PROGRESS NOTES
Physical Therapy Daily Treatment Note  [facility] [place]  [address] [facility]  [facility] [address]      Patient: Justus Bishop   : 1950  Diagnosis/ICD-10 Code:  Chronic right shoulder pain [M25.511, G89.29]  Referring practitioner: Misty Mai DO  Date of Initial Visit: Type: THERAPY  Noted: 2022  Today's Date: 2023  Patient seen for 6 sessions         Visit Diagnoses:    ICD-10-CM ICD-9-CM   1. Chronic right shoulder pain  M25.511 719.41    G89.29 338.29   2. Impingement syndrome of right shoulder  M75.41 726.2   3. Disorder of right rotator cuff  M67.911 726.10       Subjective   Justus Bishop reports: To see Ortho MD Thursday. The shoulder injection has helped. Shoulder still hurts but not as bad and he feels he is moving better.    Objective   RIGHT SHOULDER ROM: Flexion 152, Abduction 135, External Rotation 70, Internal Rotation 65.  Capsular End feel in direction, with end range capsular pain.  STRENGTH: Abduction 4+/5, Flexion 5/5, IR 5/5, ER 4/5 improving.    See Exercise, Manual, and Modality Logs for complete treatment.       Assessment/Plan  Chronic right shoulder pain with signs of adhesive capsulitis improving.  Some weakness in the RTC but this too seems to be improving.  Suggest we continue rehab.  Progress per Plan of Care, Progress strengthening /stabilization /functional activity and Awaiting MD orders           Timed:         Manual Therapy:    10     mins  60234;     Therapeutic Exercise:    26     mins  26140;     Neuromuscular Tammi:        mins  92006;    Therapeutic Activity:     11     mins  68626;     Gait Training:           mins  63119;     Ultrasound:          mins  04808;    Ionto                                   mins   00281  Self Care                            mins   81110  Canalith Repos         mins 83307      Un-Timed:  Electrical Stimulation:         mins  97017 ( );  Dry Needling          mins self-pay  Traction           mins 26947      Timed Treatment:   47   mins   Total Treatment:     47   mins    True Andrew PT, CHT  [place] License: [id number]

## 2023-02-16 ENCOUNTER — OFFICE VISIT (OUTPATIENT)
Dept: ORTHOPEDIC SURGERY | Facility: CLINIC | Age: 73
End: 2023-02-16
Payer: MEDICARE

## 2023-02-16 VITALS
HEIGHT: 70 IN | DIASTOLIC BLOOD PRESSURE: 90 MMHG | BODY MASS INDEX: 37.87 KG/M2 | SYSTOLIC BLOOD PRESSURE: 140 MMHG | WEIGHT: 264.55 LBS

## 2023-02-16 DIAGNOSIS — M75.101 NONTRAUMATIC TEAR OF RIGHT SUPRASPINATUS TENDON: Primary | ICD-10-CM

## 2023-02-16 DIAGNOSIS — M75.21 BICEPS TENDINITIS OF RIGHT SHOULDER: ICD-10-CM

## 2023-02-16 PROCEDURE — 99213 OFFICE O/P EST LOW 20 MIN: CPT | Performed by: STUDENT IN AN ORGANIZED HEALTH CARE EDUCATION/TRAINING PROGRAM

## 2023-02-16 NOTE — PROGRESS NOTES
[facility] Follow Up Visit     Office Follow Up      Date: 02/16/2023   Patient Name: Justus Bishop  MRN: [id number]  YOB: 1950    Referring Physician: No ref. provider found     Chief Complaint:   Chief Complaint   Patient presents with   • Follow-up     6 week follow up; Rotator cuff tendinitis, right        History of Present Illness: Justus Bishop is a 72 y.o. male who is here today for follow up on right shoulder pain from supraspinatus tear.  At last visit, he received subacromial corticosteroid injection.  He is continued on meloxicam and physical therapy.  Reports interval improvement in his symptoms.  His pain is down to a 3/10.  He has good motion.  States that overall he is doing well.    Subjective   Review of Systems: Review of Systems   Constitutional: Negative for chills, fever, unexpected weight gain and unexpected weight loss.   HENT: Negative for congestion, postnasal drip and rhinorrhea.    Eyes: Negative for blurred vision.   Respiratory: Negative for shortness of breath.    Cardiovascular: Negative for leg swelling.   Gastrointestinal: Negative for abdominal pain, nausea and vomiting.   Genitourinary: Negative for difficulty urinating.   Musculoskeletal: Positive for arthralgias. Negative for gait problem, joint swelling and myalgias.   Skin: Negative for skin lesions and wound.   Neurological: Negative for dizziness, weakness, light-headedness and numbness.   Hematological: Does not bruise/bleed easily.   Psychiatric/Behavioral: Negative for depressed mood.        Medications:   Current Outpatient Medications:   •  albuterol sulfate  (90 Base) MCG/ACT inhaler, Inhale 2 puffs Every 4 (Four) Hours As Needed for Wheezing., Disp: 1 g, Rfl: 0  •  atorvastatin (LIPITOR) 20 MG tablet, TAKE ONE TABLET BY MOUTH DAILY, Disp: 90 tablet, Rfl: 0  •  finasteride (PROSCAR) 5 MG tablet, Take 1 tablet by mouth  "Daily., Disp: 90 tablet, Rfl: 3  •  Fluticasone Furoate-Vilanterol (Breo Ellipta) 200-25 MCG/INH inhaler, Inhale 1 puff Daily., Disp: 1 each, Rfl: 2  •  hydroCHLOROthiazide (HYDRODIURIL) 25 MG tablet, TAKE ONE TABLET BY MOUTH DAILY, Disp: 30 tablet, Rfl: 2  •  meloxicam (MOBIC) 7.5 MG tablet, Take 1 tablet by mouth Daily With Breakfast., Disp: 30 tablet, Rfl: 0  •  mupirocin (BACTROBAN) 2 % ointment, APPLY TO AFFECTED AREA(S) TWO TIMES A DAY, Disp: 22 g, Rfl: 1  •  olmesartan (BENICAR) 40 MG tablet, TAKE ONE TABLET BY MOUTH DAILY, Disp: 30 tablet, Rfl: 2  •  tamsulosin (FLOMAX) 0.4 MG capsule 24 hr capsule, Take 1 capsule by mouth Daily., Disp: 90 capsule, Rfl: 3    Allergies: No Known Allergies    I have reviewed and updated the patient's chief complaint, history of present illness, review of systems, past medical history, surgical history, family history, social history, medications and allergy list as appropriate.     Objective    Vital Signs:   Vitals:    02/16/23 0834   BP: 140/90   Weight: 120 kg (264 lb 8.8 oz)   Height: 177.8 cm (70\")     Body mass index is 37.96 kg/m².  Class 2 Severe Obesity (BMI >=35 and <=39.9). Obesity-related health conditions include the following: hypertension, coronary heart disease, diabetes mellitus and dyslipidemias. Obesity is unchanged. BMI is is above average; BMI management plan is completed. We discussed portion control and increasing exercise.    Patient reports that he is a former smoker and quit in 1975.  He has not resumed smoking since that time.  This behavior was applauded and she was encouraged to continue in smoking cessation.  We will continue to monitor at subsequent visits.     Ortho Exam:  Constitutional: Well developed. Well nourished.  Cardiac: well perfused, extremities pink.  Peripheral vascular: normal, pulses intact, sensation intact  Right shoulder: No erythema ecchymosis or swelling.  Tender to palpation of both the anterior and lateral aspects of the " shoulder.  He is tender at the rotator cuff insertion as well as the glenohumeral and AC joints.  He has full range of motion in abduction and flexion of the shoulder 0 to 140 degrees.  Tender laterally with resisted external rotation. Worst pain with internal rotation.  Positive Hernandez test.  Negative drop arm.  Positive crossarm test at the AC joint.  Positive speeds test. Negative anterior apprehension.  Sensation intact to light touch.  5/5 strength.    Results Review:   Imaging Results (Last 24 Hours)     ** No results found for the last 24 hours. **        Procedures    Assessment / Plan    Assessment/Plan:   Diagnoses and all orders for this visit:    1. Nontraumatic tear of right supraspinatus tendon (Primary)    2. Biceps tendinitis of right shoulder      Interval improvement in anterior and lateral shoulder pain.  His range of motion is greatly improved.  Continues to have issue with internal rotation but is pleased with his progress with physical therapy.  I explained how internal rotation impinges that supraspinatus tear.  No significant pain from the biceps tendon today.  Takes the meloxicam only as needed.  Given his improvements, we will have him continue with therapy.  I discussed that corticosteroid injections can be repeated but only as needed.  He would like to be seen for his left hip pain and  when we see him at that time, we can discuss shoulder further as well.    Follow Up:   No follow-ups on file.      Roger Cook MD  [facility]

## 2023-02-22 ENCOUNTER — OFFICE VISIT (OUTPATIENT)
Dept: ORTHOPEDIC SURGERY | Facility: CLINIC | Age: 73
End: 2023-02-22
Payer: MEDICARE

## 2023-02-22 ENCOUNTER — OFFICE VISIT (OUTPATIENT)
Dept: UROLOGY | Facility: CLINIC | Age: 73
End: 2023-02-22
Payer: MEDICARE

## 2023-02-22 VITALS
HEIGHT: 70 IN | DIASTOLIC BLOOD PRESSURE: 74 MMHG | SYSTOLIC BLOOD PRESSURE: 148 MMHG | BODY MASS INDEX: 37.8 KG/M2 | WEIGHT: 264 LBS

## 2023-02-22 VITALS
HEIGHT: 70 IN | OXYGEN SATURATION: 97 % | SYSTOLIC BLOOD PRESSURE: 124 MMHG | HEART RATE: 58 BPM | BODY MASS INDEX: 37.8 KG/M2 | WEIGHT: 264 LBS | DIASTOLIC BLOOD PRESSURE: 68 MMHG

## 2023-02-22 DIAGNOSIS — R31.0 GROSS HEMATURIA: ICD-10-CM

## 2023-02-22 DIAGNOSIS — E11.65 TYPE 2 DIABETES MELLITUS WITH HYPERGLYCEMIA, WITHOUT LONG-TERM CURRENT USE OF INSULIN: ICD-10-CM

## 2023-02-22 DIAGNOSIS — M25.552 LEFT HIP PAIN: Primary | ICD-10-CM

## 2023-02-22 DIAGNOSIS — M16.12 PRIMARY OSTEOARTHRITIS OF LEFT HIP: ICD-10-CM

## 2023-02-22 DIAGNOSIS — N40.1 BPH WITH OBSTRUCTION/LOWER URINARY TRACT SYMPTOMS: ICD-10-CM

## 2023-02-22 DIAGNOSIS — N13.8 BPH WITH OBSTRUCTION/LOWER URINARY TRACT SYMPTOMS: ICD-10-CM

## 2023-02-22 PROCEDURE — 99214 OFFICE O/P EST MOD 30 MIN: CPT | Performed by: STUDENT IN AN ORGANIZED HEALTH CARE EDUCATION/TRAINING PROGRAM

## 2023-02-22 PROCEDURE — 51798 US URINE CAPACITY MEASURE: CPT | Performed by: STUDENT IN AN ORGANIZED HEALTH CARE EDUCATION/TRAINING PROGRAM

## 2023-02-22 PROCEDURE — 20611 DRAIN/INJ JOINT/BURSA W/US: CPT | Performed by: STUDENT IN AN ORGANIZED HEALTH CARE EDUCATION/TRAINING PROGRAM

## 2023-02-22 PROCEDURE — 99213 OFFICE O/P EST LOW 20 MIN: CPT | Performed by: STUDENT IN AN ORGANIZED HEALTH CARE EDUCATION/TRAINING PROGRAM

## 2023-02-22 RX ORDER — TAMSULOSIN HYDROCHLORIDE 0.4 MG/1
1 CAPSULE ORAL DAILY
Qty: 90 CAPSULE | Refills: 3 | Status: SHIPPED | OUTPATIENT
Start: 2023-02-22

## 2023-02-22 RX ORDER — FINASTERIDE 5 MG/1
5 TABLET, FILM COATED ORAL DAILY
Qty: 90 TABLET | Refills: 3 | Status: SHIPPED | OUTPATIENT
Start: 2023-02-22

## 2023-02-22 RX ORDER — ROPIVACAINE HYDROCHLORIDE 5 MG/ML
2 INJECTION, SOLUTION EPIDURAL; INFILTRATION; PERINEURAL
Status: COMPLETED | OUTPATIENT
Start: 2023-02-22 | End: 2023-02-22

## 2023-02-22 RX ORDER — LIDOCAINE HYDROCHLORIDE 10 MG/ML
5 INJECTION, SOLUTION EPIDURAL; INFILTRATION; INTRACAUDAL; PERINEURAL
Status: COMPLETED | OUTPATIENT
Start: 2023-02-22 | End: 2023-02-22

## 2023-02-22 RX ORDER — TRIAMCINOLONE ACETONIDE 40 MG/ML
80 INJECTION, SUSPENSION INTRA-ARTICULAR; INTRAMUSCULAR
Status: COMPLETED | OUTPATIENT
Start: 2023-02-22 | End: 2023-02-22

## 2023-02-22 RX ADMIN — TRIAMCINOLONE ACETONIDE 80 MG: 40 INJECTION, SUSPENSION INTRA-ARTICULAR; INTRAMUSCULAR at 14:39

## 2023-02-22 RX ADMIN — ROPIVACAINE HYDROCHLORIDE 2 ML: 5 INJECTION, SOLUTION EPIDURAL; INFILTRATION; PERINEURAL at 14:39

## 2023-02-22 RX ADMIN — LIDOCAINE HYDROCHLORIDE 5 ML: 10 INJECTION, SOLUTION EPIDURAL; INFILTRATION; INTRACAUDAL; PERINEURAL at 14:39

## 2023-02-22 NOTE — PROGRESS NOTES
"     Follow Up Office Visit      Patient Name: Justus Bishop  : 1950   MRN: [id number]     Chief Complaint:    Chief Complaint   Patient presents with   • Gross hematuria       Referring Provider: No ref. provider found    History of Present Illness: Justus Bishop is a 72 y.o. male who presents today for follow up of extreme BPH.      Patient was last seen in August.  Patient is managing his extreme BPH with tamsulosin and finasteride.  IPSS score remains moderate with mixed quality of life.  He is found to have 180+ gram prostate on CT scan.  His PSA trend is below.  PSA density is nonconcerning.  We have previously discussed robotic simple prostatectomy as a size independent procedure to relieve his prostatic obstruction.  At last visit patient was motivated to lose weight and \"get healthy\" and elected to defer surgical intervention despite his symptoms.    Lab Results   Component Value Date    PSA 4.440 (H) 2022     Today in follow-up the patient reports some weak stream, urgency, frequency and sensation of incomplete bladder emptying.  His PVR is 211 mL.    He denies gross hematuria or urinary tract infection symptoms.     Urinalysis nonconcerning today.     This patient has also completed a gross hematuria work-up in August with cystoscopy and CT urogram which was overall benign but his cystoscopy demonstrated significantly obstructing prostate with lateral lobe hyperplasia and coaptation and a large intravesical median lobe.             Subjective      Review of System: Review of Systems   Constitutional: Negative.    HENT: Negative.    Eyes: Negative.    Respiratory: Negative.    Cardiovascular: Negative.    Gastrointestinal: Negative.    Endocrine: Negative.    Genitourinary: Negative.    Musculoskeletal: Negative.    Skin: Negative.    Allergic/Immunologic: Negative.    Neurological: Negative.    Hematological: Negative.    Psychiatric/Behavioral: Negative.       I have reviewed " the ROS documented by my clinical staff, I have updated appropriately and I agree. Rob Escobar MD    I have reviewed and the following portions of the patient's history were updated as appropriate: past family history, past medical history, past social history, past surgical history and problem list.    Medications:     Current Outpatient Medications:   •  albuterol sulfate  (90 Base) MCG/ACT inhaler, Inhale 2 puffs Every 4 (Four) Hours As Needed for Wheezing., Disp: 1 g, Rfl: 0  •  atorvastatin (LIPITOR) 20 MG tablet, TAKE ONE TABLET BY MOUTH DAILY, Disp: 90 tablet, Rfl: 0  •  finasteride (PROSCAR) 5 MG tablet, Take 1 tablet by mouth Daily., Disp: 90 tablet, Rfl: 3  •  Fluticasone Furoate-Vilanterol (Breo Ellipta) 200-25 MCG/INH inhaler, Inhale 1 puff Daily., Disp: 1 each, Rfl: 2  •  hydroCHLOROthiazide (HYDRODIURIL) 25 MG tablet, TAKE ONE TABLET BY MOUTH DAILY, Disp: 30 tablet, Rfl: 2  •  meloxicam (MOBIC) 7.5 MG tablet, Take 1 tablet by mouth Daily With Breakfast., Disp: 30 tablet, Rfl: 0  •  mupirocin (BACTROBAN) 2 % ointment, APPLY TO AFFECTED AREA(S) TWO TIMES A DAY, Disp: 22 g, Rfl: 1  •  olmesartan (BENICAR) 40 MG tablet, TAKE ONE TABLET BY MOUTH DAILY, Disp: 30 tablet, Rfl: 2  •  tamsulosin (FLOMAX) 0.4 MG capsule 24 hr capsule, Take 1 capsule by mouth Daily., Disp: 90 capsule, Rfl: 3    Allergies:   No Known Allergies    IPSS Questionnaire (AUA-7):  Over the past month…    1)  Incomplete Emptying:       How often have you had a sensation of not emptying you had the sensation of not emptying your bladder completely after you finished urinating?  3 - About half the time   2)  Frequency:       How often have you had the urinate again less than two hours after you finished urinating?  3 - About half the time   3)  Intermittency:       How often have you found you stopped and started again several times when you urinated?   3 - About half the time   4) Urgency:      How often have you found it  difficult to postpone urination?  2 - Less than half the time   5) Weak Stream:      How often have you had a weak urinary stream?  4 - More than half the time   6) Straining:       How often have you had to push or strain to begin urination?  0 - Not at all   7) Nocturia:      How many times did you most typically get up to urinate from the time you went to bed at night until the time you got up in the morning?  1 - 1 time   Total Score:  16   The International Prostate Symptom Score (IPSS) is used to screen, diagnose, track symptoms of benign prostatic hyperplasia (BPH).   0-7 (Mild Symptoms) 8-19 (Moderate) 20-35 (Severe)   Quality of Life (QoL):  If you were to spend the rest of your life with your urinary condition just the way it is now, how would you feel about that? 3-Mixed   Urine Leakage (Incontinence) 0-No Leakage     Sexual Health Inventory for Men (MUSHTAQ)   Over the past 6 months:     1. How do you rate your confidence that you could get and keep an erection?  1 - Very low   2. When you had erections with sexual  stimulation, how often were your erections hard enough for penetration (entering your partner)?  0 - No Sexual Activity    3. During sexual intercourse, how often were you able to maintain your erection after you had penetrated (entered) your partner?  0 - Did not attempt intercourse   4. During sexual intercourse, how difficult was it to maintain your erection to completion of intercourse?  0 - Did not attempt intercourse   5. When you attempted sexual intercourse, how often was it satisfactory for you?  0 - Did not attempt intercourse    Total Score: 1   The Sexual Health Inventory for Men further classifies ED severity with the following breakpoints:   1-7 (Severe ED) 8-11 (Moderate ED) 12-16 (Mild to Moderate ED) 17-21 (Mild ED)      Post void residual bladder scan:   211mL    Objective     Physical Exam:   Vital Signs:   Vitals:    02/22/23 0944   BP: 124/68   Pulse: 58   SpO2: 97%  "  Weight: 120 kg (264 lb)   Height: 177.8 cm (70\")   PainSc: 0-No pain     Body mass index is 37.88 kg/m².     Physical Exam  Constitutional:       Appearance: Normal appearance.   HENT:      Head: Normocephalic and atraumatic.      Nose: Nose normal.   Eyes:      Extraocular Movements: Extraocular movements intact.      Conjunctiva/sclera: Conjunctivae normal.      Pupils: Pupils are equal, round, and reactive to light.   Musculoskeletal:         General: Normal range of motion.      Cervical back: Normal range of motion and neck supple.   Skin:     General: Skin is warm and dry.      Findings: No lesion or rash.   Neurological:      General: No focal deficit present.      Mental Status: He is alert and oriented to person, place, and time. Mental status is at baseline.   Psychiatric:         Mood and Affect: Mood normal.         Behavior: Behavior normal.         Labs:   Brief Urine Lab Results  (Last result in the past 365 days)      Color   Clarity   Blood   Leuk Est   Nitrite   Protein   CREAT   Urine HCG        08/15/22 1622 Yellow   Clear     Small                          Lab Results   Component Value Date    GLUCOSE 122 (H) 02/08/2023    CALCIUM 9.5 02/08/2023     02/08/2023    K 3.4 (L) 02/08/2023    CO2 26.5 02/08/2023    CL 99 02/08/2023    BUN 15 02/08/2023    CREATININE 1.11 02/08/2023    EGFRIFAFRI 103 01/26/2022    EGFRIFNONA 85 01/26/2022    BCR 13.5 02/08/2023    ANIONGAP 11.5 02/08/2023       Lab Results   Component Value Date    WBC 14.10 (H) 02/08/2023    HGB 17.9 (H) 02/08/2023    HCT 52.2 (H) 02/08/2023    MCV 92.2 02/08/2023     02/08/2023       Images:   XR Shoulder 2+ View Right (In Office)    Result Date: 11/3/2022  1.  No evidence for displaced fracture or dislocation. 2.  Mild  osteoarthritic degenerative changes of the glenohumeral joint. 3.  Mild  hypertrophic age-related changes of the acromioclavicular joint.  This report was finalized on 11/3/2022 2:01 PM by True" MD Oren.      MRI Shoulder Right Without Contrast    Result Date: 1/3/2023  Rotator cuff tendinopathy with high-grade articular sided partial tear of the distal supraspinatus tendon with a small focal full-thickness perforation. This tear partially propagates posteriorly into the superior most fibers of infraspinatus. Low-grade partial tears of subscapularis and infraspinatus. No associated fatty muscular atrophy of these muscles. There is mild fatty atrophy of teres minor however.  Intra-articular biceps tendinopathy.  Degenerative changes of the acromioclavicular and glenohumeral joints. There is multifocal irregularity of the glenohumeral cartilage with prominence of the cartilage in the inferior aspect of the joint.  Mild edema of the glenohumeral capsule which may reflect capsulitis.  This report was finalized on 1/3/2023 12:00 PM by Dariel Pittman.        Measures:   Tobacco:   Justus Bishop  reports that he quit smoking about 48 years ago. His smoking use included cigarettes. He started smoking about 53 years ago. He has a 2.50 pack-year smoking history. He has been exposed to tobacco smoke. He has never used smokeless tobacco.       Assessment / Plan      Assessment/Plan:   72 y.o. male who presented today for follow up of extreme BPH.  Patient has significant incomplete bladder emptying today which appears to be worsening.  We discussed the implications of incomplete bladder emptying and risk of bladder stone development, renal function impact, recurrent urinary tract infection etc.  At this juncture patient still struggling with some mild obesity and he is trying to lose weight.  He states he is not willing to proceed with any surgical intervention unless he is able to lose some weight and he would like to recheck back in 6 months.  I refilled his tamsulosin and finasteride.  Discussed if he is unable to urinate or develops urinary retention he should contact my office ASAP.  We discussed the  possibility of needing to perform intermittent catheterization, we discussed best option for long-term bladder outlet relief would be a robotic simple prostatectomy, we also discussed laser enucleation of the prostate which would require consideration of referral to high-volume center.  Mildly elevated PSA recently however given his extreme BPH, his PSA density is nonconcerning and given his age I do not recommend any further follow-up or biopsy prior to consideration of surgical intervention.    Diagnoses and all orders for this visit:    1. BPH with obstruction/lower urinary tract symptoms  -     tamsulosin (FLOMAX) 0.4 MG capsule 24 hr capsule; Take 1 capsule by mouth Daily.  Dispense: 90 capsule; Refill: 3  -     finasteride (PROSCAR) 5 MG tablet; Take 1 tablet by mouth Daily.  Dispense: 90 tablet; Refill: 3           Follow Up:   Return in about 6 months (around 8/22/2023) for 6 month cystoscopy, uroflow, PVR .    I spent approximately 20 minutes providing clinical care for this patient; including review of patient's chart and provider documentation, face to face time spent with patient in examination room (obtaining history, performing physical exam, discussing diagnosis and management options), placing orders, and completing patient documentation.     Rob Escobar MD  [facility]

## 2023-02-22 NOTE — PROGRESS NOTES
Procedure   - Large Joint Arthrocentesis: L hip joint on 2/22/2023 2:39 PM  Indications: pain  Details: 25 G needle, ultrasound-guided anterior approach  Medications: 2 mL ropivacaine 0.5 %; 80 mg triamcinolone acetonide 40 MG/ML; 5 mL lidocaine PF 1% 1 %  Outcome: tolerated well, no immediate complications  Procedure, treatment alternatives, risks and benefits explained, specific risks discussed. Consent was given by the patient. Immediately prior to procedure a time out was called to verify the correct patient, procedure, equipment, support staff and site/side marked as required. Patient was prepped and draped in the usual sterile fashion.

## 2023-02-22 NOTE — PROGRESS NOTES
[facility] [place] [facility] Office Visit     Office Visit       Date: 02/22/2023   Patient Name: Justus Bishop  MRN: [id number]  YOB: 1950    Referring Physician: No ref. provider found     Chief Complaint:   Chief Complaint   Patient presents with   • Left Hip - Pain     History of Present Illness:   Justus Bishop is a 72 y.o. male who presents with left hip pain for 3 year(s). Onset atraumatic and gradual in nature. Pain is localized to groin and is a 4/10 on the pain scale.Pain is described as dull and aching. Associated symptoms include pain and stiffness.  The pain is worse with walking and rising from seated position; resting and sitting improve the pain. Previous treatments have included: NSAIDS since symptom onset. Although some transient relief was reported with these interventions, these conservative measures have failed and symptoms have persisted. The patient is limited in daily activities and has had a significant decrease in quality of life as a result. He denies fevers, chills, or constitutional symptoms.    Subjective   Review of Systems: Review of Systems   Constitutional: Negative for chills, fever, unexpected weight gain and unexpected weight loss.   HENT: Negative for congestion, postnasal drip and rhinorrhea.    Eyes: Negative for blurred vision.   Respiratory: Negative for shortness of breath.    Cardiovascular: Negative for leg swelling.   Gastrointestinal: Negative for abdominal pain, nausea and vomiting.   Genitourinary: Negative for difficulty urinating.   Musculoskeletal: Positive for arthralgias. Negative for gait problem, joint swelling and myalgias.   Skin: Negative for skin lesions and wound.   Neurological: Negative for dizziness, weakness, light-headedness and numbness.   Hematological: Does not bruise/bleed easily.   Psychiatric/Behavioral: Negative for depressed mood.        I have reviewed the following portions of  the patient's history:History of Present Illness and review of systems.    Past Medical History:   Past Medical History:   Diagnosis Date   • Ankle sprain 1985   • Anxiety 2019   • Asthma 2016   • Benign prostatic hyperplasia 2017   • Cataract 2006    Repaired   • Clotting disorder ([facility]) 2020    Intermittent (2 times, once recent)   • COPD (chronic obstructive pulmonary disease) ([facility])    • Depression 2019   • Frozen shoulder    • Hip arthrosis    • HL (hearing loss)    • Hypertension    • Knee swelling    • Periarthritis of shoulder    • Rotator cuff syndrome    • Substance abuse ([facility]) 2019    resolved   • Tear of meniscus of knee    • Tendinitis of knee    • Tremor 2019       Past Surgical History:   Past Surgical History:   Procedure Laterality Date   • CARDIAC CATHETERIZATION      clear   • EYE SURGERY      cataract   • KNEE SURGERY         Family History:   Family History   Problem Relation Age of Onset   • Depression Mother    • Kidney disease Mother    • Cancer Father         Multiple Myeloma   • Early death Father    • Heart disease Father    • Hypertension Sister    • Heart disease Sister    • Pulmonary fibrosis Sister        Social History:   Social History     Socioeconomic History   • Marital status:    Tobacco Use   • Smoking status: Former     Packs/day: 0.50     Years: 5.00     Pack years: 2.50     Types: Cigarettes     Start date: 1970     Quit date: 1975     Years since quittin.1     Passive exposure: Past   • Smokeless tobacco: Never   Vaping Use   • Vaping Use: Never used   Substance and Sexual Activity   • Alcohol use: Not Currently     Comment: 0-3x/daily; <6x/week   • Drug use: Not Currently     Types: Marijuana     Comment: 0-3x/daily; 15x/monthly for 3 yrs prior to quitting in    • Sexual activity: Not Currently     Partners: Female       Medications:   Current Outpatient Medications:   •  albuterol sulfate  (90 Base)  "MCG/ACT inhaler, Inhale 2 puffs Every 4 (Four) Hours As Needed for Wheezing., Disp: 1 g, Rfl: 0  •  atorvastatin (LIPITOR) 20 MG tablet, TAKE ONE TABLET BY MOUTH DAILY, Disp: 90 tablet, Rfl: 0  •  finasteride (PROSCAR) 5 MG tablet, Take 1 tablet by mouth Daily., Disp: 90 tablet, Rfl: 3  •  Fluticasone Furoate-Vilanterol (Breo Ellipta) 200-25 MCG/INH inhaler, Inhale 1 puff Daily., Disp: 1 each, Rfl: 2  •  hydroCHLOROthiazide (HYDRODIURIL) 25 MG tablet, TAKE ONE TABLET BY MOUTH DAILY, Disp: 30 tablet, Rfl: 2  •  meloxicam (MOBIC) 7.5 MG tablet, Take 1 tablet by mouth Daily With Breakfast., Disp: 30 tablet, Rfl: 0  •  mupirocin (BACTROBAN) 2 % ointment, APPLY TO AFFECTED AREA(S) TWO TIMES A DAY, Disp: 22 g, Rfl: 1  •  olmesartan (BENICAR) 40 MG tablet, TAKE ONE TABLET BY MOUTH DAILY, Disp: 30 tablet, Rfl: 2  •  tamsulosin (FLOMAX) 0.4 MG capsule 24 hr capsule, Take 1 capsule by mouth Daily., Disp: 90 capsule, Rfl: 3    Allergies: No Known Allergies    I reviewed the patient's chief complaint, history of present illness, review of systems, past medical history, surgical history, family history, social history, medications and allergy list.     Objective    Vital Signs:   Vitals:    02/22/23 1410   BP: 148/74   Weight: 120 kg (264 lb)   Height: 177.8 cm (70\")     Body mass index is 37.88 kg/m².   Class 2 Severe Obesity (BMI >=35 and <=39.9). Obesity-related health conditions include the following: hypertension, coronary heart disease, diabetes mellitus and dyslipidemias. Obesity is newly identified. BMI is is above average; BMI management plan is completed. We discussed portion control and increasing exercise.     Patient reports that he is a former smoker and quit in 1975.  He has not resumed smoking since that time.  This behavior was applauded and she was encouraged to continue in smoking cessation.  We will continue to monitor at subsequent visits.    Ortho Exam:  Constitutional: General Appearance: " healthy-appearing, NAD, and normal body habitus.   Psychiatric: Orientation: oriented to time, place, and person. Mood and Affect: normal affect and mood and active and alert.   Gait and Station: Appearance: antalgic gait.   Cardiovascular System: Arterial Pulses Right: dorsalis pedis pulse normal. Varicosities Right: capillary refill test normal.   Lumbar Spine: Inspection: normal alignment.   Hip/Pelvis Appearance: Inspection: normal axial alignment.   Hips: Soft Tissue Palpation Left: tenderness of the hip flexor muscles. Active Range of Motion Left: limited (secondary to pain especially flexion and rotation). Strength Right: normal 5/5. Strength Left: normal 5/5.   Skin: Right Lower Extremity: normal. Left Lower Extremity: normal.   Neurologic: Sensation on the Right: L1 normal, L2 normal, L3 normal, L4 normal, and S2 normal. Sensation on the Left: L1 normal, L2 normal, L3 normal, L4 normal, and S2 normal.    Results Review:   Imaging Results (Last 24 Hours)     Procedure Component Value Units Date/Time    XR Hip With or Without Pelvis 2 - 3 View Left [[id number]] Resulted: 02/22/23 1425     Updated: 02/22/23 1430    Narrative:      Indication: Left hip pain    Views: AP pelvis and lateral view of the hip are submitted.    Impression:  There are no acute findings. There is no fracture subluxation   or dislocation.  Degenerative changes of moderate severity within the hip   joint space.  Aspherical femoral head.    Comparison: No additional images available for comparison review.          Procedures    Assessment / Plan    Assessment/Plan:   Diagnoses and all orders for this visit:    1. Left hip pain (Primary)  -     XR Hip With or Without Pelvis 2 - 3 View Left  -     US Guided Injection    2. Primary osteoarthritis of left hip  -     Ambulatory Referral to Physical Therapy Evaluate and treat, Ortho; Electrotherapy; E-stim; Soft Tissue Mobilizaton; Stretching, ROM; Left; Full weight bearing    3. Type 2 diabetes  mellitus with hyperglycemia, without long-term current use of insulin (HCC)    Other orders  -     - Large Joint Arthrocentesis: L hip joint      3 years of left anterior hip and groin pain.  Worsened by internal and external rotation.  He is unable to cross his legs.  Radiographs showed moderate degenerative changes within the hip joint.  These findings are reproducible on physical exam. Findings and left hip osteoarthritis diagnosis were discussed. Treatment options include nonop treatment in the form of NSAIDs, PT, intraarticular injections. The natural history of slow progression was conveyed.  He does have type 2 diabetes and thus we must be mindful of impact of our treatment on his blood glucose level and hemoglobin A1c.  I do think the benefits outweigh the risk of ultrasound-guided corticosteroid injection into the hip joint especially as it relates to his quality of life and control of increasing his physical activity to combat his obesity.  After discussion of the risks and benefits, patient elects to proceed and tolerated procedure well.  See procedure note.  He should continue with meloxicam as previously prescribed by me.  I will give him a new referral to physical therapy to target his hip now.  This should primarily focus on range of motion.  I plan to see him back in 6 weeks to monitor his response and decide on additional treatment options.    Previous documentation reviewed: 2/8/2023-Misty Mai DO-office visit.    Previous laboratory results reviewed: 2/8/2023-creatinine 1.11, EGFR 70.6, hemoglobin A1c 6.90%.    Follow Up:   Return in about 6 weeks (around 4/5/2023).      Roger Cook MD  [facility]

## 2023-02-23 ENCOUNTER — TREATMENT (OUTPATIENT)
Dept: PHYSICAL THERAPY | Facility: CLINIC | Age: 73
End: 2023-02-23
Payer: MEDICARE

## 2023-02-23 DIAGNOSIS — M25.552 LEFT HIP PAIN: ICD-10-CM

## 2023-02-23 DIAGNOSIS — M16.52 POST-TRAUMATIC OSTEOARTHRITIS OF LEFT HIP: Primary | ICD-10-CM

## 2023-02-23 PROCEDURE — 97140 MANUAL THERAPY 1/> REGIONS: CPT | Performed by: PHYSICAL THERAPIST

## 2023-02-23 PROCEDURE — 97110 THERAPEUTIC EXERCISES: CPT | Performed by: PHYSICAL THERAPIST

## 2023-02-23 PROCEDURE — 97161 PT EVAL LOW COMPLEX 20 MIN: CPT | Performed by: PHYSICAL THERAPIST

## 2023-02-23 NOTE — PROGRESS NOTES
Physical Therapy Initial Evaluation and Plan of Care  [facility] [place]  [address] [facility], [address] [address]        Patient: Justus Bishop   : 1950  Diagnosis/ICD-10 Code:  Post-traumatic osteoarthritis of left hip [M16.52]  Referring practitioner: Oumar Cook MD  Date of Initial Visit: 2023  Today's Date: 2023  Patient seen for 1 sessions         Visit Diagnoses:    ICD-10-CM ICD-9-CM   1. Post-traumatic osteoarthritis of left hip  M16.52 715.25   2. Left hip pain  M25.552 719.45         Subjective Questionnaire: LEFS: 40/80    Subjective Evaluation    History of Present Illness  Mechanism of injury: Left anterior hip pain for 2 years. Worsening with time.  Recently received injection into left hip, yesterday.  Seems to be working already.    Pain  Current pain rating: 3  At best pain rating: 3  At worst pain ratin  Location: Left anterior hip pain, constanty with varying intensity  Exacerbated by: Trying to rotation left hip.  Progression: worsening             Objective          Static Posture     Hip   Hip (Left): Increased flexion.   Hip (Right): Increased flexion.     Tenderness     Additional Tenderness Details  Left anterior hip joint tender    Active Range of Motion   Left Hip   Flexion: 105 degrees   Extension: 0 degrees with pain  Abduction: 42 degrees with pain  External rotation (90/90): 40 degrees with pain  Internal rotation (90/90): 5 degrees with pain    Right Hip   Flexion: 110 degrees   Extension: 5 degrees   Abduction: 65 degrees   External rotation (90/90): 65 degrees   Internal rotation (90/90): 20 degrees     Joint Play   Left Hip   Hypomobile in the posterior hip capsule, anterior hip capsule, lateral hip capsule and long axis distraction.    Strength/Myotome Testing     Left Hip   Planes of Motion   Flexion: 4-  Extension: 4  Abduction: 4    Tests     Left Hip   Positive MAHI, FADIR and scour.     Left Hip Flexibility Comments:    IT band tight  Hip flexors tight  Hamstrings tight    Ambulation     Observational Gait   Gait: asymmetric           Assessment & Plan     Assessment  Impairments: abnormal or restricted ROM, activity intolerance, pain with function and weight-bearing intolerance  Functional Limitations: lifting, walking, uncomfortable because of pain and standing  Assessment details: 72 year old male presenting with chronic anterior/medial left hip joint pain.  Recently received an injection into the left hip joint, this has significantly reduced his discomfort.  He has clinical signs & symptoms and imagery of left hip DJD.  Neurological exam is normal today, lumbar exam does not appear to reproduce left hip pain.  Prognosis: good    Goals  Plan Goals: STG to be met in 4 weeks  1.  Pt is able to stand upright without exacerbating left hip pain.  2.  Pt has improved hip abduction strength to 5/5 to help with functional stabilization of the hip joint.  3.  Pt has improved hip function so that LEFS score improves to 45/80.  LTG to be met in 12 weeks  1.  Pt is independent with each phase of HEP.  2.  Pt hip strength is 5/5 throughout so that he can walk for longer periods of time.  3.  Pt is able to extend hip 5 degrees on left to help normalize gait.  4.  Pt has improved hip function so that LEFS score improves to 60/80.    Plan  Therapy options: will be seen for skilled therapy services  Planned modality interventions: ultrasound, traction, high voltage pulsed current (pain management) and dry needling  Planned therapy interventions: abdominal trunk stabilization, balance/weight-bearing training, body mechanics training, flexibility, functional ROM exercises, gait training, IADL retraining, home exercise program, joint mobilization, transfer training, therapeutic activities, stretching, strengthening, spinal/joint mobilization, soft tissue mobilization, postural training, neuromuscular re-education and manual therapy  Frequency:  2x week  Duration in weeks: 12        Timed:         Manual Therapy:    15     mins  45681;     Therapeutic Exercise:    23     mins  57137;     Neuromuscular Tammi:        mins  64610;    Therapeutic Activity:          mins  57448;     Gait Training:           mins  30458;     Ultrasound:          mins  57670;    Ionto                                   mins   41652  Self Care                            mins   44200  Canalith Repos         mins 67911      Un-Timed:  Electrical Stimulation:         mins  56839 ( );  Dry Needling          mins self-pay  Traction          mins 52327  Low Eval     20     Mins  05041  Mod Eval          Mins  55366  High Eval                            Mins  96010        Timed Treatment:   38   mins   Total Treatment:     58   mins          PT: True Andrew, PT     License Number: [id number]  Electronically signed by True Andrew PT, 02/23/23, 11:23 AM EST    Medicare Initial Certification  Certification Period: 5/24/2023  I certify that the therapy services are furnished while this patient is under my care.  The services outlined above are required by this patient, and will be reviewed every 90 days.     PHYSICIAN: ________________________________________________________  Oumar Cook MD        DATE: ____________________________________________________________    Please sign and return via fax to [phone].. Thank you, [facility].

## 2023-03-01 ENCOUNTER — TELEPHONE (OUTPATIENT)
Dept: PHYSICAL THERAPY | Facility: CLINIC | Age: 73
End: 2023-03-01

## 2023-04-04 RX ORDER — ATORVASTATIN CALCIUM 20 MG/1
TABLET, FILM COATED ORAL
Qty: 90 TABLET | Refills: 1 | Status: SHIPPED | OUTPATIENT
Start: 2023-04-04

## 2023-04-04 RX ORDER — OLMESARTAN MEDOXOMIL 40 MG/1
TABLET ORAL
Qty: 90 TABLET | Refills: 1 | Status: SHIPPED | OUTPATIENT
Start: 2023-04-04

## 2023-04-04 NOTE — TELEPHONE ENCOUNTER
Rx Refill Note  Requested Prescriptions     Pending Prescriptions Disp Refills   • atorvastatin (LIPITOR) 20 MG tablet [Pharmacy Med Name: ATORVASTATIN 20 MG TABLET] 90 tablet 0     Sig: TAKE ONE TABLET BY MOUTH DAILY   • olmesartan (BENICAR) 40 MG tablet [Pharmacy Med Name: OLMESARTAN MEDOXOMIL 40 MG TAB] 30 tablet 2     Sig: TAKE ONE TABLET BY MOUTH DAILY      Last office visit with prescribing clinician: 2/8/2023   Last telemedicine visit with prescribing clinician: 8/17/2023   Next office visit with prescribing clinician: 8/17/2023                         Would you like a call back once the refill request has been completed: [] Yes [] No    If the office needs to give you a call back, can they leave a voicemail: [] Yes [] No    Lilo Soares MA  04/04/23, 10:02 EDT

## 2023-04-05 NOTE — TELEPHONE ENCOUNTER
Rx Refill Note  Requested Prescriptions     Pending Prescriptions Disp Refills   • Breo Ellipta 200-25 MCG/ACT inhaler [Pharmacy Med Name: BREO ELLIPTA 200-25 MCG INH] 60 each      Sig: INHALE ONE PUFF BY MOUTH DAILY      Last office visit with prescribing clinician: 2/8/2023   Last telemedicine visit with prescribing clinician: 8/17/2023   Next office visit with prescribing clinician: 8/17/2023                         Would you like a call back once the refill request has been completed: [] Yes [] No    If the office needs to give you a call back, can they leave a voicemail: [] Yes [] No    Olga Wilson LPN  04/05/23, 12:14 EDT

## 2023-05-06 RX ORDER — HYDROCHLOROTHIAZIDE 25 MG/1
TABLET ORAL
Qty: 90 TABLET | Refills: 0 | Status: SHIPPED | OUTPATIENT
Start: 2023-05-06

## 2023-05-06 NOTE — TELEPHONE ENCOUNTER
Rx Refill Note  Requested Prescriptions     Pending Prescriptions Disp Refills   • hydroCHLOROthiazide (HYDRODIURIL) 25 MG tablet [Pharmacy Med Name: hydroCHLOROthiazide 25 MG TABLET] 90 tablet      Sig: TAKE ONE TABLET BY MOUTH DAILY      Last office visit with prescribing clinician: 2/8/2023   Last telemedicine visit with prescribing clinician: 8/17/2023   Next office visit with prescribing clinician: 8/17/2023                         Would you like a call back once the refill request has been completed: [] Yes [] No    If the office needs to give you a call back, can they leave a voicemail: [] Yes [] No    Miriam Beebe  05/06/23, 11:25 EDT

## 2023-08-03 RX ORDER — HYDROCHLOROTHIAZIDE 25 MG/1
TABLET ORAL
Qty: 90 TABLET | Refills: 0 | Status: SHIPPED | OUTPATIENT
Start: 2023-08-03

## 2023-08-22 ENCOUNTER — PROCEDURE VISIT (OUTPATIENT)
Dept: UROLOGY | Facility: CLINIC | Age: 73
End: 2023-08-22
Payer: MEDICARE

## 2023-08-22 VITALS
DIASTOLIC BLOOD PRESSURE: 66 MMHG | OXYGEN SATURATION: 92 % | SYSTOLIC BLOOD PRESSURE: 112 MMHG | HEART RATE: 87 BPM | HEIGHT: 70 IN | BODY MASS INDEX: 37.8 KG/M2 | WEIGHT: 264 LBS

## 2023-08-22 DIAGNOSIS — N13.8 BPH WITH OBSTRUCTION/LOWER URINARY TRACT SYMPTOMS: Primary | ICD-10-CM

## 2023-08-22 DIAGNOSIS — N40.1 BPH WITH OBSTRUCTION/LOWER URINARY TRACT SYMPTOMS: Primary | ICD-10-CM

## 2023-08-22 NOTE — PROGRESS NOTES
Follow Up Office Visit      Patient Name: Justus Bishop  : 1950   MRN: [id number]     Chief Complaint:    Chief Complaint   Patient presents with    Cystoscopy, Uroflow, PVR       Referring Provider: No ref. provider found    History of Present Illness: Justus Bishop is a 72 y.o. male who presents today for follow up of extreme BPH.  Patient was evaluated original in clinic on 2022.  History of gross hematuria, had previously been following with an outside urologist in [place].  He has been managed with tamsulosin and finasteride.  IPSS score moderate at 16 originally.  Mildly elevated PSA at 4.4.    Lab Results   Component Value Date    PSA 4.440 (H) 2022     CT urogram and flexible cystoscopy for work-up of gross hematuria demonstrated extreme BPH but no obvious additional urologic concerns.  Prostate volume on CT is roughly 150 cc.  He has an intravesical median lobe.    I last saw the patient in February, he was initiated on finasteride in 2022.  I saw him again in February.  PVR was 211 mL, significant incomplete bladder emptying.  He deferred further discussion regarding bladder outlet surgery given his desire to lose weight and get healthy, he now returns in follow-up.  For further evaluation regarding his urinary concerns and extreme BPH he was offered cystoscopy and uroflow, he elects to proceed after discussion of risks.    Former history of testosterone deficiency, in 2022 his testosterone was normal, 328.    Subjective      Review of System: Review of Systems   Genitourinary:  Positive for difficulty urinating, frequency and urgency.    I have reviewed the ROS documented by my clinical staff, I have updated appropriately and I agree. Rob Escobar MD    I have reviewed and the following portions of the patient's history were updated as appropriate: past family history, past medical history, past social history, past surgical history and problem  "list.    Medications:     Current Outpatient Medications:     albuterol sulfate  (90 Base) MCG/ACT inhaler, Inhale 2 puffs Every 4 (Four) Hours As Needed for Wheezing., Disp: 1 g, Rfl: 0    atorvastatin (LIPITOR) 20 MG tablet, TAKE ONE TABLET BY MOUTH DAILY, Disp: 90 tablet, Rfl: 1    Breo Ellipta 200-25 MCG/ACT inhaler, INHALE ONE PUFF BY MOUTH DAILY, Disp: 60 each, Rfl: 2    finasteride (PROSCAR) 5 MG tablet, Take 1 tablet by mouth Daily., Disp: 90 tablet, Rfl: 3    hydroCHLOROthiazide (HYDRODIURIL) 25 MG tablet, TAKE ONE TABLET BY MOUTH DAILY, Disp: 90 tablet, Rfl: 0    olmesartan (BENICAR) 40 MG tablet, TAKE ONE TABLET BY MOUTH DAILY, Disp: 90 tablet, Rfl: 1    tamsulosin (FLOMAX) 0.4 MG capsule 24 hr capsule, Take 1 capsule by mouth Daily., Disp: 90 capsule, Rfl: 3    meloxicam (MOBIC) 7.5 MG tablet, Take 1 tablet by mouth Daily With Breakfast. (Patient not taking: Reported on 8/22/2023), Disp: 30 tablet, Rfl: 0    mupirocin (BACTROBAN) 2 % ointment, APPLY TO AFFECTED AREA(S) TWO TIMES A DAY (Patient not taking: Reported on 8/22/2023), Disp: 22 g, Rfl: 1    Allergies:   No Known Allergies      Objective     Physical Exam:   Vital Signs:   Vitals:    08/22/23 1200   BP: 112/66   Pulse: 87   SpO2: 92%   Weight: 120 kg (264 lb)   Height: 177.8 cm (70\")   PainSc: 0-No pain     Body mass index is 37.88 kg/mý.     Physical Exam  Vitals and nursing note reviewed.   Constitutional:       Appearance: Normal appearance.   HENT:      Head: Normocephalic and atraumatic.      Mouth/Throat:      Mouth: Mucous membranes are moist.      Pharynx: Oropharynx is clear.   Eyes:      Extraocular Movements: Extraocular movements intact.      Conjunctiva/sclera: Conjunctivae normal.   Cardiovascular:      Rate and Rhythm: Normal rate and regular rhythm.   Pulmonary:      Effort: Pulmonary effort is normal. No respiratory distress.   Abdominal:      Palpations: Abdomen is soft.      Tenderness: There is no abdominal tenderness. " There is no right CVA tenderness or left CVA tenderness.   Genitourinary:     Comments: Circumcised phallus, orthotopic meatus, bilaterally descended testicles without masses, or lesions.       Musculoskeletal:         General: Normal range of motion.      Cervical back: Normal range of motion.   Skin:     General: Skin is warm and dry.   Neurological:      General: No focal deficit present.      Mental Status: He is alert and oriented to person, place, and time.   Psychiatric:         Mood and Affect: Mood normal.         Behavior: Behavior normal.       Labs:   Brief Urine Lab Results  (Last result in the past 365 days)        Color   Clarity   Blood   Leuk Est   Nitrite   Protein   CREAT   Urine HCG        08/22/23 1206 Yellow   Clear   Negative   Trace   Negative   Negative                        Lab Results   Component Value Date    GLUCOSE 122 (H) 02/08/2023    CALCIUM 9.5 02/08/2023     02/08/2023    K 3.4 (L) 02/08/2023    CO2 26.5 02/08/2023    CL 99 02/08/2023    BUN 15 02/08/2023    CREATININE 1.11 02/08/2023    EGFRIFAFRI 103 01/26/2022    EGFRIFNONA 85 01/26/2022    BCR 13.5 02/08/2023    ANIONGAP 11.5 02/08/2023       Lab Results   Component Value Date    WBC 14.10 (H) 02/08/2023    HGB 17.9 (H) 02/08/2023    HCT 52.2 (H) 02/08/2023    MCV 92.2 02/08/2023     02/08/2023       Images:   No Images in the past 120 days found..    Measures:   Tobacco:   Justus Bishop  reports that he quit smoking about 48 years ago. His smoking use included cigarettes. He started smoking about 53 years ago. He has a 2.50 pack-year smoking history. He has been exposed to tobacco smoke. He has never used smokeless tobacco.    ----------------------    Preprocedure diagnosis  BPH with LUTS    Postprocedure diagnosis  BPH with LUTS    Procedure  Flexible Cystourethroscopy   Uroflowmetry     Attending surgeon  Rob Escobar MD    Anesthesia  2% lidocaine jelly  intraurethrally    Complications  None    Indications  72 y.o. male undergoing a flexible cystoscopy for the above mentioned indications.  Informed consent was obtained.      Findings  No obvious urethral stricture, bladder tumor, bladder stones, or urothelial lesions identified.    Severe lateral lobe hyperplasia, large intravesical median lobe.  Mild trabeculation chronic findings indicative of likely chronic bladder outlet obstruction     CT imaging reviewed and prostate sizing personally performed (height (cm) x length (cm) x width (cm) x ?/6) = 148 cc/mL.     Procedure  The patient was placed in supine position and prepped and draped in sterile fashion with lidocaine jelly instilled per the urethra for local anesthesia 5 minutes prior to procedure start.  A brief timeout was performed including available nursing staff and the patient.      The 16 Fr digital flexible cystoscope was lubricated and gently advanced into the urethral meatus.     Penile and bulbar urethra appeared widely patent without visible lesion or stricture.   Prostatic urethra demonstrates severe lateral lobe coaptation, with large intravesical median lobe component.   Scope then advanced into the bladder. Bladder was completely visualized starting with the trigone.   There were bilateral orthotopic ureteral orifices effluxing clear urine.   Posterior, lateral, anterior walls, and dome completely visualized revealing NO obvious mucosal lesions, tumors, or bladder stones.    There is mild trabeculation along the posterior and lateral walls with no bladder divericuli.     Cystoscope was retroflexed and bladder neck and prostate further visualized confirming intravesical lateral and intravesical median lobe impinging on bladder basin extending into bladder lumen.     The bladder was left filled, and the cystoscope was then gently withdrawn while visualizing the urethra and the procedure was then terminated.  The patient tolerated the procedure  well.      He then voided for uroflowmetry measurement and PVR bladder scan was performed afterwards.     Discussion:  The patient was moved from the procedure room to a clinic examination room to review results of BPH workup today.     Uroflow   Avg flow: 5.0 ml/sec  Max flow: 8.3 ml/sec  Time to max flow: 9.8 sec  Voided volume: 253 mL     PVR bladder scan: 11 mL     I have evaluated the patient's ongoing urinary symptoms and options for management with the patient today.     The patient's cystoscopy and uroflow results indicate obstruction, with elevated post void residual volumes on ultrasound.     Prostate volume is extreme 148 cc.     The patient was counseled regarding options regarding his continued BPH with lower urinary tract symptoms. He is currently medically managed with tamsulosin and finasteride.    Medical Options vs Continued Observation  Continued monitoring may be appropriate but this is a shared decision made with patient based on assumed risk.     Continued alpha blocker therapy, the addition of 5-alpha reductase inhibitors (if not already prescribed), or additional anticholinergic/beta 3-agonist medications discussed as options.    Surgical Options  Decision to proceed with surgical intervention is multi-factorial and based on patient degree of bother, presumed risk of bladder health decline over time, desire to limit or reduce medication usage (polypharmacy), and avoidance of potential side effects of BPH medication usage (light headedness, hypotension, sexual dysfunction, retrograde ejaculation, dry eye/mouth, memory changes, etc).      Surgical options include transurethral resection of prostate, greenlight photo vaporization of prostate, Urolift (prostatic urethral lift), Aquablation, robotic simple prostatectomy (for extreme BPH).     Downsides to TURP include: likely need for overnight inpatient admission, higher bleeding risk, need for bladder irrigation.     Greenlight PVP benefits  include: reduced bleeding risk, possible outpatient procedure but does require catheter placement for 48 hours or more or minimally invasive     Urolift benefits include: typically outpatient, often no catheter required post-op, preserves ejaculation without ED risk, prostate volume must be less than 100 cc, good data out to 5 years+ currently, may need additional more aggressive surgery long term if re-growth/re-obstruction, re-treatment rate typically listed as 15% or more long term.     I discussed that both TURP and Greenlight PVP will result in retrograde ejaculation or possibly anejaculation depending on aggressiveness of tissue removal, small risk of transient vs long term (rare) stress incontinence, small risk of urethral stricture or bladder neck contracture, ureteral injury, tissue regrowth requiring additional procedure long term, bladder perforation requiring repair, infection, hematuria, and anesthetic related complications not withstanding. Greenlight PVP and TURP may result in possible better long term symptom reduction vs Urolift however long term data is still accumulating regarding Urolift.     Aquablation is a new BPH treatment option available at [facility]. This accomplishes the same goals as TURP, Greenlight PVP, or robotic simple prostatectomy with efficient tissue removal with pressurized waterjet therapy followed by transurethral fulguration of bleeding vessels with a cautery loop.  Benefit of this operation is that according to recent literature has less risk of long-term irreversible side effects such as retrograde ejaculation or urinary incontinence while achieving significant and efficient prostate tissue removal.  I still  patients regarding the classic risks of any bladder outlet surgery similar to greenlight and TURP listed above.    He was moved to clinic examination room for further discussion.       Assessment / Plan      Assessment/Plan:   72 y.o. male who presented  today for follow up of extreme BPH.  Patient has a very large intravesical median lobe and severe lateral lobe hyperplasia.  On cystoscopy today when we entered his bladder his bladder was significantly full.  He is likely losing sensation of a full bladder given chronic bladder outlet obstruction.  After a thorough discussion regarding treatment options he would like to proceed with minimally invasive surgery.  Best option for him given his gland size will be aqua ablation, currently available at [facility].  Risk include bleeding, infection, bladder neck contracture, urethral stricture, prostatic perforation, ureteral injury, bladder injury, anesthesia related complications.  He is following up with a cardiologist in September, we will request clearance at that time.      First available date for procedure is September 29, 2023      Diagnoses and all orders for this visit:    1. BPH with obstruction/lower urinary tract symptoms (Primary)  -     POC Urinalysis Dipstick, Automated  -     Uroflowmetry; Future  -     Case Request; Standing  -     CBC (No Diff); Future  -     Basic Metabolic Panel; Future  -     Urinalysis without microscopic (no culture) - Urine, Clean Catch; Future  -     ECG 12 Lead; Future  -     ceFAZolin (ANCEF) 2,000 mg in sodium chloride 0.9 % 100 mL IVPB  -     Case Request    Other orders  -     Follow Anesthesia Guidelines / Protocol; Future  -     Provide NPO Instructions to Patient; Future  -     Provide Hydration Instructions to Patient; Future  -     Provide Chlorhexidine Skin Prep Wipes and Instructions; Future  -     Obtain Informed Consent; Future  -     Nursing to Order Blood Glucose on all Inpatients >18 years Old with not BMP Ordered; Future  -     Nursing to Place Order for HgbA1c on Adult Patients >18 Years Old (HgbA1c Within One Month of Admission Acceptable); Future  -     Follow Anesthesia Guidelines / Protocol; Standing  -     Verify NPO Status; Standing  -     Verify the Time  Patient Completed [place]ade / G2; Standing  -     Initiate Observation Status; Standing  -     Nurse to Contact Surgeon for Cardiology Consult if Indicated; Future  -     Nurse to Consult  Anesthesia if Indicated; Future           Follow Up:   No follow-ups on file.    I spent approximately 30 minutes providing clinical care for this patient; including review of patient's chart and provider documentation, face to face time spent with patient in examination room (obtaining history, performing physical exam, discussing diagnosis and management options), placing orders, and completing patient documentation.     Rob Escobar MD  [facility]

## 2023-09-08 NOTE — PROGRESS NOTES
OFFICE VISIT  NOTE  [facility] CARDIOLOGY      Name: Justus Bishop    Date: 2023  MRN:  [id number]  :  1950      REFERRING/PRIMARY PROVIDER:  Misty Mai DO     Chief Complaint   Patient presents with    Follow-up diastolic heart failure   Cardiac clearance        HPI: Justus Bishop is a 73 y.o. male who presents today for follow up of diastolic dysfunction and cardiac clearance for upcoming cystoscopy/prostate ablation.  Echo 2022 showed normal ejection fraction with grade 1 diastolic dysfunction.  He moved from [place] to [place],  had a rough year last year with a divorce and some depression, and has gained some weight.  Reports some stable shortness of breath but likely from asthma.  Denies chest pain.  Cardiac work-up in [place] in 2020 include an echo which showed normal ejection fraction grade 1 diastolic dysfunction, normal event monitor, and relatively benign coronary calcium scan with a score of 13. Since last visit, he has gained about 25 lbs, recent A1C 6.9%. He is planning to start exercising at [employer] once recovered from his prostate ablation. He admits to dietary indiscretions now and sedentary lifestyle. He denies any chest pain to suggest angina, and no MORAN or heart failure symptoms.      ROS:Pertinent positives as listed in the HPI.  All other systems reviewed and negative.    Past Medical History:   Diagnosis Date    Ankle sprain 1985    Anxiety 2019    Asthma 2016    Benign prostatic hyperplasia 2017    BPH with obstruction/lower urinary tract symptoms 2023    Cataract 2006    Repaired    Clotting disorder 2020    Intermittent (2 times, once recent)    COPD (chronic obstructive pulmonary disease)     Depression 2019    Diabetes mellitus 23    Frozen shoulder     Hip arthrosis 2019    HL (hearing loss) 2000    Hormone disorder 2018    Low Testosterone    Hypertension 2018    Knee swelling 1980    Periarthritis of  shoulder     Rotator cuff syndrome 2020    Substance abuse 2019    resolved    Tear of meniscus of knee 1985    Tendinitis of knee 1985    Tremor 2019       Past Surgical History:   Procedure Laterality Date    CARDIAC CATHETERIZATION  2016    clear    EYE SURGERY  2006    cataract    KNEE SURGERY  1985       Social History     Socioeconomic History    Marital status:    Tobacco Use    Smoking status: Former     Packs/day: 0.50     Years: 5.00     Pack years: 2.50     Types: Cigarettes     Start date: 1970     Quit date: 1975     Years since quittin.7     Passive exposure: Past    Smokeless tobacco: Never   Vaping Use    Vaping Use: Never used   Substance and Sexual Activity    Alcohol use: Not Currently     Comment: 0-3x/daily; <6x/week    Drug use: Not Currently     Types: Marijuana     Comment: 0-3x/daily; 15x/monthly for 3 yrs prior to quitting in     Sexual activity: Not Currently     Partners: Female       Family History   Problem Relation Age of Onset    Depression Mother     Kidney disease Mother     Kidney cancer Mother     Cancer Father         Multiple Myeloma    Early death Father     Heart disease Father     Hypertension Sister     Heart disease Sister     Pulmonary fibrosis Sister         No Known Allergies    Current Outpatient Medications   Medication Instructions    albuterol sulfate  (90 Base) MCG/ACT inhaler 2 puffs, Inhalation, Every 4 Hours PRN    atorvastatin (LIPITOR) 20 MG tablet TAKE ONE TABLET BY MOUTH DAILY    Breo Ellipta 200-25 MCG/ACT inhaler INHALE ONE PUFF BY MOUTH DAILY    finasteride (PROSCAR) 5 mg, Oral, Daily    hydroCHLOROthiazide (HYDRODIURIL) 25 MG tablet TAKE ONE TABLET BY MOUTH DAILY    mupirocin (BACTROBAN) 2 % ointment APPLY TO AFFECTED AREA(S) TWO TIMES A DAY    olmesartan (BENICAR) 40 MG tablet TAKE ONE TABLET BY MOUTH DAILY    tamsulosin (FLOMAX) 0.4 mg, Oral, Daily       Vitals:    23 0908   BP: 120/70   BP Location: Right arm  "  Patient Position: Sitting   Cuff Size: Adult   Pulse: 60   SpO2: 99%   Weight: 124 kg (273 lb 3.2 oz)   Height: 180.3 cm (71\")     Body mass index is 38.1 kg/m².    PHYSICAL EXAM:    General Appearance:   well developed  well nourished  Neck:  thyroid not enlarged  supple  Respiratory:  no respiratory distress  normal breath sounds  no rales  Cardiovascular:  no jugular venous distention  regular rhythm  apical impulse normal  S1 normal, S2 normal  no S3, no S4   no murmur  no rub, no thrill  lower extremity edema: none    Skin:   warm, dry    RESULTS:     ECG 12 Lead    Date/Time: 9/14/2023 9:15 AM  Performed by: Leonor Mcintyre PA-C  Authorized by: Leonor Mcintyre PA-C   Comparison: compared with previous ECG from 9/8/2022  Similar to previous ECG  Rhythm: sinus rhythm  Rate: normal  BPM: 83  Conduction: left anterior fascicular block    Clinical impression: abnormal EKG        Results for orders placed during the hospital encounter of 06/27/22    Adult Transthoracic Echo Complete W/ Cont if Necessary Per Protocol    Interpretation Summary  · Left ventricular ejection fraction appears to be 56 - 60%. Left ventricular systolic function is normal.  · Left ventricular diastolic function is consistent with (grade I) impaired relaxation.  · Estimated right ventricular systolic pressure from tricuspid regurgitation is normal (<35 mmHg). Calculated right ventricular systolic pressure from tricuspid regurgitation is 23 mmHg.      Labs:  Lab Results   Component Value Date    CHOL 145 02/08/2023    TRIG 182 (H) 02/08/2023    HDL 44 02/08/2023    LDL 70 02/08/2023    AST 14 02/08/2023    ALT 28 02/08/2023     Lab Results   Component Value Date    HGBA1C 6.90 (H) 02/08/2023     Creatinine   Date Value Ref Range Status   02/08/2023 1.11 0.76 - 1.27 mg/dL Final   07/21/2022 1.05 0.76 - 1.27 mg/dL Final   06/13/2022 0.96 0.76 - 1.27 mg/dL Final     eGFR Non  Am   Date Value Ref Range Status "   01/26/2022 85 >60 mL/min/1.73 Final     Comment:     The MDRD GFR formula is only valid for adults with stable  renal function between ages 18 and 70.         ASSESSMENT:  Problem List Items Addressed This Visit          Cardiac and Vasculature    Essential hypertension    Relevant Orders    ECG 12 Lead    Hyperlipidemia LDL goal <100    Diastolic dysfunction - Primary    Overview     6/27/22 Echo: EF 56-60%, grade I impaired relaxation           Relevant Orders    ECG 12 Lead       PLAN:  1.  Chronic diastolic heart failure:  NYHA II  Echo 6/2022 with normal LVEF, grade I diastolic dysfunction, normal RVSP  Continue working on weight loss, exercise, low-sodium diet, blood pressure control.  If he begins to have any signs of heart failure such as increased dyspnea or lower extremity edema, I would consider adding empagliflozin 10 mg daily  Currently euvolemic and stable   Low risk for prostate ablation procedure from cardiac standpoint and may proceed without further testing as he is asymptomatic without angina or overt heart failure symptoms      2.  Hypertension:  Goal less than 130/80  At target, continue HCTZ and Benicar      3.  Hyperlipidemia:  Agree with atorvastatin given family history of CAD  Goal LDL less than 100  Recent lipid panel reviewed, LDL 70 at target     4. DM2  A1C 6.9% on recent labs, followed by PCP   Continue working on diet and weight loss  Recommend SGLT2 inhibitor if medical therapy needed for cardiovascular benefits as well   He is seeing his PCP later this afternoon     5. Obesity, BMI 38  Complicating all aspects of care   Has gained about 25 lbs since last year  Discussed diet- low carb, high protein and recommend increased exercise   He is aware and plans to increase activity once recovered from prostate ablation       Advance Care Planning   ACP discussion was held with the patient during this visit. Patient does not have an advance directive, declines further assistance.         Follow-up   Return in about 1 year (around 9/14/2024) for with RDS.      Electronically signed by Leonor Mcintyre PA-C, 09/14/23, 9:31 AM EDT.       1

## 2023-09-14 ENCOUNTER — OFFICE VISIT (OUTPATIENT)
Dept: CARDIOLOGY | Facility: CLINIC | Age: 73
End: 2023-09-14
Payer: MEDICARE

## 2023-09-14 ENCOUNTER — LAB (OUTPATIENT)
Dept: LAB | Facility: HOSPITAL | Age: 73
End: 2023-09-14
Payer: MEDICARE

## 2023-09-14 ENCOUNTER — OFFICE VISIT (OUTPATIENT)
Dept: FAMILY MEDICINE CLINIC | Facility: CLINIC | Age: 73
End: 2023-09-14
Payer: MEDICARE

## 2023-09-14 VITALS
HEIGHT: 71 IN | DIASTOLIC BLOOD PRESSURE: 70 MMHG | WEIGHT: 273.2 LBS | SYSTOLIC BLOOD PRESSURE: 120 MMHG | OXYGEN SATURATION: 99 % | BODY MASS INDEX: 38.25 KG/M2 | HEART RATE: 60 BPM

## 2023-09-14 VITALS
SYSTOLIC BLOOD PRESSURE: 130 MMHG | HEIGHT: 71 IN | DIASTOLIC BLOOD PRESSURE: 80 MMHG | HEART RATE: 75 BPM | OXYGEN SATURATION: 97 % | TEMPERATURE: 98.6 F | BODY MASS INDEX: 38.08 KG/M2 | WEIGHT: 272 LBS | RESPIRATION RATE: 27 BRPM

## 2023-09-14 DIAGNOSIS — R79.9 ABNORMAL FINDING OF BLOOD CHEMISTRY, UNSPECIFIED: ICD-10-CM

## 2023-09-14 DIAGNOSIS — E11.65 TYPE 2 DIABETES MELLITUS WITH HYPERGLYCEMIA, UNSPECIFIED WHETHER LONG TERM INSULIN USE: ICD-10-CM

## 2023-09-14 DIAGNOSIS — I10 ESSENTIAL HYPERTENSION: Primary | ICD-10-CM

## 2023-09-14 DIAGNOSIS — I10 ESSENTIAL HYPERTENSION: ICD-10-CM

## 2023-09-14 DIAGNOSIS — E78.5 HYPERLIPIDEMIA LDL GOAL <100: ICD-10-CM

## 2023-09-14 DIAGNOSIS — N13.8 BPH WITH OBSTRUCTION/LOWER URINARY TRACT SYMPTOMS: ICD-10-CM

## 2023-09-14 DIAGNOSIS — N40.1 BPH WITH OBSTRUCTION/LOWER URINARY TRACT SYMPTOMS: ICD-10-CM

## 2023-09-14 DIAGNOSIS — I51.89 DIASTOLIC DYSFUNCTION: Primary | ICD-10-CM

## 2023-09-14 LAB
ALBUMIN SERPL-MCNC: 4 G/DL (ref 3.5–5.2)
ALBUMIN/GLOB SERPL: 1.3 G/DL
ALP SERPL-CCNC: 65 U/L (ref 39–117)
ALT SERPL W P-5'-P-CCNC: 38 U/L (ref 1–41)
ANION GAP SERPL CALCULATED.3IONS-SCNC: 13.2 MMOL/L (ref 5–15)
AST SERPL-CCNC: 24 U/L (ref 1–40)
BILIRUB SERPL-MCNC: 0.4 MG/DL (ref 0–1.2)
BILIRUB UR QL STRIP: NEGATIVE
BUN SERPL-MCNC: 10 MG/DL (ref 8–23)
BUN/CREAT SERPL: 9.7 (ref 7–25)
CALCIUM SPEC-SCNC: 9.4 MG/DL (ref 8.6–10.5)
CHLORIDE SERPL-SCNC: 101 MMOL/L (ref 98–107)
CHOLEST SERPL-MCNC: 129 MG/DL (ref 0–200)
CLARITY UR: CLEAR
CO2 SERPL-SCNC: 24.8 MMOL/L (ref 22–29)
COLOR UR: YELLOW
CREAT SERPL-MCNC: 1.03 MG/DL (ref 0.76–1.27)
DEPRECATED RDW RBC AUTO: 46.9 FL (ref 37–54)
EGFRCR SERPLBLD CKD-EPI 2021: 76.7 ML/MIN/1.73
ERYTHROCYTE [DISTWIDTH] IN BLOOD BY AUTOMATED COUNT: 14.2 % (ref 12.3–15.4)
GLOBULIN UR ELPH-MCNC: 3.1 GM/DL
GLUCOSE SERPL-MCNC: 91 MG/DL (ref 65–99)
GLUCOSE UR STRIP-MCNC: NEGATIVE MG/DL
HCT VFR BLD AUTO: 48.7 % (ref 37.5–51)
HDLC SERPL-MCNC: 38 MG/DL (ref 40–60)
HGB BLD-MCNC: 17.2 G/DL (ref 13–17.7)
HGB UR QL STRIP.AUTO: NEGATIVE
KETONES UR QL STRIP: ABNORMAL
LDLC SERPL CALC-MCNC: 68 MG/DL (ref 0–100)
LDLC/HDLC SERPL: 1.7 {RATIO}
LEUKOCYTE ESTERASE UR QL STRIP.AUTO: ABNORMAL
MCH RBC QN AUTO: 32.1 PG (ref 26.6–33)
MCHC RBC AUTO-ENTMCNC: 35.3 G/DL (ref 31.5–35.7)
MCV RBC AUTO: 91 FL (ref 79–97)
NITRITE UR QL STRIP: NEGATIVE
PH UR STRIP.AUTO: 5.5 [PH] (ref 5–8)
PLATELET # BLD AUTO: 291 10*3/MM3 (ref 140–450)
PMV BLD AUTO: 9.5 FL (ref 6–12)
POTASSIUM SERPL-SCNC: 3.6 MMOL/L (ref 3.5–5.2)
PROT SERPL-MCNC: 7.1 G/DL (ref 6–8.5)
PROT UR QL STRIP: NEGATIVE
RBC # BLD AUTO: 5.35 10*6/MM3 (ref 4.14–5.8)
SODIUM SERPL-SCNC: 139 MMOL/L (ref 136–145)
SP GR UR STRIP: 1.02 (ref 1–1.03)
TRIGL SERPL-MCNC: 132 MG/DL (ref 0–150)
UROBILINOGEN UR QL STRIP: ABNORMAL
VLDLC SERPL-MCNC: 23 MG/DL (ref 5–40)
WBC NRBC COR # BLD: 13.87 10*3/MM3 (ref 3.4–10.8)

## 2023-09-14 PROCEDURE — 84443 ASSAY THYROID STIM HORMONE: CPT

## 2023-09-14 PROCEDURE — 83036 HEMOGLOBIN GLYCOSYLATED A1C: CPT

## 2023-09-14 PROCEDURE — 3075F SYST BP GE 130 - 139MM HG: CPT | Performed by: STUDENT IN AN ORGANIZED HEALTH CARE EDUCATION/TRAINING PROGRAM

## 2023-09-14 PROCEDURE — 80053 COMPREHEN METABOLIC PANEL: CPT

## 2023-09-14 PROCEDURE — 80061 LIPID PANEL: CPT

## 2023-09-14 PROCEDURE — 3044F HG A1C LEVEL LT 7.0%: CPT | Performed by: STUDENT IN AN ORGANIZED HEALTH CARE EDUCATION/TRAINING PROGRAM

## 2023-09-14 PROCEDURE — 82570 ASSAY OF URINE CREATININE: CPT

## 2023-09-14 PROCEDURE — 1170F FXNL STATUS ASSESSED: CPT | Performed by: STUDENT IN AN ORGANIZED HEALTH CARE EDUCATION/TRAINING PROGRAM

## 2023-09-14 PROCEDURE — 82306 VITAMIN D 25 HYDROXY: CPT

## 2023-09-14 PROCEDURE — 36415 COLL VENOUS BLD VENIPUNCTURE: CPT

## 2023-09-14 PROCEDURE — 82043 UR ALBUMIN QUANTITATIVE: CPT

## 2023-09-14 PROCEDURE — 3079F DIAST BP 80-89 MM HG: CPT | Performed by: STUDENT IN AN ORGANIZED HEALTH CARE EDUCATION/TRAINING PROGRAM

## 2023-09-14 PROCEDURE — G0439 PPPS, SUBSEQ VISIT: HCPCS | Performed by: STUDENT IN AN ORGANIZED HEALTH CARE EDUCATION/TRAINING PROGRAM

## 2023-09-14 PROCEDURE — 82607 VITAMIN B-12: CPT

## 2023-09-14 PROCEDURE — 85027 COMPLETE CBC AUTOMATED: CPT

## 2023-09-14 PROCEDURE — 81003 URINALYSIS AUTO W/O SCOPE: CPT

## 2023-09-14 RX ORDER — OLMESARTAN MEDOXOMIL 40 MG/1
40 TABLET ORAL DAILY
Qty: 90 TABLET | Refills: 1 | Status: SHIPPED | OUTPATIENT
Start: 2023-09-14

## 2023-09-14 RX ORDER — ATORVASTATIN CALCIUM 20 MG/1
20 TABLET, FILM COATED ORAL DAILY
Qty: 90 TABLET | Refills: 1 | Status: SHIPPED | OUTPATIENT
Start: 2023-09-14

## 2023-09-14 NOTE — PROGRESS NOTES
The ABCs of the Annual Wellness Visit  Subsequent Medicare Wellness Visit    Subjective    Justus Bishop is a 73 y.o. male who presents for a Subsequent Medicare Wellness Visit.    The following portions of the patient's history were reviewed and   updated as appropriate: allergies, current medications, past family history, past medical history, past social history, past surgical history, and problem list.    Compared to one year ago, the patient feels his physical   health is the same.    Compared to one year ago, the patient feels his mental   health is the same.    Recent Hospitalizations:  He was not admitted to the hospital during the last year.       Current Medical Providers:  Patient Care Team:  Misty Mai DO as PCP - General (Family Medicine)  Hector Silva MD as Consulting Physician (Hematology and Oncology)  Viraj Owusu MD as Consulting Physician (Cardiology)  Leonor Mcintyre PA-C as Physician Assistant (Cardiology)    Outpatient Medications Prior to Visit   Medication Sig Dispense Refill    albuterol sulfate  (90 Base) MCG/ACT inhaler Inhale 2 puffs Every 4 (Four) Hours As Needed for Wheezing. 1 g 0    Breo Ellipta 200-25 MCG/ACT inhaler INHALE ONE PUFF BY MOUTH DAILY 60 each 2    finasteride (PROSCAR) 5 MG tablet Take 1 tablet by mouth Daily. 90 tablet 3    hydroCHLOROthiazide (HYDRODIURIL) 25 MG tablet TAKE ONE TABLET BY MOUTH DAILY 90 tablet 0    mupirocin (BACTROBAN) 2 % ointment APPLY TO AFFECTED AREA(S) TWO TIMES A DAY 22 g 1    tamsulosin (FLOMAX) 0.4 MG capsule 24 hr capsule Take 1 capsule by mouth Daily. 90 capsule 3    atorvastatin (LIPITOR) 20 MG tablet TAKE ONE TABLET BY MOUTH DAILY 90 tablet 1    olmesartan (BENICAR) 40 MG tablet TAKE ONE TABLET BY MOUTH DAILY 90 tablet 1     No facility-administered medications prior to visit.       No opioid medication identified on active medication list. I have reviewed chart for other potential  high risk  "medication/s and harmful drug interactions in the elderly.            Patient Active Problem List   Diagnosis    Essential hypertension    Hyperlipidemia LDL goal <100    Leukocytosis    Wellness examination    Diastolic dysfunction    Dizziness    Family history of ASCVD    BPH with obstruction/lower urinary tract symptoms     Advance Care Planning   Advance Care Planning     Advance Directive is not on file.  ACP discussion was held with the patient during this visit. Patient does not have an advance directive, information provided.     Objective    Vitals:    23 1443   BP: 130/80   Pulse: 75   Resp: 27   Temp: 98.6 °F (37 °C)   SpO2: 97%   Weight: 123 kg (272 lb)   Height: 180.3 cm (71\")   PainSc: 0-No pain     Estimated body mass index is 37.94 kg/m² as calculated from the following:    Height as of this encounter: 180.3 cm (71\").    Weight as of this encounter: 123 kg (272 lb).           Does the patient have evidence of cognitive impairment? No          HEALTH RISK ASSESSMENT    Smoking Status:  Social History     Tobacco Use   Smoking Status Former    Packs/day: 0.50    Years: 5.00    Pack years: 2.50    Types: Cigarettes    Start date: 1970    Quit date: 1975    Years since quittin.7    Passive exposure: Past   Smokeless Tobacco Never     Alcohol Consumption:  Social History     Substance and Sexual Activity   Alcohol Use Not Currently    Comment: 0-3x/daily; <6x/week     Fall Risk Screen:    NICKO Fall Risk Assessment was completed, and patient is at LOW risk for falls.Assessment completed on:2023    Depression Screenin/14/2023     2:48 PM   PHQ-2/PHQ-9 Depression Screening   Little Interest or Pleasure in Doing Things 0-->not at all   Feeling Down, Depressed or Hopeless 0-->not at all   PHQ-9: Brief Depression Severity Measure Score 0       Health Habits and Functional and Cognitive Screenin/14/2023     2:49 PM   Functional & Cognitive Status   Do you have " difficulty preparing food and eating? No   Do you have difficulty bathing yourself, getting dressed or grooming yourself? No   Do you have difficulty using the toilet? No   Do you have difficulty moving around from place to place? No   Do you have trouble with steps or getting out of a bed or a chair? No   Current Diet Unhealthy Diet   Dental Exam Not up to date   Eye Exam Not up to date   Exercise (times per week) 0 times per week   Current Exercises Include No Regular Exercise   Do you need help using the phone?  No   Are you deaf or do you have serious difficulty hearing?  Yes   Do you need help to go to places out of walking distance? No   Do you need help shopping? No   Do you need help preparing meals?  No   Do you need help with housework?  No   Do you need help with laundry? No   Do you need help taking your medications? No   Do you need help managing money? No   Do you ever drive or ride in a car without wearing a seat belt? No       Age-appropriate Screening Schedule:  Refer to the list below for future screening recommendations based on patient's age, sex and/or medical conditions. Orders for these recommended tests are listed in the plan section. The patient has been provided with a written plan.    Health Maintenance   Topic Date Due    COLORECTAL CANCER SCREENING  Never done    TDAP/TD VACCINES (1 - Tdap) Never done    DIABETIC FOOT EXAM  Never done    DIABETIC EYE EXAM  Never done    COVID-19 Vaccine (1) 06/04/2022    ZOSTER VACCINE (2 of 2) 04/05/2023    HEMOGLOBIN A1C  08/08/2023    URINE MICROALBUMIN  08/15/2023    INFLUENZA VACCINE  10/01/2023    LIPID PANEL  02/08/2024    BMI FOLLOWUP  02/22/2024    ANNUAL WELLNESS VISIT  09/14/2024    HEPATITIS C SCREENING  Completed    Pneumococcal Vaccine 65+  Completed    AAA SCREEN (ONE-TIME)  Completed                  CMS Preventative Services Quick Reference  Risk Factors Identified During Encounter  vaccine  The above risks/problems have been discussed  "with the patient.  Pertinent information has been shared with the patient in the After Visit Summary.  An After Visit Summary and PPPS were made available to the patient.    Follow Up:   Next Medicare Wellness visit to be scheduled in 1 year.       Additional E&M Note during same encounter follows:  Patient has multiple medical problems which are significant and separately identifiable that require additional work above and beyond the Medicare Wellness Visit.      Chief Complaint  Medicare Wellness-subsequent    Subjective        HPI  Justus Bishop        Objective   Vital Signs:  /80   Pulse 75   Temp 98.6 °F (37 °C)   Resp 27   Ht 180.3 cm (71\")   Wt 123 kg (272 lb)   SpO2 97%   BMI 37.94 kg/m²     Physical Exam  Constitutional:       General: He is not in acute distress.     Appearance: Normal appearance.   HENT:      Head: Normocephalic and atraumatic.   Eyes:      Extraocular Movements: Extraocular movements intact.   Cardiovascular:      Rate and Rhythm: Normal rate and regular rhythm.      Heart sounds: No murmur heard.  Pulmonary:      Effort: Pulmonary effort is normal. No respiratory distress.      Breath sounds: Normal breath sounds. No stridor. No wheezing, rhonchi or rales.   Skin:     Findings: No rash.   Neurological:      General: No focal deficit present.      Mental Status: He is alert.   Psychiatric:         Mood and Affect: Mood normal.               Assessment and Plan   Diagnoses and all orders for this visit:    1. Essential hypertension (Primary)  -     CBC (No Diff); Future  -     Comprehensive Metabolic Panel; Future  -     Hemoglobin A1c; Future  -     Lipid Panel; Future  -     TSH Rfx On Abnormal To Free T4; Future  -     Vitamin B12; Future  -     Vitamin D,25-Hydroxy; Future    2. Hyperlipidemia LDL goal <100  -     CBC (No Diff); Future  -     Comprehensive Metabolic Panel; Future  -     Hemoglobin A1c; Future  -     Lipid Panel; Future  -     TSH Rfx On Abnormal " To Free T4; Future  -     Vitamin B12; Future  -     Vitamin D,25-Hydroxy; Future    3. Abnormal finding of blood chemistry, unspecified  -     Hemoglobin A1c; Future  -     Vitamin D,25-Hydroxy; Future    4. Body mass index (BMI) 30.0-30.9, adult  -     Vitamin D,25-Hydroxy; Future    5. Type 2 diabetes mellitus with hyperglycemia, unspecified whether long term insulin use  -     Microalbumin / Creatinine Urine Ratio - Urine, Clean Catch; Future  -     Ambulatory Referral to Ophthalmology    Other orders  -     atorvastatin (LIPITOR) 20 MG tablet; Take 1 tablet by mouth Daily.  Dispense: 90 tablet; Refill: 1  -     olmesartan (BENICAR) 40 MG tablet; Take 1 tablet by mouth Daily.  Dispense: 90 tablet; Refill: 1           Annual exam  Colonoscopy: recommended to screen for colon cancer. He declines at this time.   Aaa: he will consider.   Psa followed by urology. He has procedure next week.   hiv hep c screening: he declines   Vaccines recommended:  covid vaccine  Shingles  Pneumonia  Tdap  He wants to avoid vaccines at this time.         Follow Up   Return in about 6 months (around 3/14/2024).  Patient was given instructions and counseling regarding his condition or for health maintenance advice. Please see specific information pulled into the AVS if appropriate.

## 2023-09-14 NOTE — LETTER
2023     Rob Escobar MD  [address]  [address]  [facility] [address]    Patient: Justus Bishop   YOB: 1950   Date of Visit: 2023     Dear Rob Escobar MD:       Thank you for referring Justus Bishop to me for evaluation. Below are the relevant portions of my assessment and plan of care.    If you have questions, please do not hesitate to call me. I look forward to following Justus along with you.         Sincerely,        Leonor Mcintyre PA-C        CC: No Recipients    Leonor Mcintyre PA-C  23 0936  Sign when Signing Visit  OFFICE VISIT  NOTE  [facility] CARDIOLOGY      Name: Justus Bishop    Date: 2023  MRN:  [id number]  :  1950      REFERRING/PRIMARY PROVIDER:  Misty Mai DO     Chief Complaint   Patient presents with   • Follow-up diastolic heart failure   Cardiac clearance        HPI: Justus Bishop is a 73 y.o. male who presents today for follow up of diastolic dysfunction and cardiac clearance for upcoming cystoscopy/prostate ablation.  Echo 2022 showed normal ejection fraction with grade 1 diastolic dysfunction.  He moved from [place] to [place],  had a rough year last year with a divorce and some depression, and has gained some weight.  Reports some stable shortness of breath but likely from asthma.  Denies chest pain.  Cardiac work-up in [place] in 2020 include an echo which showed normal ejection fraction grade 1 diastolic dysfunction, normal event monitor, and relatively benign coronary calcium scan with a score of 13. Since last visit, he has gained about 25 lbs, recent A1C 6.9%. He is planning to start exercising at [employer] once recovered from his prostate ablation. He admits to dietary indiscretions now and sedentary lifestyle. He denies any chest pain to suggest angina, and no MORAN or heart failure symptoms.      ROS:Pertinent positives as  listed in the HPI.  All other systems reviewed and negative.    Past Medical History:   Diagnosis Date   • Ankle sprain 1985   • Anxiety 2019   • Asthma 2016   • Benign prostatic hyperplasia 2017   • BPH with obstruction/lower urinary tract symptoms 2023   • Cataract 2006    Repaired   • Clotting disorder 2020    Intermittent (2 times, once recent)   • COPD (chronic obstructive pulmonary disease)    • Depression 2019   • Diabetes mellitus 23   • Frozen shoulder    • Hip arthrosis 2019   • HL (hearing loss)    • Hormone disorder 2018    Low Testosterone   • Hypertension 2018   • Knee swelling    • Periarthritis of shoulder    • Rotator cuff syndrome    • Substance abuse 2019    resolved   • Tear of meniscus of knee    • Tendinitis of knee    • Tremor        Past Surgical History:   Procedure Laterality Date   • CARDIAC CATHETERIZATION      clear   • EYE SURGERY      cataract   • KNEE SURGERY         Social History     Socioeconomic History   • Marital status:    Tobacco Use   • Smoking status: Former     Packs/day: 0.50     Years: 5.00     Pack years: 2.50     Types: Cigarettes     Start date: 1970     Quit date: 1975     Years since quittin.7     Passive exposure: Past   • Smokeless tobacco: Never   Vaping Use   • Vaping Use: Never used   Substance and Sexual Activity   • Alcohol use: Not Currently     Comment: 0-3x/daily; <6x/week   • Drug use: Not Currently     Types: Marijuana     Comment: 0-3x/daily; 15x/monthly for 3 yrs prior to quitting in    • Sexual activity: Not Currently     Partners: Female       Family History   Problem Relation Age of Onset   • Depression Mother    • Kidney disease Mother    • Kidney cancer Mother    • Cancer Father         Multiple Myeloma   • Early death Father    • Heart disease Father    • Hypertension Sister    • Heart disease Sister    • Pulmonary fibrosis Sister         No Known Allergies    Current  "Outpatient Medications   Medication Instructions   • albuterol sulfate  (90 Base) MCG/ACT inhaler 2 puffs, Inhalation, Every 4 Hours PRN   • atorvastatin (LIPITOR) 20 MG tablet TAKE ONE TABLET BY MOUTH DAILY   • Breo Ellipta 200-25 MCG/ACT inhaler INHALE ONE PUFF BY MOUTH DAILY   • finasteride (PROSCAR) 5 mg, Oral, Daily   • hydroCHLOROthiazide (HYDRODIURIL) 25 MG tablet TAKE ONE TABLET BY MOUTH DAILY   • mupirocin (BACTROBAN) 2 % ointment APPLY TO AFFECTED AREA(S) TWO TIMES A DAY   • olmesartan (BENICAR) 40 MG tablet TAKE ONE TABLET BY MOUTH DAILY   • tamsulosin (FLOMAX) 0.4 mg, Oral, Daily       Vitals:    09/14/23 0908   BP: 120/70   BP Location: Right arm   Patient Position: Sitting   Cuff Size: Adult   Pulse: 60   SpO2: 99%   Weight: 124 kg (273 lb 3.2 oz)   Height: 180.3 cm (71\")     Body mass index is 38.1 kg/m².    PHYSICAL EXAM:    General Appearance:   well developed  well nourished  Neck:  thyroid not enlarged  supple  Respiratory:  no respiratory distress  normal breath sounds  no rales  Cardiovascular:  no jugular venous distention  regular rhythm  apical impulse normal  S1 normal, S2 normal  no S3, no S4   no murmur  no rub, no thrill  lower extremity edema: none    Skin:   warm, dry    RESULTS:     ECG 12 Lead    Date/Time: 9/14/2023 9:15 AM  Performed by: Leonor Mcintyre PA-C  Authorized by: Leonor Mcintyre PA-C   Comparison: compared with previous ECG from 9/8/2022  Similar to previous ECG  Rhythm: sinus rhythm  Rate: normal  BPM: 83  Conduction: left anterior fascicular block    Clinical impression: abnormal EKG        Results for orders placed during the hospital encounter of 06/27/22    Adult Transthoracic Echo Complete W/ Cont if Necessary Per Protocol    Interpretation Summary  · Left ventricular ejection fraction appears to be 56 - 60%. Left ventricular systolic function is normal.  · Left ventricular diastolic function is consistent with (grade I) impaired " relaxation.  · Estimated right ventricular systolic pressure from tricuspid regurgitation is normal (<35 mmHg). Calculated right ventricular systolic pressure from tricuspid regurgitation is 23 mmHg.      Labs:  Lab Results   Component Value Date    CHOL 145 02/08/2023    TRIG 182 (H) 02/08/2023    HDL 44 02/08/2023    LDL 70 02/08/2023    AST 14 02/08/2023    ALT 28 02/08/2023     Lab Results   Component Value Date    HGBA1C 6.90 (H) 02/08/2023     Creatinine   Date Value Ref Range Status   02/08/2023 1.11 0.76 - 1.27 mg/dL Final   07/21/2022 1.05 0.76 - 1.27 mg/dL Final   06/13/2022 0.96 0.76 - 1.27 mg/dL Final     eGFR Non  Am   Date Value Ref Range Status   01/26/2022 85 >60 mL/min/1.73 Final     Comment:     The MDRD GFR formula is only valid for adults with stable  renal function between ages 18 and 70.         ASSESSMENT:  Problem List Items Addressed This Visit          Cardiac and Vasculature    Essential hypertension    Relevant Orders    ECG 12 Lead    Hyperlipidemia LDL goal <100    Diastolic dysfunction - Primary    Overview     6/27/22 Echo: EF 56-60%, grade I impaired relaxation           Relevant Orders    ECG 12 Lead       PLAN:  1.  Chronic diastolic heart failure:  NYHA II  Echo 6/2022 with normal LVEF, grade I diastolic dysfunction, normal RVSP  Continue working on weight loss, exercise, low-sodium diet, blood pressure control.  If he begins to have any signs of heart failure such as increased dyspnea or lower extremity edema, I would consider adding empagliflozin 10 mg daily  Currently euvolemic and stable   Low risk for prostate ablation procedure from cardiac standpoint and may proceed without further testing as he is asymptomatic without angina or overt heart failure symptoms      2.  Hypertension:  Goal less than 130/80  At target, continue HCTZ and Benicar      3.  Hyperlipidemia:  Agree with atorvastatin given family history of CAD  Goal LDL less than 100  Recent lipid panel  reviewed, LDL 70 at target     4. DM2  A1C 6.9% on recent labs, followed by PCP   Continue working on diet and weight loss  Recommend SGLT2 inhibitor if medical therapy needed for cardiovascular benefits as well   He is seeing his PCP later this afternoon     5. Obesity, BMI 38  Complicating all aspects of care   Has gained about 25 lbs since last year  Discussed diet- low carb, high protein and recommend increased exercise   He is aware and plans to increase activity once recovered from prostate ablation       Advance Care Planning   ACP discussion was held with the patient during this visit. Patient does not have an advance directive, declines further assistance.        Follow-up   Return in about 1 year (around 9/14/2024) for with RDS.      Electronically signed by Leonor Mcintyre PA-C, 09/14/23, 9:31 AM EDT.

## 2023-09-15 LAB
25(OH)D3 SERPL-MCNC: 14.7 NG/ML (ref 30–100)
ALBUMIN UR-MCNC: 1.5 MG/DL
CREAT UR-MCNC: 196.6 MG/DL
HBA1C MFR BLD: 7.1 % (ref 4.8–5.6)
MICROALBUMIN/CREAT UR: 7.6 MG/G
TSH SERPL DL<=0.05 MIU/L-ACNC: 2.59 UIU/ML (ref 0.27–4.2)
VIT B12 BLD-MCNC: 313 PG/ML (ref 211–946)

## 2023-09-19 ENCOUNTER — ANESTHESIA EVENT (OUTPATIENT)
Dept: PERIOP | Facility: HOSPITAL | Age: 73
End: 2023-09-19
Payer: MEDICARE

## 2023-09-19 RX ORDER — SODIUM CHLORIDE 0.9 % (FLUSH) 0.9 %
10 SYRINGE (ML) INJECTION EVERY 12 HOURS SCHEDULED
Status: CANCELLED | OUTPATIENT
Start: 2023-09-19

## 2023-09-19 RX ORDER — SODIUM CHLORIDE 9 MG/ML
40 INJECTION, SOLUTION INTRAVENOUS AS NEEDED
Status: CANCELLED | OUTPATIENT
Start: 2023-09-19

## 2023-09-19 RX ORDER — FAMOTIDINE 10 MG/ML
20 INJECTION, SOLUTION INTRAVENOUS ONCE
Status: CANCELLED | OUTPATIENT
Start: 2023-09-19 | End: 2023-09-19

## 2023-09-19 RX ORDER — SODIUM CHLORIDE 0.9 % (FLUSH) 0.9 %
10 SYRINGE (ML) INJECTION AS NEEDED
Status: CANCELLED | OUTPATIENT
Start: 2023-09-19

## 2023-09-20 ENCOUNTER — ANESTHESIA (OUTPATIENT)
Dept: PERIOP | Facility: HOSPITAL | Age: 73
End: 2023-09-20
Payer: MEDICARE

## 2023-09-20 ENCOUNTER — HOSPITAL ENCOUNTER (OUTPATIENT)
Facility: HOSPITAL | Age: 73
Discharge: HOME | End: 2023-09-21
Attending: STUDENT IN AN ORGANIZED HEALTH CARE EDUCATION/TRAINING PROGRAM | Admitting: STUDENT IN AN ORGANIZED HEALTH CARE EDUCATION/TRAINING PROGRAM
Payer: MEDICARE

## 2023-09-20 DIAGNOSIS — N13.8 BPH WITH OBSTRUCTION/LOWER URINARY TRACT SYMPTOMS: ICD-10-CM

## 2023-09-20 DIAGNOSIS — N40.1 BPH WITH OBSTRUCTION/LOWER URINARY TRACT SYMPTOMS: ICD-10-CM

## 2023-09-20 PROBLEM — N40.0 BPH (BENIGN PROSTATIC HYPERPLASIA): Status: ACTIVE | Noted: 2023-09-20

## 2023-09-20 PROBLEM — Z98.890 S/P CYSTOSCOPY: Status: ACTIVE | Noted: 2023-09-20

## 2023-09-20 PROBLEM — R73.09 ELEVATED HEMOGLOBIN A1C: Status: ACTIVE | Noted: 2023-09-20

## 2023-09-20 LAB
ANION GAP SERPL CALCULATED.3IONS-SCNC: 13 MMOL/L (ref 5–15)
BUN SERPL-MCNC: 14 MG/DL (ref 8–23)
BUN/CREAT SERPL: 12.7 (ref 7–25)
CALCIUM SPEC-SCNC: 8.8 MG/DL (ref 8.6–10.5)
CHLORIDE SERPL-SCNC: 100 MMOL/L (ref 98–107)
CO2 SERPL-SCNC: 24 MMOL/L (ref 22–29)
CREAT SERPL-MCNC: 1.1 MG/DL (ref 0.76–1.27)
DEPRECATED RDW RBC AUTO: 47.6 FL (ref 37–54)
EGFRCR SERPLBLD CKD-EPI 2021: 70.9 ML/MIN/1.73
ERYTHROCYTE [DISTWIDTH] IN BLOOD BY AUTOMATED COUNT: 14.2 % (ref 12.3–15.4)
GLUCOSE BLDC GLUCOMTR-MCNC: 132 MG/DL (ref 70–130)
GLUCOSE BLDC GLUCOMTR-MCNC: 163 MG/DL (ref 70–130)
GLUCOSE SERPL-MCNC: 139 MG/DL (ref 65–99)
HCT VFR BLD AUTO: 47.4 % (ref 37.5–51)
HGB BLD-MCNC: 16.5 G/DL (ref 13–17.7)
MCH RBC QN AUTO: 32 PG (ref 26.6–33)
MCHC RBC AUTO-ENTMCNC: 34.8 G/DL (ref 31.5–35.7)
MCV RBC AUTO: 91.9 FL (ref 79–97)
PLATELET # BLD AUTO: 268 10*3/MM3 (ref 140–450)
PMV BLD AUTO: 8.7 FL (ref 6–12)
POTASSIUM SERPL-SCNC: 3.6 MMOL/L (ref 3.5–5.2)
RBC # BLD AUTO: 5.16 10*6/MM3 (ref 4.14–5.8)
SODIUM SERPL-SCNC: 137 MMOL/L (ref 136–145)
WBC NRBC COR # BLD: 13.86 10*3/MM3 (ref 3.4–10.8)

## 2023-09-20 PROCEDURE — 63710000001 OXYBUTYNIN XL 10 MG TABLET SUSTAINED-RELEASE 24 HOUR: Performed by: STUDENT IN AN ORGANIZED HEALTH CARE EDUCATION/TRAINING PROGRAM

## 2023-09-20 PROCEDURE — 63710000001 OXYCODONE 5 MG TABLET: Performed by: STUDENT IN AN ORGANIZED HEALTH CARE EDUCATION/TRAINING PROGRAM

## 2023-09-20 PROCEDURE — 82948 REAGENT STRIP/BLOOD GLUCOSE: CPT

## 2023-09-20 PROCEDURE — 63710000001 LOSARTAN 50 MG TABLET: Performed by: STUDENT IN AN ORGANIZED HEALTH CARE EDUCATION/TRAINING PROGRAM

## 2023-09-20 PROCEDURE — A9270 NON-COVERED ITEM OR SERVICE: HCPCS | Performed by: STUDENT IN AN ORGANIZED HEALTH CARE EDUCATION/TRAINING PROGRAM

## 2023-09-20 PROCEDURE — G0378 HOSPITAL OBSERVATION PER HR: HCPCS

## 2023-09-20 PROCEDURE — A9270 NON-COVERED ITEM OR SERVICE: HCPCS | Performed by: ANESTHESIOLOGY

## 2023-09-20 PROCEDURE — 85027 COMPLETE CBC AUTOMATED: CPT | Performed by: STUDENT IN AN ORGANIZED HEALTH CARE EDUCATION/TRAINING PROGRAM

## 2023-09-20 PROCEDURE — 25010000002 PROPOFOL 10 MG/ML EMULSION: Performed by: NURSE ANESTHETIST, CERTIFIED REGISTERED

## 2023-09-20 PROCEDURE — 63710000001 FAMOTIDINE 20 MG TABLET: Performed by: ANESTHESIOLOGY

## 2023-09-20 PROCEDURE — 88305 TISSUE EXAM BY PATHOLOGIST: CPT | Performed by: STUDENT IN AN ORGANIZED HEALTH CARE EDUCATION/TRAINING PROGRAM

## 2023-09-20 PROCEDURE — C2596 PROBE, ROBOTIC, WATER-JET: HCPCS | Performed by: STUDENT IN AN ORGANIZED HEALTH CARE EDUCATION/TRAINING PROGRAM

## 2023-09-20 PROCEDURE — 25010000002 FENTANYL CITRATE (PF) 100 MCG/2ML SOLUTION: Performed by: NURSE ANESTHETIST, CERTIFIED REGISTERED

## 2023-09-20 PROCEDURE — 0421T PR TRANSURETHRAL WATERJET ABLATION PROSTATE COMPL: CPT | Performed by: STUDENT IN AN ORGANIZED HEALTH CARE EDUCATION/TRAINING PROGRAM

## 2023-09-20 PROCEDURE — 80048 BASIC METABOLIC PNL TOTAL CA: CPT | Performed by: STUDENT IN AN ORGANIZED HEALTH CARE EDUCATION/TRAINING PROGRAM

## 2023-09-20 PROCEDURE — 25010000002 SUGAMMADEX 200 MG/2ML SOLUTION: Performed by: ANESTHESIOLOGY

## 2023-09-20 PROCEDURE — 25010000002 DEXAMETHASONE PER 1 MG: Performed by: NURSE ANESTHETIST, CERTIFIED REGISTERED

## 2023-09-20 PROCEDURE — A9270 NON-COVERED ITEM OR SERVICE: HCPCS | Performed by: NURSE PRACTITIONER

## 2023-09-20 PROCEDURE — 63710000001 INSULIN LISPRO (HUMAN) PER 5 UNITS: Performed by: NURSE PRACTITIONER

## 2023-09-20 PROCEDURE — 25010000002 ONDANSETRON PER 1 MG: Performed by: ANESTHESIOLOGY

## 2023-09-20 PROCEDURE — 25010000002 CEFAZOLIN PER 500 MG: Performed by: STUDENT IN AN ORGANIZED HEALTH CARE EDUCATION/TRAINING PROGRAM

## 2023-09-20 RX ORDER — LABETALOL HYDROCHLORIDE 5 MG/ML
5 INJECTION, SOLUTION INTRAVENOUS
Status: DISCONTINUED | OUTPATIENT
Start: 2023-09-20 | End: 2023-09-20 | Stop reason: HOSPADM

## 2023-09-20 RX ORDER — MAGNESIUM HYDROXIDE 1200 MG/15ML
LIQUID ORAL AS NEEDED
Status: DISCONTINUED | OUTPATIENT
Start: 2023-09-20 | End: 2023-09-20 | Stop reason: HOSPADM

## 2023-09-20 RX ORDER — OXYCODONE HYDROCHLORIDE 5 MG/1
5 TABLET ORAL EVERY 4 HOURS PRN
Status: DISCONTINUED | OUTPATIENT
Start: 2023-09-20 | End: 2023-09-21 | Stop reason: HOSPADM

## 2023-09-20 RX ORDER — SODIUM CHLORIDE 9 MG/ML
100 INJECTION, SOLUTION INTRAVENOUS CONTINUOUS
Status: DISCONTINUED | OUTPATIENT
Start: 2023-09-20 | End: 2023-09-21 | Stop reason: HOSPADM

## 2023-09-20 RX ORDER — IBUPROFEN 600 MG/1
1 TABLET ORAL
Status: DISCONTINUED | OUTPATIENT
Start: 2023-09-20 | End: 2023-09-21 | Stop reason: HOSPADM

## 2023-09-20 RX ORDER — POLYETHYLENE GLYCOL 3350 17 G/17G
17 POWDER, FOR SOLUTION ORAL DAILY
Status: DISCONTINUED | OUTPATIENT
Start: 2023-09-20 | End: 2023-09-21 | Stop reason: HOSPADM

## 2023-09-20 RX ORDER — DEXAMETHASONE SODIUM PHOSPHATE 4 MG/ML
INJECTION, SOLUTION INTRA-ARTICULAR; INTRALESIONAL; INTRAMUSCULAR; INTRAVENOUS; SOFT TISSUE AS NEEDED
Status: DISCONTINUED | OUTPATIENT
Start: 2023-09-20 | End: 2023-09-20 | Stop reason: SURG

## 2023-09-20 RX ORDER — FINASTERIDE 5 MG/1
5 TABLET, FILM COATED ORAL DAILY
Status: DISCONTINUED | OUTPATIENT
Start: 2023-09-21 | End: 2023-09-21 | Stop reason: HOSPADM

## 2023-09-20 RX ORDER — NALOXONE HCL 0.4 MG/ML
0.4 VIAL (ML) INJECTION AS NEEDED
Status: DISCONTINUED | OUTPATIENT
Start: 2023-09-20 | End: 2023-09-20 | Stop reason: HOSPADM

## 2023-09-20 RX ORDER — FAMOTIDINE 20 MG/1
20 TABLET, FILM COATED ORAL ONCE
Status: COMPLETED | OUTPATIENT
Start: 2023-09-20 | End: 2023-09-20

## 2023-09-20 RX ORDER — DROPERIDOL 2.5 MG/ML
0.62 INJECTION, SOLUTION INTRAMUSCULAR; INTRAVENOUS ONCE AS NEEDED
Status: DISCONTINUED | OUTPATIENT
Start: 2023-09-20 | End: 2023-09-20 | Stop reason: HOSPADM

## 2023-09-20 RX ORDER — CEFAZOLIN SODIUM 2 G/100ML
2000 INJECTION, SOLUTION INTRAVENOUS EVERY 8 HOURS
Status: COMPLETED | OUTPATIENT
Start: 2023-09-20 | End: 2023-09-21

## 2023-09-20 RX ORDER — ROCURONIUM BROMIDE 10 MG/ML
INJECTION, SOLUTION INTRAVENOUS AS NEEDED
Status: DISCONTINUED | OUTPATIENT
Start: 2023-09-20 | End: 2023-09-20 | Stop reason: SURG

## 2023-09-20 RX ORDER — FENTANYL CITRATE 50 UG/ML
50 INJECTION, SOLUTION INTRAMUSCULAR; INTRAVENOUS
Status: DISCONTINUED | OUTPATIENT
Start: 2023-09-20 | End: 2023-09-20 | Stop reason: HOSPADM

## 2023-09-20 RX ORDER — SODIUM CHLORIDE 0.9 % (FLUSH) 0.9 %
3 SYRINGE (ML) INJECTION EVERY 12 HOURS SCHEDULED
Status: DISCONTINUED | OUTPATIENT
Start: 2023-09-20 | End: 2023-09-20 | Stop reason: HOSPADM

## 2023-09-20 RX ORDER — SODIUM CHLORIDE 0.9 % (FLUSH) 0.9 %
3-10 SYRINGE (ML) INJECTION AS NEEDED
Status: DISCONTINUED | OUTPATIENT
Start: 2023-09-20 | End: 2023-09-20 | Stop reason: HOSPADM

## 2023-09-20 RX ORDER — HYDROCHLOROTHIAZIDE 25 MG/1
25 TABLET ORAL DAILY
Status: DISCONTINUED | OUTPATIENT
Start: 2023-09-21 | End: 2023-09-21 | Stop reason: HOSPADM

## 2023-09-20 RX ORDER — ONDANSETRON 2 MG/ML
4 INJECTION INTRAMUSCULAR; INTRAVENOUS ONCE AS NEEDED
Status: DISCONTINUED | OUTPATIENT
Start: 2023-09-20 | End: 2023-09-20 | Stop reason: HOSPADM

## 2023-09-20 RX ORDER — DEXTROSE MONOHYDRATE 25 G/50ML
25 INJECTION, SOLUTION INTRAVENOUS
Status: DISCONTINUED | OUTPATIENT
Start: 2023-09-20 | End: 2023-09-21 | Stop reason: HOSPADM

## 2023-09-20 RX ORDER — DROPERIDOL 2.5 MG/ML
0.62 INJECTION, SOLUTION INTRAMUSCULAR; INTRAVENOUS
Status: DISCONTINUED | OUTPATIENT
Start: 2023-09-20 | End: 2023-09-20 | Stop reason: HOSPADM

## 2023-09-20 RX ORDER — ATORVASTATIN CALCIUM 20 MG/1
20 TABLET, FILM COATED ORAL DAILY
Status: DISCONTINUED | OUTPATIENT
Start: 2023-09-21 | End: 2023-09-21 | Stop reason: HOSPADM

## 2023-09-20 RX ORDER — NICOTINE POLACRILEX 4 MG
15 LOZENGE BUCCAL
Status: DISCONTINUED | OUTPATIENT
Start: 2023-09-20 | End: 2023-09-21 | Stop reason: HOSPADM

## 2023-09-20 RX ORDER — PROMETHAZINE HYDROCHLORIDE 25 MG/1
25 SUPPOSITORY RECTAL ONCE AS NEEDED
Status: DISCONTINUED | OUTPATIENT
Start: 2023-09-20 | End: 2023-09-20 | Stop reason: HOSPADM

## 2023-09-20 RX ORDER — OXYBUTYNIN CHLORIDE 10 MG/1
10 TABLET, EXTENDED RELEASE ORAL DAILY
Status: DISCONTINUED | OUTPATIENT
Start: 2023-09-20 | End: 2023-09-21 | Stop reason: HOSPADM

## 2023-09-20 RX ORDER — MIDAZOLAM HYDROCHLORIDE 1 MG/ML
0.5 INJECTION INTRAMUSCULAR; INTRAVENOUS
Status: DISCONTINUED | OUTPATIENT
Start: 2023-09-20 | End: 2023-09-20 | Stop reason: HOSPADM

## 2023-09-20 RX ORDER — MEPERIDINE HYDROCHLORIDE 25 MG/ML
12.5 INJECTION INTRAMUSCULAR; INTRAVENOUS; SUBCUTANEOUS
Status: DISCONTINUED | OUTPATIENT
Start: 2023-09-20 | End: 2023-09-20 | Stop reason: HOSPADM

## 2023-09-20 RX ORDER — EPHEDRINE SULFATE 50 MG/ML
INJECTION INTRAVENOUS AS NEEDED
Status: DISCONTINUED | OUTPATIENT
Start: 2023-09-20 | End: 2023-09-20 | Stop reason: SURG

## 2023-09-20 RX ORDER — IPRATROPIUM BROMIDE AND ALBUTEROL SULFATE 2.5; .5 MG/3ML; MG/3ML
3 SOLUTION RESPIRATORY (INHALATION) ONCE AS NEEDED
Status: DISCONTINUED | OUTPATIENT
Start: 2023-09-20 | End: 2023-09-20 | Stop reason: HOSPADM

## 2023-09-20 RX ORDER — CEFAZOLIN SODIUM IN 0.9 % NACL 3 G/100 ML
3000 INTRAVENOUS SOLUTION, PIGGYBACK (ML) INTRAVENOUS ONCE
Status: COMPLETED | OUTPATIENT
Start: 2023-09-20 | End: 2023-09-20

## 2023-09-20 RX ORDER — PROMETHAZINE HYDROCHLORIDE 25 MG/1
25 TABLET ORAL ONCE AS NEEDED
Status: DISCONTINUED | OUTPATIENT
Start: 2023-09-20 | End: 2023-09-20 | Stop reason: HOSPADM

## 2023-09-20 RX ORDER — AMOXICILLIN 250 MG
2 CAPSULE ORAL 2 TIMES DAILY
Status: DISCONTINUED | OUTPATIENT
Start: 2023-09-20 | End: 2023-09-21 | Stop reason: HOSPADM

## 2023-09-20 RX ORDER — HYDROMORPHONE HYDROCHLORIDE 1 MG/ML
0.5 INJECTION, SOLUTION INTRAMUSCULAR; INTRAVENOUS; SUBCUTANEOUS
Status: DISCONTINUED | OUTPATIENT
Start: 2023-09-20 | End: 2023-09-21 | Stop reason: HOSPADM

## 2023-09-20 RX ORDER — ONDANSETRON 2 MG/ML
4 INJECTION INTRAMUSCULAR; INTRAVENOUS EVERY 6 HOURS PRN
Status: DISCONTINUED | OUTPATIENT
Start: 2023-09-20 | End: 2023-09-21 | Stop reason: HOSPADM

## 2023-09-20 RX ORDER — HYDROMORPHONE HYDROCHLORIDE 1 MG/ML
0.5 INJECTION, SOLUTION INTRAMUSCULAR; INTRAVENOUS; SUBCUTANEOUS
Status: DISCONTINUED | OUTPATIENT
Start: 2023-09-20 | End: 2023-09-20 | Stop reason: HOSPADM

## 2023-09-20 RX ORDER — FENTANYL CITRATE 50 UG/ML
INJECTION, SOLUTION INTRAMUSCULAR; INTRAVENOUS AS NEEDED
Status: DISCONTINUED | OUTPATIENT
Start: 2023-09-20 | End: 2023-09-20 | Stop reason: SURG

## 2023-09-20 RX ORDER — LIDOCAINE HYDROCHLORIDE 10 MG/ML
0.5 INJECTION, SOLUTION EPIDURAL; INFILTRATION; INTRACAUDAL; PERINEURAL ONCE AS NEEDED
Status: COMPLETED | OUTPATIENT
Start: 2023-09-20 | End: 2023-09-20

## 2023-09-20 RX ORDER — HYDRALAZINE HYDROCHLORIDE 20 MG/ML
5 INJECTION INTRAMUSCULAR; INTRAVENOUS
Status: DISCONTINUED | OUTPATIENT
Start: 2023-09-20 | End: 2023-09-20 | Stop reason: HOSPADM

## 2023-09-20 RX ORDER — LIDOCAINE HYDROCHLORIDE 10 MG/ML
INJECTION, SOLUTION EPIDURAL; INFILTRATION; INTRACAUDAL; PERINEURAL AS NEEDED
Status: DISCONTINUED | OUTPATIENT
Start: 2023-09-20 | End: 2023-09-20 | Stop reason: SURG

## 2023-09-20 RX ORDER — LOSARTAN POTASSIUM 50 MG/1
100 TABLET ORAL
Status: DISCONTINUED | OUTPATIENT
Start: 2023-09-20 | End: 2023-09-21 | Stop reason: HOSPADM

## 2023-09-20 RX ORDER — ALBUTEROL SULFATE 2.5 MG/3ML
2.5 SOLUTION RESPIRATORY (INHALATION) EVERY 4 HOURS PRN
Status: DISCONTINUED | OUTPATIENT
Start: 2023-09-20 | End: 2023-09-21 | Stop reason: HOSPADM

## 2023-09-20 RX ORDER — SODIUM CHLORIDE, SODIUM LACTATE, POTASSIUM CHLORIDE, CALCIUM CHLORIDE 600; 310; 30; 20 MG/100ML; MG/100ML; MG/100ML; MG/100ML
9 INJECTION, SOLUTION INTRAVENOUS CONTINUOUS
Status: DISCONTINUED | OUTPATIENT
Start: 2023-09-20 | End: 2023-09-20

## 2023-09-20 RX ORDER — PHENYLEPHRINE HCL IN 0.9% NACL 1 MG/10 ML
SYRINGE (ML) INTRAVENOUS AS NEEDED
Status: DISCONTINUED | OUTPATIENT
Start: 2023-09-20 | End: 2023-09-20 | Stop reason: SURG

## 2023-09-20 RX ORDER — LABETALOL HYDROCHLORIDE 5 MG/ML
10 INJECTION, SOLUTION INTRAVENOUS EVERY 4 HOURS PRN
Status: DISCONTINUED | OUTPATIENT
Start: 2023-09-20 | End: 2023-09-21 | Stop reason: HOSPADM

## 2023-09-20 RX ORDER — LIDOCAINE HYDROCHLORIDE 20 MG/ML
JELLY TOPICAL AS NEEDED
Status: DISCONTINUED | OUTPATIENT
Start: 2023-09-20 | End: 2023-09-20 | Stop reason: HOSPADM

## 2023-09-20 RX ORDER — INSULIN LISPRO 100 [IU]/ML
2-7 INJECTION, SOLUTION INTRAVENOUS; SUBCUTANEOUS
Status: DISCONTINUED | OUTPATIENT
Start: 2023-09-20 | End: 2023-09-21 | Stop reason: HOSPADM

## 2023-09-20 RX ORDER — ONDANSETRON 4 MG/1
4 TABLET, FILM COATED ORAL EVERY 6 HOURS PRN
Status: DISCONTINUED | OUTPATIENT
Start: 2023-09-20 | End: 2023-09-21 | Stop reason: HOSPADM

## 2023-09-20 RX ORDER — ONDANSETRON 2 MG/ML
INJECTION INTRAMUSCULAR; INTRAVENOUS AS NEEDED
Status: DISCONTINUED | OUTPATIENT
Start: 2023-09-20 | End: 2023-09-20 | Stop reason: SURG

## 2023-09-20 RX ORDER — HYDROCODONE BITARTRATE AND ACETAMINOPHEN 5; 325 MG/1; MG/1
1 TABLET ORAL ONCE AS NEEDED
Status: DISCONTINUED | OUTPATIENT
Start: 2023-09-20 | End: 2023-09-20 | Stop reason: HOSPADM

## 2023-09-20 RX ORDER — SODIUM CHLORIDE 9 MG/ML
40 INJECTION, SOLUTION INTRAVENOUS AS NEEDED
Status: DISCONTINUED | OUTPATIENT
Start: 2023-09-20 | End: 2023-09-20 | Stop reason: HOSPADM

## 2023-09-20 RX ORDER — PROPOFOL 10 MG/ML
VIAL (ML) INTRAVENOUS AS NEEDED
Status: DISCONTINUED | OUTPATIENT
Start: 2023-09-20 | End: 2023-09-20 | Stop reason: SURG

## 2023-09-20 RX ADMIN — ONDANSETRON 4 MG: 2 INJECTION INTRAMUSCULAR; INTRAVENOUS at 13:02

## 2023-09-20 RX ADMIN — LIDOCAINE HYDROCHLORIDE 0.5 ML: 10 INJECTION, SOLUTION EPIDURAL; INFILTRATION; INTRACAUDAL; PERINEURAL at 10:34

## 2023-09-20 RX ADMIN — Medication 100 MCG: at 12:49

## 2023-09-20 RX ADMIN — Medication 100 MCG: at 13:25

## 2023-09-20 RX ADMIN — Medication 2000 MG: at 20:04

## 2023-09-20 RX ADMIN — INSULIN LISPRO 2 UNITS: 100 INJECTION, SOLUTION INTRAVENOUS; SUBCUTANEOUS at 20:03

## 2023-09-20 RX ADMIN — SODIUM CHLORIDE, POTASSIUM CHLORIDE, SODIUM LACTATE AND CALCIUM CHLORIDE: 600; 310; 30; 20 INJECTION, SOLUTION INTRAVENOUS at 13:02

## 2023-09-20 RX ADMIN — SODIUM CHLORIDE, POTASSIUM CHLORIDE, SODIUM LACTATE AND CALCIUM CHLORIDE 9 ML/HR: 600; 310; 30; 20 INJECTION, SOLUTION INTRAVENOUS at 10:34

## 2023-09-20 RX ADMIN — LOSARTAN POTASSIUM 100 MG: 50 TABLET, FILM COATED ORAL at 17:12

## 2023-09-20 RX ADMIN — FENTANYL CITRATE 50 MCG: 50 INJECTION, SOLUTION INTRAMUSCULAR; INTRAVENOUS at 12:31

## 2023-09-20 RX ADMIN — SUGAMMADEX 200 MG: 100 INJECTION, SOLUTION INTRAVENOUS at 13:38

## 2023-09-20 RX ADMIN — FAMOTIDINE 20 MG: 20 TABLET ORAL at 10:35

## 2023-09-20 RX ADMIN — FENTANYL CITRATE 25 MCG: 50 INJECTION, SOLUTION INTRAMUSCULAR; INTRAVENOUS at 13:19

## 2023-09-20 RX ADMIN — SODIUM CHLORIDE 100 ML/HR: 9 INJECTION, SOLUTION INTRAVENOUS at 17:13

## 2023-09-20 RX ADMIN — DEXAMETHASONE SODIUM PHOSPHATE 4 MG: 4 INJECTION, SOLUTION INTRA-ARTICULAR; INTRALESIONAL; INTRAMUSCULAR; INTRAVENOUS; SOFT TISSUE at 12:31

## 2023-09-20 RX ADMIN — ROCURONIUM BROMIDE 50 MG: 10 SOLUTION INTRAVENOUS at 12:31

## 2023-09-20 RX ADMIN — Medication 100 MCG: at 13:31

## 2023-09-20 RX ADMIN — OXYCODONE HYDROCHLORIDE 5 MG: 5 TABLET ORAL at 17:12

## 2023-09-20 RX ADMIN — LIDOCAINE HYDROCHLORIDE 50 MG: 10 INJECTION, SOLUTION EPIDURAL; INFILTRATION; INTRACAUDAL; PERINEURAL at 12:31

## 2023-09-20 RX ADMIN — FENTANYL CITRATE 25 MCG: 50 INJECTION, SOLUTION INTRAMUSCULAR; INTRAVENOUS at 12:51

## 2023-09-20 RX ADMIN — Medication 100 MCG: at 13:29

## 2023-09-20 RX ADMIN — CEFAZOLIN 2000 MG: 10 INJECTION, POWDER, FOR SOLUTION INTRAVENOUS at 12:31

## 2023-09-20 RX ADMIN — OXYBUTYNIN CHLORIDE 10 MG: 10 TABLET, EXTENDED RELEASE ORAL at 17:12

## 2023-09-20 RX ADMIN — EPHEDRINE SULFATE 5 MG: 50 INJECTION INTRAVENOUS at 13:33

## 2023-09-20 RX ADMIN — Medication 100 MCG: at 13:10

## 2023-09-20 RX ADMIN — PROPOFOL 200 MG: 10 INJECTION, EMULSION INTRAVENOUS at 12:31

## 2023-09-20 RX ADMIN — ROCURONIUM BROMIDE 10 MG: 10 SOLUTION INTRAVENOUS at 12:53

## 2023-09-20 NOTE — ANESTHESIA POSTPROCEDURE EVALUATION
Patient: Justus Bishop    Procedure Summary       Date: 09/20/23 Room / [facility]:  [facility]    Anesthesia Start: 1226 Anesthesia Stop: 1425    Procedure: CYSTOSCOPY, AQUABLATION WATER JET THERAPY FOR BPH (CPT 0421T, ) (Bladder) Diagnosis:       BPH with obstruction/lower urinary tract symptoms      (BPH with obstruction/lower urinary tract symptoms [N40.1, N13.8])    Surgeons: Rob Escobar MD Provider: Esau Ellington MD    Anesthesia Type: general ASA Status: 3            Anesthesia Type: general    Vitals  Vitals Value Taken Time   /61 09/20/23 1425   Temp 97.7 °F (36.5 °C) 09/20/23 1425   Pulse 89 09/20/23 1425   Resp 12 09/20/23 1425   SpO2 93 % 09/20/23 1425           Post Anesthesia Care and Evaluation    Patient location during evaluation: PACU  Patient participation: complete - patient participated  Level of consciousness: awake and alert  Pain score: 0  Pain management: adequate    Airway patency: patent  Anesthetic complications: No anesthetic complications  PONV Status: none  Cardiovascular status: hemodynamically stable and acceptable  Respiratory status: nonlabored ventilation, acceptable and nasal cannula  Hydration status: acceptable  No anesthesia care post op

## 2023-09-20 NOTE — ANESTHESIA PROCEDURE NOTES
Airway  Urgency: elective    Date/Time: 9/20/2023 12:34 PM  Difficult airway    General Information and Staff    Patient location during procedure: OR  CRNA/CAA: Gadiel Barillas CRNA    Indications and Patient Condition  Indications for airway management: airway protection    Preoxygenated: yes  MILS not maintained throughout  Mask difficulty assessment: 1 - vent by mask    Final Airway Details  Final airway type: endotracheal airway      Successful airway: ETT  Cuffed: yes   Successful intubation technique: video laryngoscopy  Facilitating devices/methods: Bougie and anterior pressure/BURP  Endotracheal tube insertion site: oral  Blade: Chavez  Blade size: 4  ETT size (mm): 7.5  Cormack-Lehane Classification: grade IIa - partial view of glottis  Placement verified by: chest auscultation and capnometry   Cuff volume (mL): 8  Measured from: lips  ETT/EBT  to lips (cm): 22  Number of attempts at approach: 2  Assessment: lips, teeth, and gum same as pre-op and atraumatic intubation    Additional Comments  Negative epigastric sounds, Breath sound equal bilaterally with symmetric chest rise and fall

## 2023-09-20 NOTE — OP NOTE
TRANSURETHRAL PROSTATE AQUABLATION  Procedure Note    Patient: Justus Bishop    Date: 9/20/2023    MRN: [id number]    Indication for Procedure: 74yo male with refractory BPH despite medical therapy, found to have significant trilobar hyperplasia with significant lateral lobe hyperplasia with large intravesical median lobe and reduced flow rate on pre-operative workup consistent with outlet obstruction.  After discussion of risks, benefits, and alternatives he elects to proceed to the operating room today for Aquablation water jet therapy for BPH.    Pre-op Diagnosis:   BPH with obstruction/lower urinary tract symptoms [N40.1, N13.8]    Post-op Diagnosis:     Post-Op Diagnosis Codes:     * BPH with obstruction/lower urinary tract symptoms [N40.1, N13.8]    Procedure/CPT® Codes:  0241T      Anesthesia: General    Staff Surgeon:    Rob Escobar MD    Estimated Blood Loss: 100ml    Specimens:                ID Type Source Tests Collected by Time   A : prostate tissue Tissue Prostate TISSUE PATHOLOGY EXAM Rob Escobar MD 9/20/2023 1325         Drains: 22 Fr 3-way claudio catheter on CBI    Complications: None    Findings:   Severe trilobar hyperplasia, large intravesical median lobe extending into the bladder lumen.  Uncomplicated aqua ablation with widely patent prostate and bladder neck at conclusion of procedure.  Hemostasis obtained after formal TURP of bladder neck and mid prostate.  Prostate specimen sent.    Description of Procedure: The patient was seen and examined in the preoperative area.  The risks, benefits, and alternatives were explained to the patient and family.  Informed consent was obtained.  The patient was then taken back to the operating room and placed on the table in a supine position.  Sequential compression device boots were placed on the bilateral lower extremities.  General anesthesia was induced without any complication.  He was then placed in the dorsal lithotomy  position with all pressure points padded and secured.  The patient was prepped and draped in the usual sterile fashion and a timeout was taken.       50 cc of ultrasound gel was instilled into the rectum after VALENCIA demonstrated an enlarged smooth gland. The tranresctal probe was then inserted into the rectum.      The TRUS stepper was mounted to the articulating arm and secured to the OR bed.  The ultrasound probe was attached to the stepper.  The ultrasound probe was then aligned and confirmation was made that the prostate was centered in line using both transverse and sagittal views.  The bladder neck, verumontanum and the central/transition zones were identified.  The 24 Iraqi AQUABEAM handpiece was inserted atraumatically into the urethra, then advanced into the prostatic urethra and a complete cystoscopic evaluation was performed by inspecting the prostate, bladder, and identifying locations of the verumontanum/external sphincter. There was no obvious bladder lesions identified.  There was a large intravesical median lobe which was targeted during planning.     The AQUABEAM handpiece was secured to the handpiece articulating arm. I then confirmed alignment of the AQUABEAM handpiece and TRUS probe to be parallel and collinear.       The AQUABEAM nozzle was then confirmed to be centered in anterior portion of the bladder neck.  The cystoscope was then retracted to visualize the verumontanum and external sphincter and the cystoscope tip was positioned just proximal to the external sphincter to avoid injury to the sphincter complex.  I then re-confirmed alignment of the TRUS probe with the aqua beam handpiece and confirmed appropriate compression applied with the TRUS probe.  Horizontal alignment of the handpiece water jet nozzle was performed.     The aqua ablation treatment zones were planned digitally, utilizing real-time TRUS to visualize the contour of the prostate and the depth and radial angles of resection  were defined in the transverse view.  In the sagittal view, the aqua beam nozzle was identified and its position registered with the computer software.  The treatment contours were then adjusted to conform to the intended resection margins staying well away from the prostate capsule.  The median lobe, bladder neck and verumontanum were marked and confirmed in the treatment contour.  The aqua ablation treatment was then started, ablation of the lateral lobes and apical prostate sparing the verumontanum was confirmed on ultrasound.  Two passes were performed for treatment.  Once this was accomplished the handpiece was removed and a 26 Macedonian continuous-flow resectoscope was inserted into the bladder.  All clot was then evacuated out of the bladder with a Ahsan syringe.  At this point I used a loop cautery to resect the fluffy tissue around the bladder neck and mid prostate and obtain hemostasis.  Specimen was irrigated through the resectoscope and sent for for permanent section.  Once hemostasis was achieved a 22 Macedonian 3-way Bennett catheter was inserted into the bladder and placed under traction with 40 cc in the balloon.  CBI was then started.  The ultrasound probe was then removed.  The patient tolerated the procedure well and there were no complications to the procedure.  He was taken to PACU in stable and extubaed condition.    Disposition: The patient will be transferred to the floor once PACU criteria is met.  Monitor overnight on CBI, wean CBI in AM. Possible discharge tomorrow pending pain control and hematuria resolution. The intraoperative findings and postoperative plans were discussed with the patient and family.       Rob Escobar MD     Date: 9/20/2023  Time: 17:52 EDT

## 2023-09-20 NOTE — H&P
Pre-Op H&P  Justus Bishop  [id number]  1950      Chief complaint: Urinary Retention      Subjective:  Patient is a 73 y.o.male presents for scheduled surgery by Dr. Escobar. He anticipates a CYSTOSCOPY, AQUABLATION WATER JET THERAPY FOR BPH today.    The patient endorses having issues with urinary retention for approximately 5 years.  He denies having any other associated symptoms with his present condition.  He endorses taking Flomax, which has helped to alleviate his urinary retention.    + cardiac clearance in chart from 9/14/2023.    Review of Systems:  Constitutional-- No fever, chills or sweats. No fatigue.  CV-- No chest pain, palpitation or syncope. +HTN.  Resp-- No SOB, cough, hemoptysis  Skin--No rashes or lesions      Allergies: No Known Allergies      Home Meds:  Medications Prior to Admission   Medication Sig Dispense Refill Last Dose    albuterol sulfate  (90 Base) MCG/ACT inhaler Inhale 2 puffs Every 4 (Four) Hours As Needed for Wheezing. 1 g 0 9/19/2023 at 2100    atorvastatin (LIPITOR) 20 MG tablet Take 1 tablet by mouth Daily. 90 tablet 1 9/20/2023 at 0600    Breo Ellipta 200-25 MCG/ACT inhaler INHALE ONE PUFF BY MOUTH DAILY (Patient taking differently: Inhale 1 puff Daily.) 60 each 2 9/20/2023 at 0600    finasteride (PROSCAR) 5 MG tablet Take 1 tablet by mouth Daily. 90 tablet 3 9/20/2023 at 0600    hydroCHLOROthiazide (HYDRODIURIL) 25 MG tablet TAKE ONE TABLET BY MOUTH DAILY (Patient taking differently: Take 1 tablet by mouth Daily.) 90 tablet 0 9/20/2023 at 0600    mupirocin (BACTROBAN) 2 % ointment APPLY TO AFFECTED AREA(S) TWO TIMES A DAY (Patient taking differently: APPLY TO AFFECTED AREA(S) TWO TIMES A DAY as needed) 22 g 1 Past Week    olmesartan (BENICAR) 40 MG tablet Take 1 tablet by mouth Daily. 90 tablet 1 9/19/2023 at 0600    tamsulosin (FLOMAX) 0.4 MG capsule 24 hr capsule Take 1 capsule by mouth Daily. 90 capsule 3 9/20/2023 at 0600         PMH:   Past Medical  "History:   Diagnosis Date    Ankle sprain 1985    Anxiety 2019    Asthma 2016    Benign prostatic hyperplasia 2017    BPH with obstruction/lower urinary tract symptoms 2023    Cataract 2006    Repaired    Clotting disorder 2020    Intermittent (2 times, once recent) pt deniesa ever having clotting disorder    COPD (chronic obstructive pulmonary disease)     Depression 2019    Diabetes mellitus 23    Frozen shoulder     Hip arthrosis 2019    History of staph infection     folowing injury/ systemic poison oak    HL (hearing loss) 2000    Hormone disorder 2018    Low Testosterone    Hypertension 2018    Knee swelling 1980    Periarthritis of shoulder     Polycythemia     with significant infection ( skin) had therapeutic phlebitis    Rotator cuff syndrome     Substance abuse 2019    resolved    Tear of meniscus of knee 1985    Tendinitis of knee 1985    Tremor 2019     PSH:    Past Surgical History:   Procedure Laterality Date    CARDIAC CATHETERIZATION      clear    COLONOSCOPY      EYE SURGERY      cataract    KNEE SURGERY      ACL reconstruction       Immunization History:  Influenza:   Pneumococcal: Yes  Tetanus: Yes  Covid : x4    Social History:   Tobacco:   Social History     Tobacco Use   Smoking Status Former    Packs/day: 0.50    Years: 5.00    Pack years: 2.50    Types: Cigarettes    Start date: 1970    Quit date: 1975    Years since quittin.7    Passive exposure: Past   Smokeless Tobacco Never      Alcohol:     Social History     Substance and Sexual Activity   Alcohol Use Not Currently    Comment: 0-3x/daily; <6x/week         Physical Exam:/73 (BP Location: Right arm, Patient Position: Lying)   Pulse 94   Temp 97.2 °F (36.2 °C) (Temporal)   Resp 17   Ht 180.3 cm (71\")   Wt 122 kg (270 lb)   SpO2 94%   BMI 37.66 kg/m²       General Appearance:    Alert, cooperative, no distress, appears stated age   Head:    Normocephalic, without obvious " abnormality, atraumatic   Lungs:     Clear to auscultation bilaterally, respirations unlabored    Heart:   Regular rate and rhythm, S1 and S2 normal    Abdomen:    Soft without tenderness   Extremities:   Extremities normal, atraumatic, no cyanosis or edema   Skin:   Skin color, texture, turgor normal, no rashes or lesions   Neurologic:   Grossly intact     Results Review:     LABS:  Lab Results   Component Value Date    WBC 13.87 (H) 09/14/2023    HGB 17.2 09/14/2023    HCT 48.7 09/14/2023    MCV 91.0 09/14/2023     09/14/2023    NEUTROABS 9.09 (H) 02/08/2023    GLUCOSE 91 09/14/2023    BUN 10 09/14/2023    CREATININE 1.03 09/14/2023    EGFRIFNONA 85 01/26/2022    EGFRIFAFRI 103 01/26/2022     09/14/2023    K 3.6 09/14/2023     09/14/2023    CO2 24.8 09/14/2023    CALCIUM 9.4 09/14/2023    ALBUMIN 4.0 09/14/2023    AST 24 09/14/2023    ALT 38 09/14/2023    BILITOT 0.4 09/14/2023       RADIOLOGY:  Imaging Results (Last 72 Hours)       ** No results found for the last 72 hours. **            I reviewed the patient's new clinical results.      Impression:   BPH with obstruction/lower urinary tract symptoms       Plan: CYSTOSCOPY, AQUABLATION WATER JET THERAPY FOR BPH       Michael Abdullahi, KEYANNA   9/20/2023   11:25 EDT

## 2023-09-20 NOTE — ANESTHESIA PREPROCEDURE EVALUATION
Anesthesia Evaluation     Patient summary reviewed and Nursing notes reviewed   NPO Solid Status: > 8 hours  NPO Liquid Status: > 2 hours           Airway   Mallampati: IV  TM distance: >3 FB  Neck ROM: full  Possible difficult intubation  Dental      Pulmonary     breath sounds clear to auscultation  (+) COPD,  Cardiovascular     ECG reviewed  Rhythm: regular  Rate: normal    (+) hypertension, hyperlipidemia      Neuro/Psych  (+) psychiatric history Depression  GI/Hepatic/Renal/Endo    (+) diabetes mellitus    Musculoskeletal     Abdominal    Substance History      OB/GYN          Other   arthritis,     ROS/Med Hx Other: · Left ventricular ejection fraction appears to be 56 - 60%. Left ventricular systolic function is normal.  · Left ventricular diastolic function is consistent with (grade I) impaired relaxation.  · Estimated right ventricular systolic pressure from tricuspid regurgitation is normal (<35 mmHg). Calculated right ventricular systolic pressure from tricuspid regurgitation is 23 mmHg.     Obtained cardiac clearance                    Anesthesia Plan    ASA 3     general     intravenous induction     Anesthetic plan, risks, benefits, and alternatives have been provided, discussed and informed consent has been obtained with: patient.    Plan discussed with CRNA.    CODE STATUS:

## 2023-09-20 NOTE — H&P
Admission      Patient Name: Justus Bishop  MRN: [id number]  : 1950  DOS: 2023    Attending: Rob Escobar MD    Primary Care Provider: Misty Mai DO      Patient Care Team:  Misty Mai DO as PCP - General (Family Medicine)  Hector Silva MD as Consulting Physician (Hematology and Oncology)  Viraj Owusu MD as Consulting Physician (Cardiology)  Leonor Mcintyre PA-C as Physician Assistant (Cardiology)    Chief complaint: Urinary difficulty    Subjective   Patient is a pleasant 73 y.o. male presented for scheduled surgery by Dr. Escobar.  He underwent cystoscopy with aquablation water jet therapy for BPH under general anesthesia.  He tolerated surgery well and was admitted for further medical management.  He reports intermittent difficulty with urination for over the last 5 years.  He has had worsening of urinary symptoms.  Symptoms include weak stream, frequency, retention and nocturia.  He gets some relief with combination of Flomax and finasteride.    When seen in PACU he is doing well.  His pain is well controlled.  He denies nausea, shortness of breath or chest pain.  No history of DVT or PE.    Allergies:  No Known Allergies    Meds:  Medications Prior to Admission   Medication Sig Dispense Refill Last Dose    albuterol sulfate  (90 Base) MCG/ACT inhaler Inhale 2 puffs Every 4 (Four) Hours As Needed for Wheezing. 1 g 0 2023 at 2100    atorvastatin (LIPITOR) 20 MG tablet Take 1 tablet by mouth Daily. 90 tablet 1 2023 at 0600    Breo Ellipta 200-25 MCG/ACT inhaler INHALE ONE PUFF BY MOUTH DAILY (Patient taking differently: Inhale 1 puff Daily.) 60 each 2 2023 at 0600    finasteride (PROSCAR) 5 MG tablet Take 1 tablet by mouth Daily. 90 tablet 3 2023 at 0600    hydroCHLOROthiazide (HYDRODIURIL) 25 MG tablet TAKE ONE TABLET BY MOUTH DAILY (Patient taking differently: Take 1 tablet by mouth Daily.) 90 tablet 0 2023 at 0600     mupirocin (BACTROBAN) 2 % ointment APPLY TO AFFECTED AREA(S) TWO TIMES A DAY (Patient taking differently: APPLY TO AFFECTED AREA(S) TWO TIMES A DAY as needed) 22 g 1 Past Week    olmesartan (BENICAR) 40 MG tablet Take 1 tablet by mouth Daily. 90 tablet 1 9/19/2023 at 0600    tamsulosin (FLOMAX) 0.4 MG capsule 24 hr capsule Take 1 capsule by mouth Daily. 90 capsule 3 9/20/2023 at 0600         History:   Past Medical History:   Diagnosis Date    Ankle sprain 1985    Anxiety 2019    Asthma 2016    Benign prostatic hyperplasia 2017    BPH with obstruction/lower urinary tract symptoms 08/22/2023    Cataract 2006    Repaired    Clotting disorder 2020    Intermittent (2 times, once recent) pt deniesa ever having clotting disorder    COPD (chronic obstructive pulmonary disease)     Depression 2019    Diabetes mellitus 2/8/23    Frozen shoulder     Hip arthrosis 2019    History of staph infection     folowing injury/ systemic poison oak    HL (hearing loss) 2000    Hormone disorder 2018    Low Testosterone    Hypertension 2018    Knee swelling 1980    Periarthritis of shoulder 2021    Polycythemia     with significant infection ( skin) had therapeutic phlebitis    Rotator cuff syndrome 2020    Substance abuse 2019    resolved    Tear of meniscus of knee 1985    Tendinitis of knee 1985    Tremor 2019     Past Surgical History:   Procedure Laterality Date    CARDIAC CATHETERIZATION  2016    clear    COLONOSCOPY      EYE SURGERY  2006    cataract    KNEE SURGERY  1985    ACL reconstruction     Family History   Problem Relation Age of Onset    Depression Mother     Kidney disease Mother     Kidney cancer Mother     Cancer Father         Multiple Myeloma    Early death Father     Heart disease Father     Hypertension Sister     Heart disease Sister     Pulmonary fibrosis Sister      Social History     Tobacco Use    Smoking status: Former     Packs/day: 0.50     Years: 5.00     Pack years: 2.50     Types: Cigarettes     Start  "date: 1970     Quit date: 1975     Years since quittin.7     Passive exposure: Past    Smokeless tobacco: Never   Vaping Use    Vaping Use: Never used   Substance Use Topics    Alcohol use: Not Currently     Comment: 0-3x/daily; <6x/week    Drug use: Not Currently     Comment: was part of medical marijuana study in [facility] in 2020   He lives alone and has 1 child.  He is a retired .    Review of Systems  All systems were reviewed and negative except for:  Respiratory: positive for  shortness of air    Vital Signs  /70   Pulse 85   Temp 97.7 °F (36.5 °C) (Temporal)   Resp 16   Ht 180.3 cm (71\")   Wt 122 kg (270 lb)   SpO2 94%   BMI 37.66 kg/m²     Physical Exam:    General Appearance:    Alert, cooperative, in no acute distress   Head:    Normocephalic, without obvious abnormality, atraumatic   Eyes:            Lids and lashes normal, conjunctivae and sclerae normal, no   icterus, no pallor, corneas clear   Ears:    Ears appear intact with no abnormalities noted   Throat:   No oral lesions, no thrush, oral mucosa moist   Neck:   No adenopathy, supple, trachea midline, no thyromegaly    Lungs:     Clear to auscultation,respirations regular, even and       unlabored. No wheezes or rales.    Heart:    Regular rhythm and normal rate, normal S1 and S2   Abdomen:      Soft, nontender   Genitalia:    Deferred.  Bennett-bloody UOP   Extremities:   Moves all extremities well, no edema, no cyanosis, no redness   Pulses:   Pulses palpable and equal bilaterally   Skin:   No bleeding, bruising or rash   Neurologic:   Cranial nerves 2 - 12 grossly intact,       Results from last 7 days   Lab Units 23  1521 23  1519   WBC 10*3/mm3 13.86* 13.87*   HEMOGLOBIN g/dL 16.5 17.2   HEMATOCRIT % 47.4 48.7   PLATELETS 10*3/mm3 268 291     Results from last 7 days   Lab Units 23  1519   SODIUM mmol/L 139   POTASSIUM mmol/L 3.6   CHLORIDE mmol/L 101   CO2 mmol/L 24.8   BUN mg/dL 10 "   CREATININE mg/dL 1.03   CALCIUM mg/dL 9.4   BILIRUBIN mg/dL 0.4   ALK PHOS U/L 65   ALT (SGPT) U/L 38   AST (SGOT) U/L 24   GLUCOSE mg/dL 91     Lab Results   Component Value Date    HGBA1C 7.10 (H) 09/14/2023       Assessment and Plan:     S/P cystoscopy with aquablation    BPH with obstruction/lower urinary tract symptoms    Essential hypertension    Hyperlipidemia LDL goal <100    Elevated hemoglobin A1c, in diabetic range    BPH (benign prostatic hyperplasia)      Plan  1. Ambulation  2. Pain control-prns   3. IS-encourage  4. DVT proph- Mechanicals  5. Bowel regimen  6. Resume home medications per Dr. Escobar  7. Monitor post-op labs  8. DC planning   9. Diet, Clears, advance diet as tolerated.  IVF initially, monitor volume status.    HTN, Hyperlipidemia  - Continue home statin, HCTZ and Cozaar  - Monitor BP   - Holding parameters for BP meds  - Labetalol PRN for SBP>170    Elevated hemoglobin A1c: In diabetic range  - Explained to patient implication of A1C elevation, need to modify diet and increase activity, and f/u with PCP.      Tatyana Nuñez, KEYANNA  09/20/23  15:47 EDT

## 2023-09-20 NOTE — BRIEF OP NOTE
TRANSURETHRAL PROSTATE AQUABLATION  Progress Note    Justus Bishop  9/20/2023    Pre-op Diagnosis:   BPH with obstruction/lower urinary tract symptoms [N40.1, N13.8]       Post-Op Diagnosis Codes:     * BPH with obstruction/lower urinary tract symptoms [N40.1, N13.8]         Procedure(s):  CYSTOSCOPY, AQUABLATION WATER JET THERAPY FOR BPH (CPT 0421T, )              Surgeon(s):  Rob Escobar MD    Anesthesia: General    Staff:   Circulator: Tami Griffith RN; Niurka Dietrich RN  Scrub Person: Mariella Cline; Lyao Jaeger; Adan Naik RN         Estimated Blood Loss: minimal    Urine Voided: * No values recorded between 9/20/2023 12:24 PM and 9/20/2023  1:38 PM *    Specimens:                Specimens       ID Source Type Tests Collected By Collected At Frozen?    A Prostate Tissue TISSUE PATHOLOGY EXAM   Rob Escobar MD 9/20/23 1325     Description: prostate tissue    This specimen was not marked as sent.                  Drains: * No LDAs found *    Findings: high bladder neck, large median lobe, uncomplicated Aquablation, minimal bleeding. Good hemostasis. 22 Fr 3-way hematuria catheter on CBI.         Complications: None           Rob Escobar MD     Date: 9/20/2023  Time: 13:48 EDT

## 2023-09-20 NOTE — PLAN OF CARE
Problem: Adult Inpatient Plan of Care  Goal: Plan of Care Review  9/20/2023 1812 by Judy Willis RN  Outcome: Ongoing, Progressing  Flowsheets (Taken 9/20/2023 1812)  Outcome Evaluation: VSS. RA. PRN's given for pain. No complaints of nausea. CBI infusing at a fast rate. Bleeding controlled. No other concerns at this time. Will continue with the plan of care.   Goal Outcome Evaluation:              Outcome Evaluation: VSS. RA. PRN's given for pain. No complaints of nausea. CBI infusing at a fast rate. Bleeding controlled. No other concerns at this time. Will continue with the plan of care.

## 2023-09-21 VITALS
WEIGHT: 270 LBS | DIASTOLIC BLOOD PRESSURE: 71 MMHG | OXYGEN SATURATION: 96 % | RESPIRATION RATE: 18 BRPM | TEMPERATURE: 99 F | BODY MASS INDEX: 37.8 KG/M2 | SYSTOLIC BLOOD PRESSURE: 127 MMHG | HEIGHT: 71 IN | HEART RATE: 84 BPM

## 2023-09-21 LAB
ANION GAP SERPL CALCULATED.3IONS-SCNC: 11 MMOL/L (ref 5–15)
BUN SERPL-MCNC: 14 MG/DL (ref 8–23)
BUN/CREAT SERPL: 13.5 (ref 7–25)
CALCIUM SPEC-SCNC: 8.2 MG/DL (ref 8.6–10.5)
CHLORIDE SERPL-SCNC: 101 MMOL/L (ref 98–107)
CO2 SERPL-SCNC: 24 MMOL/L (ref 22–29)
CREAT SERPL-MCNC: 1.04 MG/DL (ref 0.76–1.27)
CYTO UR: NORMAL
DEPRECATED RDW RBC AUTO: 47 FL (ref 37–54)
EGFRCR SERPLBLD CKD-EPI 2021: 75.8 ML/MIN/1.73
ERYTHROCYTE [DISTWIDTH] IN BLOOD BY AUTOMATED COUNT: 14.1 % (ref 12.3–15.4)
GLUCOSE BLDC GLUCOMTR-MCNC: 114 MG/DL (ref 70–130)
GLUCOSE BLDC GLUCOMTR-MCNC: 118 MG/DL (ref 70–130)
GLUCOSE SERPL-MCNC: 119 MG/DL (ref 65–99)
HCT VFR BLD AUTO: 43.2 % (ref 37.5–51)
HGB BLD-MCNC: 15.1 G/DL (ref 13–17.7)
LAB AP CASE REPORT: NORMAL
LAB AP CLINICAL INFORMATION: NORMAL
MCH RBC QN AUTO: 31.7 PG (ref 26.6–33)
MCHC RBC AUTO-ENTMCNC: 35 G/DL (ref 31.5–35.7)
MCV RBC AUTO: 90.8 FL (ref 79–97)
PATH REPORT.FINAL DX SPEC: NORMAL
PATH REPORT.GROSS SPEC: NORMAL
PLATELET # BLD AUTO: 273 10*3/MM3 (ref 140–450)
PMV BLD AUTO: 8.5 FL (ref 6–12)
POTASSIUM SERPL-SCNC: 3.7 MMOL/L (ref 3.5–5.2)
RBC # BLD AUTO: 4.76 10*6/MM3 (ref 4.14–5.8)
SODIUM SERPL-SCNC: 136 MMOL/L (ref 136–145)
WBC NRBC COR # BLD: 21.02 10*3/MM3 (ref 3.4–10.8)

## 2023-09-21 PROCEDURE — 85027 COMPLETE CBC AUTOMATED: CPT | Performed by: STUDENT IN AN ORGANIZED HEALTH CARE EDUCATION/TRAINING PROGRAM

## 2023-09-21 PROCEDURE — 63710000001 OXYBUTYNIN XL 10 MG TABLET SUSTAINED-RELEASE 24 HOUR: Performed by: STUDENT IN AN ORGANIZED HEALTH CARE EDUCATION/TRAINING PROGRAM

## 2023-09-21 PROCEDURE — A9270 NON-COVERED ITEM OR SERVICE: HCPCS | Performed by: STUDENT IN AN ORGANIZED HEALTH CARE EDUCATION/TRAINING PROGRAM

## 2023-09-21 PROCEDURE — 99239 HOSP IP/OBS DSCHRG MGMT >30: CPT | Performed by: NURSE PRACTITIONER

## 2023-09-21 PROCEDURE — 63710000001 FINASTERIDE 5 MG TABLET: Performed by: STUDENT IN AN ORGANIZED HEALTH CARE EDUCATION/TRAINING PROGRAM

## 2023-09-21 PROCEDURE — 63710000001 SENNOSIDES-DOCUSATE 8.6-50 MG TABLET: Performed by: STUDENT IN AN ORGANIZED HEALTH CARE EDUCATION/TRAINING PROGRAM

## 2023-09-21 PROCEDURE — 63710000001 OXYCODONE 5 MG TABLET: Performed by: STUDENT IN AN ORGANIZED HEALTH CARE EDUCATION/TRAINING PROGRAM

## 2023-09-21 PROCEDURE — 97116 GAIT TRAINING THERAPY: CPT

## 2023-09-21 PROCEDURE — 63710000001 ATORVASTATIN 20 MG TABLET: Performed by: STUDENT IN AN ORGANIZED HEALTH CARE EDUCATION/TRAINING PROGRAM

## 2023-09-21 PROCEDURE — 97161 PT EVAL LOW COMPLEX 20 MIN: CPT

## 2023-09-21 PROCEDURE — 82948 REAGENT STRIP/BLOOD GLUCOSE: CPT

## 2023-09-21 PROCEDURE — 80048 BASIC METABOLIC PNL TOTAL CA: CPT | Performed by: STUDENT IN AN ORGANIZED HEALTH CARE EDUCATION/TRAINING PROGRAM

## 2023-09-21 RX ORDER — NITROFURANTOIN 25; 75 MG/1; MG/1
100 CAPSULE ORAL 2 TIMES DAILY
Qty: 6 CAPSULE | Refills: 0 | Status: SHIPPED | OUTPATIENT
Start: 2023-09-21 | End: 2023-09-24

## 2023-09-21 RX ORDER — OXYBUTYNIN CHLORIDE 10 MG/1
10 TABLET, EXTENDED RELEASE ORAL DAILY
Qty: 30 TABLET | Refills: 0 | Status: SHIPPED | OUTPATIENT
Start: 2023-09-21 | End: 2023-10-21

## 2023-09-21 RX ORDER — HYOSCYAMINE SULFATE 0.12 MG/1
0.12 TABLET SUBLINGUAL EVERY 4 HOURS PRN
Qty: 30 EACH | Refills: 1 | Status: SHIPPED | OUTPATIENT
Start: 2023-09-21

## 2023-09-21 RX ADMIN — FINASTERIDE 5 MG: 5 TABLET, FILM COATED ORAL at 08:14

## 2023-09-21 RX ADMIN — OXYCODONE HYDROCHLORIDE 5 MG: 5 TABLET ORAL at 00:58

## 2023-09-21 RX ADMIN — Medication 2000 MG: at 04:38

## 2023-09-21 RX ADMIN — ATORVASTATIN CALCIUM 20 MG: 20 TABLET, FILM COATED ORAL at 08:11

## 2023-09-21 RX ADMIN — OXYBUTYNIN CHLORIDE 10 MG: 10 TABLET, EXTENDED RELEASE ORAL at 08:11

## 2023-09-21 RX ADMIN — SENNOSIDES AND DOCUSATE SODIUM 2 TABLET: 50; 8.6 TABLET ORAL at 08:10

## 2023-09-21 NOTE — THERAPY EVALUATION
Patient Name: Justus Bishop  : 1950    MRN: [id number]                              Today's Date: 2023       Admit Date: 2023    Visit Dx:     ICD-10-CM ICD-9-CM   1. BPH with obstruction/lower urinary tract symptoms  N40.1 600.01    N13.8 599.69     Patient Active Problem List   Diagnosis    Essential hypertension    Hyperlipidemia LDL goal <100    Leukocytosis    Wellness examination    Diastolic dysfunction    Dizziness    Family history of ASCVD    BPH with obstruction/lower urinary tract symptoms    S/P cystoscopy with aquablation    Elevated hemoglobin A1c, in diabetic range    BPH (benign prostatic hyperplasia)     Past Medical History:   Diagnosis Date    Ankle sprain 1985    Anxiety 2019    Asthma 2016    Benign prostatic hyperplasia 2017    BPH with obstruction/lower urinary tract symptoms 2023    Cataract 2006    Repaired    Clotting disorder 2020    Intermittent (2 times, once recent) pt deniesa ever having clotting disorder    COPD (chronic obstructive pulmonary disease)     Depression 2019    Diabetes mellitus 23    Frozen shoulder     Hip arthrosis 2019    History of staph infection     folowing injury/ systemic poison oak    HL (hearing loss) 2000    Hormone disorder 2018    Low Testosterone    Hypertension 2018    Knee swelling 1980    Periarthritis of shoulder     Polycythemia     with significant infection ( skin) had therapeutic phlebitis    Rotator cuff syndrome 2020    Substance abuse 2019    resolved    Tear of meniscus of knee 1985    Tendinitis of knee 1985    Tremor 2019     Past Surgical History:   Procedure Laterality Date    CARDIAC CATHETERIZATION  2016    clear    COLONOSCOPY      EYE SURGERY  2006    cataract    KNEE SURGERY  1985    ACL reconstruction    PROSTATE AQUABLATION N/A 2023    Procedure: CYSTOSCOPY, AQUABLATION WATER JET THERAPY FOR BPH;  Surgeon: Rob Escobar MD;  Location: [facility];  Service: Robotics - Urology;   Laterality: N/A;      General Information       Row Name 09/21/23 1405          Physical Therapy Time and Intention    Document Type evaluation  -LR     Mode of Treatment physical therapy;individual therapy  -LR       Row Name 09/21/23 1405          General Information    Patient Profile Reviewed yes  -LR     Prior Level of Function independent:;all household mobility;community mobility;gait;transfer;bed mobility;ADL's;home management;cooking;cleaning;driving;shopping;using stairs  -LR     Existing Precautions/Restrictions other (see comments)  gonzález [facility]LL[facility] HOH  -LR     Barriers to Rehab hearing deficit  -LR       Row Name 09/21/23 1405          Living Environment    People in Home alone  -LR       Row Name 09/21/23 1405          Home Main Entrance    Number of Stairs, Main Entrance five  -LR     Stair Railings, Main Entrance railings on both sides of stairs  -LR       Row Name 09/21/23 1405          Stairs Within Home, Primary    Number of Stairs, Within Home, Primary none  -LR       Row Name 09/21/23 1405          Cognition    Orientation Status (Cognition) oriented x 4  -LR       Row Name 09/21/23 1405          Safety Issues, Functional Mobility    Safety Issues Affecting Function (Mobility) other (see comments)  none  -LR     Impairments Affecting Function (Mobility) shortness of breath  -LR               User Key  (r) = Recorded By, (t) = Taken By, (c) = Cosigned By      Initials Name Provider Type    LR Gloria Lazaro, PT Physical Therapist                   Mobility       Row Name 09/21/23 1405          Bed Mobility    Bed Mobility supine-sit;sit-supine  -LR     Supine-Sit [place] (Bed Mobility) verbal cues;standby assist  -LR     Sit-Supine [place] (Bed Mobility) verbal cues;standby assist  -LR     Assistive Device (Bed Mobility) head of bed elevated;bed rails  -LR     Comment, (Bed Mobility) Verbal cues to attempt log rolling technique for decreased pain with transition in and out of bed.  Denied dizziness upon sitting up. No assist required but did require increased time to perform.  -LR       Row Name 09/21/23 1405          Transfers    Comment, (Transfers) Verbal cues for correct hand placement with t/f. Denied dizziness upon standing.  -LR       Row Name 09/21/23 1405          Bed-Chair Transfer    Bed-[place]air [place] (Transfers) not tested  -LR       Row Name 09/21/23 1405          Sit-Stand Transfer    Sit-Stand [place] (Transfers) verbal cues;standby assist  -LR     Assistive Device (Sit-Stand Transfers) other (see comments)  no AD  -LR       Row Name 09/21/23 1405          Gait/Stairs (Locomotion)    [place] Level (Gait) verbal cues;standby assist  -LR     Assistive Device (Gait) other (see comments)  IV pole  -LR     Distance in Feet (Gait) 500  -LR     Deviations/Abnormal Patterns (Gait) bilateral deviations;palma decreased;gait speed decreased;stride length decreased  -LR     [place] Level (Stairs) verbal cues;contact guard  -LR     Handrail Location (Stairs) left side (ascending)  -LR     Number of Steps (Stairs) 2  -LR     Ascending Technique (Stairs) step-to-step  -LR     Descending Technique (Stairs) step-to-step  -LR     Comment, (Gait/Stairs) Patient ambulated with step through gait pattern at slow pace. No LOB or unsteadiness observed. Patient slightly SOA with ambulation. Patient reports this is baseline for him d/t COPD. Gait limited by fatigue. Patient climbed 2 steps with no difficulty. Unable to climb 5 steps to simulate home entrance d/t CBI.  -LR               User Key  (r) = Recorded By, (t) = Taken By, (c) = Cosigned By      Initials Name Provider Type    Gloria Tobias, PT Physical Therapist                   Obj/Interventions       Row Name 09/21/23 1405          Range of Motion Comprehensive    General Range of Motion bilateral lower extremity ROM WFL;bilateral upper extremity ROM WFL  -LR       Row Name 09/21/23 1405          Strength  Comprehensive (MMT)    General Manual Muscle Testing (MMT) Assessment other (see comments);no strength deficits identified  -LR     Comment, General Manual Muscle Testing (MMT) Assessment B UEs/LEs 5/5 throughout upon formal MMT  -LR       Row Name 09/21/23 1405          Balance    Balance Assessment sitting static balance;sitting dynamic balance;standing static balance;standing dynamic balance  -LR     Static Sitting Balance independent  -LR     Dynamic Sitting Balance independent  -LR     Position, Sitting Balance unsupported;sitting edge of bed  -LR     Static Standing Balance standby assist  -LR     Dynamic Standing Balance standby assist  -LR     Position/Device Used, Standing Balance supported;other (see comments)  IV Pole  -LR       Row Name 09/21/23 1405          Sensory Assessment (Somatosensory)    Sensory Assessment (Somatosensory) sensation intact;other (see comments)  denies numbness/tingling; reports light touch equal and intact in B UEs/LEs  -LR               User Key  (r) = Recorded By, (t) = Taken By, (c) = Cosigned By      Initials Name Provider Type    LR Gloria Lazaro, PT Physical Therapist                   Goals/Plan       Row Name 09/21/23 1405          Bed Mobility Goal 1 (PT)    Activity/Assistive Device (Bed Mobility Goal 1, PT) sit to supine/supine to sit  -LR     [place] Level/Cues Needed (Bed Mobility Goal 1, PT) independent  -LR     Time Frame (Bed Mobility Goal 1, PT) long term goal (LTG);5 days  -LR     Progress/Outcomes (Bed Mobility Goal 1, PT) goal ongoing  -LR       Row Name 09/21/23 1405          Transfer Goal 1 (PT)    Activity/Assistive Device (Transfer Goal 1, PT) sit-to-stand/stand-to-sit  -LR     [place] Level/Cues Needed (Transfer Goal 1, PT) independent  -LR     Time Frame (Transfer Goal 1, PT) long term goal (LTG);5 days  -LR     Progress/Outcome (Transfer Goal 1, PT) goal ongoing  -LR       Row Name 09/21/23 1405          Gait Training Goal 1 (PT)     Activity/Assistive Device (Gait Training Goal 1, PT) gait (walking locomotion)  -LR     [place] Level (Gait Training Goal 1, PT) independent  -LR     Distance (Gait Training Goal 1, PT) 750 feet  -LR     Time Frame (Gait Training Goal 1, PT) long term goal (LTG);5 days  -LR     Progress/Outcome (Gait Training Goal 1, PT) goal ongoing  -LR       Row Name 09/21/23 1405          Stairs Goal 1 (PT)    Activity/Assistive Device (Stairs Goal 1, PT) ascending stairs;descending stairs;using handrail, left;using handrail, right;step-to-step  -LR     [place] Level/Cues Needed (Stairs Goal 1, PT) standby assist  -LR     Number of Stairs (Stairs Goal 1, PT) 5  -LR     Time Frame (Stairs Goal 1, PT) long term goal (LTG);5 days  -LR     Progress/Outcome (Stairs Goal 1, PT) goal ongoing  -LR       Row Name 09/21/23 1131          Therapy Assessment/Plan (PT)    Planned Therapy Interventions (PT) balance training;bed mobility training;gait training;home exercise program;patient/family education;ROM (range of motion);stair training;strengthening;transfer training  -LR               User Key  (r) = Recorded By, (t) = Taken By, (c) = Cosigned By      Initials Name Provider Type    LR Gloria Lazaro, PT Physical Therapist                   Clinical Impression       Row Name 09/21/23 8535          Pain    Pretreatment Pain Rating 0/10 - no pain  -LR     Posttreatment Pain Rating 0/10 - no pain  -LR     Pain Intervention(s) Ambulation/increased activity;Repositioned  -LR       Row Name 09/21/23 1488          Plan of Care Review    Plan of Care Reviewed With patient  -LR     Progress improving  -LR     Outcome Evaluation Patient ambulated 500 feet with IV pole, SBA, step through gait pattern, limited by fatigue and SOA. Patient climbed 2 steps with one handrail with no difficulty. Encouraged frequent ambulation. Patient should be able to safely mobilize about the home upon d/c today. Recommend d/c home with assist.  -LR        Row Name 09/21/23 1405          Therapy Assessment/Plan (PT)    Patient/Family Therapy Goals Statement (PT) go home  -LR     Rehab Potential (PT) good, to achieve stated therapy goals  -LR     Criteria for Skilled Interventions Met (PT) yes;meets criteria;skilled treatment is necessary  -LR     Therapy Frequency (PT) daily  -LR       Row Name 09/21/23 1405          Positioning and Restraints    Pre-Treatment Position in bed  -LR     Post Treatment Position bed  -LR     In Bed notified nsg;supine;call light within reach;encouraged to call for assist;exit alarm on;side rails up x2;SCD pump applied  SCDs not on on arrival.  -LR               User Key  (r) = Recorded By, (t) = Taken By, (c) = Cosigned By      Initials Name Provider Type    Gloria Tobias, PT Physical Therapist                   Outcome Measures       Row Name 09/21/23 1405 09/21/23 0800       How much help from another person do you currently need...    Turning from your back to your side while in flat bed without using bedrails? 3  -LR 4  -MH    Moving from lying on back to sitting on the side of a flat bed without bedrails? 3  -LR 4  -MH    Moving to and from a bed to a chair (including a wheelchair)? 3  -LR 4  -MH    Standing up from a chair using your arms (e.g., wheelchair, bedside chair)? 3  -LR 3  -MH    Climbing 3-5 steps with a railing? 3  -LR 3  -MH    To walk in hospital room? 3  -LR 3  -MH    AM-PAC 6 Clicks Score (PT) 18  -LR 21  -MH    Highest level of mobility 6 --> Walked 10 steps or more  -LR 6 --> Walked 10 steps or more  -MH      Row Name 09/21/23 1405          Functional Assessment    Outcome Measure Options AM-PAC 6 Clicks Basic Mobility (PT)  -LR               User Key  (r) = Recorded By, (t) = Taken By, (c) = Cosigned By      Initials Name Provider Type    Gloria Tobias, PT Physical Therapist    Judy Kim, RN Registered Nurse                                 Physical Therapy Education        Title: PT OT SLP Therapies (Resolved)       Topic: Physical Therapy (Resolved)       Point: Mobility training (Resolved)       Learning Progress Summary             Patient Acceptance, E,D, VU,DU by LR at 9/21/2023 1405    Comment: Educated on precautions, correct log rolling technique, correct sit<->stand t/f technique, correct gait mechanics, correct stair training technique, and correct car t/f technique.                         Point: Body mechanics (Resolved)       Learning Progress Summary             Patient Acceptance, E,D, VU,DU by LR at 9/21/2023 1405    Comment: Educated on precautions, correct log rolling technique, correct sit<->stand t/f technique, correct gait mechanics, correct stair training technique, and correct car t/f technique.                         Point: Precautions (Resolved)       Learning Progress Summary             Patient Acceptance, E,D, VU,DU by LR at 9/21/2023 1405    Comment: Educated on precautions, correct log rolling technique, correct sit<->stand t/f technique, correct gait mechanics, correct stair training technique, and correct car t/f technique.                                         User Key       Initials Effective Dates Name Provider Type Discipline     02/03/23 -  Gloria Lazaro, PT Physical Therapist PT                  PT Recommendation and Plan  Planned Therapy Interventions (PT): balance training, bed mobility training, gait training, home exercise program, patient/family education, ROM (range of motion), stair training, strengthening, transfer training  Plan of Care Reviewed With: patient  Progress: improving  Outcome Evaluation: Patient ambulated 500 feet with IV pole, SBA, step through gait pattern, limited by fatigue and SOA. Patient climbed 2 steps with one handrail with no difficulty. Encouraged frequent ambulation. Patient should be able to safely mobilize about the home upon d/c today. Recommend d/c home with assist.     Time Calculation:   PT  Evaluation Complexity  History, PT Evaluation Complexity: 1-2 personal factors and/or comorbidities  Examination of Body Systems (PT Eval Complexity): 1-2 elements  Clinical Presentation (PT Evaluation Complexity): stable  Clinical Decision Making (PT Evaluation Complexity): low complexity  Overall Complexity (PT Evaluation Complexity): low complexity     PT Charges       Row Name 09/21/23 1405             Time Calculation    Start Time 1405  -LR      PT Received On 09/21/23  -LR      PT Goal Re-Cert Due Date 10/01/23  -LR         Timed Charges    52744 - Gait Training Minutes  10  -LR         Untimed Charges    PT Eval/Re-eval Minutes 40  -LR         Total Minutes    Timed Charges Total Minutes 10  -LR      Untimed Charges Total Minutes 40  -LR       Total Minutes 50  -LR                User Key  (r) = Recorded By, (t) = Taken By, (c) = Cosigned By      Initials Name Provider Type    LR Gloria Lzaaro, PT Physical Therapist                  Therapy Charges for Today       Code Description Service Date Service Provider Modifiers Qty    37630586120 HC GAIT TRAINING EA 15 MIN 9/21/2023 Gloria Lazaro, PT GP 1    53368388948 HC PT EVAL LOW COMPLEXITY 3 9/21/2023 Gloria Lazaro, PT GP 1            PT G-Codes  Outcome Measure Options: AM-PAC 6 Clicks Basic Mobility (PT)  AM-PAC 6 Clicks Score (PT): 18  PT Discharge Summary  Anticipated Discharge Disposition (PT): home with assist    Gloria Lazaro, PT  9/21/2023

## 2023-09-21 NOTE — PLAN OF CARE
Problem: Adult Inpatient Plan of Care  Goal: Plan of Care Review  Outcome: Ongoing, Progressing  Flowsheets  Taken 9/21/2023 0744 by Alayna Mas RN  Progress: improving  Outcome Evaluation: VSS. RA-2LNC while asleep. PRN oxycodone given per pt request. No c/o nausea. CBI in place going at moderate speed with reddish pink output. Bennett catheter bleeding at urethra, cath care provided. IVF infusing. Abx administered. Pt resting comfortably at this time. Will continue to follow POC.  Taken 9/20/2023 1054 by Shirin Flores RN  Plan of Care Reviewed With: patient  Goal: Patient-Specific Goal (Individualized)  Outcome: Ongoing, Progressing  Goal: Absence of Hospital-Acquired Illness or Injury  Outcome: Ongoing, Progressing  Intervention: Identify and Manage Fall Risk  Recent Flowsheet Documentation  Taken 9/21/2023 0600 by Alayna Mas, HANNY  Safety Promotion/Fall Prevention:   activity supervised   assistive device/personal items within reach   clutter free environment maintained   safety round/check completed   room organization consistent   nonskid shoes/slippers when out of bed  Taken 9/21/2023 0400 by Alayna Mas, HANNY  Safety Promotion/Fall Prevention:   activity supervised   assistive device/personal items within reach   clutter free environment maintained   safety round/check completed   room organization consistent   nonskid shoes/slippers when out of bed  Taken 9/21/2023 0200 by Alayna Mas, RN  Safety Promotion/Fall Prevention:   activity supervised   assistive device/personal items within reach   clutter free environment maintained   safety round/check completed   room organization consistent   nonskid shoes/slippers when out of bed  Taken 9/21/2023 0000 by Alayna Mas, RN  Safety Promotion/Fall Prevention:   activity supervised   assistive device/personal items within reach   clutter free environment maintained   safety round/check completed   room organization consistent   nonskid shoes/slippers when  out of bed  Taken 9/20/2023 2200 by Alayna Mas RN  Safety Promotion/Fall Prevention:   activity supervised   assistive device/personal items within reach   clutter free environment maintained   safety round/check completed   room organization consistent   nonskid shoes/slippers when out of bed  Taken 9/20/2023 2000 by Alayna Mas RN  Safety Promotion/Fall Prevention:   activity supervised   assistive device/personal items within reach   clutter free environment maintained   safety round/check completed   room organization consistent   nonskid shoes/slippers when out of bed  Intervention: Prevent Skin Injury  Recent Flowsheet Documentation  Taken 9/21/2023 0600 by Alayna Mas RN  Body Position: position changed independently  Skin Protection:   adhesive use limited   tubing/devices free from skin contact   transparent dressing maintained   skin-to-skin areas padded   skin-to-device areas padded   protective footwear used  Taken 9/21/2023 0400 by Alayna Mas RN  Body Position: position changed independently  Skin Protection:   adhesive use limited   tubing/devices free from skin contact   transparent dressing maintained   skin-to-skin areas padded   skin-to-device areas padded   protective footwear used  Taken 9/21/2023 0200 by Alayna Mas RN  Body Position:   position changed independently   neutral head position   neutral body alignment   supine  Skin Protection:   adhesive use limited   tubing/devices free from skin contact   transparent dressing maintained   skin-to-skin areas padded   skin-to-device areas padded   protective footwear used  Taken 9/21/2023 0000 by Alayna Mas RN  Body Position:   position changed independently   weight shifting   side-lying  Skin Protection:   adhesive use limited   tubing/devices free from skin contact   transparent dressing maintained   skin-to-skin areas padded   skin-to-device areas padded   protective footwear used  Taken 9/20/2023 2200 by Alayna Mas  RN  Body Position:   position changed independently   supine, legs elevated  Skin Protection:   adhesive use limited   tubing/devices free from skin contact   transparent dressing maintained   skin-to-skin areas padded   skin-to-device areas padded   protective footwear used  Taken 9/20/2023 2000 by Alayna Mas RN  Body Position:   position changed independently   supine, legs elevated   neutral head position  Skin Protection:   adhesive use limited   tubing/devices free from skin contact   transparent dressing maintained   skin-to-skin areas padded   skin-to-device areas padded   protective footwear used  Intervention: Prevent and Manage VTE (Venous Thromboembolism) Risk  Recent Flowsheet Documentation  Taken 9/21/2023 0600 by Alayna Msa RN  Activity Management: activity encouraged  Taken 9/21/2023 0400 by Alayna Mas RN  Activity Management: activity encouraged  Taken 9/21/2023 0200 by Alayna Mas RN  Activity Management: activity encouraged  Taken 9/21/2023 0000 by Alayna Mas RN  Activity Management: activity encouraged  Taken 9/20/2023 2200 by Alayna Mas RN  Activity Management: activity encouraged  Taken 9/20/2023 2000 by Alayna Mas RN  Activity Management: activity encouraged  VTE Prevention/Management:   bilateral   sequential compression devices on  Range of Motion: active ROM (range of motion) encouraged  Intervention: Prevent Infection  Recent Flowsheet Documentation  Taken 9/20/2023 2000 by Alayna Mas RN  Infection Prevention:   environmental surveillance performed   equipment surfaces disinfected   hand hygiene promoted   personal protective equipment utilized   rest/sleep promoted   single patient room provided  Goal: Optimal Comfort and Wellbeing  Outcome: Ongoing, Progressing  Intervention: Provide Person-Centered Care  Recent Flowsheet Documentation  Taken 9/20/2023 2000 by Alayna Mas RN  Trust Relationship/Rapport:   care explained   questions answered    thoughts/feelings acknowledged  Goal: Readiness for Transition of Care  Outcome: Ongoing, Progressing     Problem: Fall Injury Risk  Goal: Absence of Fall and Fall-Related Injury  Outcome: Ongoing, Progressing  Intervention: Identify and Manage Contributors  Recent Flowsheet Documentation  Taken 9/21/2023 0600 by Alayna Mas RN  Self-Care Promotion: independence encouraged  Taken 9/21/2023 0400 by Alayna Mas RN  Self-Care Promotion: independence encouraged  Taken 9/21/2023 0200 by Alayna Mas RN  Self-Care Promotion: independence encouraged  Taken 9/21/2023 0000 by Alayna Mas RN  Self-Care Promotion: independence encouraged  Taken 9/20/2023 2200 by Alayna Mas RN  Self-Care Promotion: independence encouraged  Taken 9/20/2023 2000 by Alayna Mas RN  Medication Review/Management: medications reviewed  Self-Care Promotion: independence encouraged  Intervention: Promote Injury-Free Environment  Recent Flowsheet Documentation  Taken 9/21/2023 0600 by Alayna Mas RN  Safety Promotion/Fall Prevention:   activity supervised   assistive device/personal items within reach   clutter free environment maintained   safety round/check completed   room organization consistent   nonskid shoes/slippers when out of bed  Taken 9/21/2023 0400 by Alayna Mas RN  Safety Promotion/Fall Prevention:   activity supervised   assistive device/personal items within reach   clutter free environment maintained   safety round/check completed   room organization consistent   nonskid shoes/slippers when out of bed  Taken 9/21/2023 0200 by Alayna Mas RN  Safety Promotion/Fall Prevention:   activity supervised   assistive device/personal items within reach   clutter free environment maintained   safety round/check completed   room organization consistent   nonskid shoes/slippers when out of bed  Taken 9/21/2023 0000 by Alayna Mas RN  Safety Promotion/Fall Prevention:   activity supervised   assistive device/personal  items within reach   clutter free environment maintained   safety round/check completed   room organization consistent   nonskid shoes/slippers when out of bed  Taken 9/20/2023 2200 by Alayna Mas RN  Safety Promotion/Fall Prevention:   activity supervised   assistive device/personal items within reach   clutter free environment maintained   safety round/check completed   room organization consistent   nonskid shoes/slippers when out of bed  Taken 9/20/2023 2000 by Alayna Mas RN  Safety Promotion/Fall Prevention:   activity supervised   assistive device/personal items within reach   clutter free environment maintained   safety round/check completed   room organization consistent   nonskid shoes/slippers when out of bed     Problem: Asthma Comorbidity  Goal: Maintenance of Asthma Control  Outcome: Ongoing, Progressing  Intervention: Maintain Asthma Symptom Control  Recent Flowsheet Documentation  Taken 9/20/2023 2000 by Alayna Mas RN  Medication Review/Management: medications reviewed     Problem: COPD (Chronic Obstructive Pulmonary Disease) Comorbidity  Goal: Maintenance of COPD Symptom Control  Outcome: Ongoing, Progressing  Intervention: Maintain COPD-Symptom Control  Recent Flowsheet Documentation  Taken 9/21/2023 0600 by Alayna Mas RN  Supportive Measures:   active listening utilized   verbalization of feelings encouraged   self-care encouraged  Taken 9/21/2023 0400 by Alayna Mas RN  Supportive Measures:   active listening utilized   self-care encouraged   verbalization of feelings encouraged  Taken 9/21/2023 0200 by Alayna Mas RN  Supportive Measures:   active listening utilized   self-care encouraged   verbalization of feelings encouraged  Taken 9/21/2023 0000 by Alayna Mas RN  Supportive Measures:   active listening utilized   self-care encouraged   verbalization of feelings encouraged  Taken 9/20/2023 2200 by Alayna Mas RN  Supportive Measures:   active listening utilized    self-care encouraged   verbalization of feelings encouraged  Taken 9/20/2023 2000 by Alayna Mas RN  Supportive Measures:   active listening utilized   verbalization of feelings encouraged   self-care encouraged  Medication Review/Management: medications reviewed     Problem: Diabetes Comorbidity  Goal: Blood Glucose Level Within Targeted Range  Outcome: Ongoing, Progressing  Intervention: Monitor and Manage Glycemia  Recent Flowsheet Documentation  Taken 9/20/2023 2000 by Alayna Mas RN  Glycemic Management: blood glucose monitored   Goal Outcome Evaluation:           Progress: improving  Outcome Evaluation: VSS. RA-2LNC while asleep. PRN oxycodone given per pt request. No c/o nausea. CBI in place going at moderate speed with reddish pink output. Bennett catheter bleeding at urethra, cath care provided. IVF infusing. Abx administered. Pt resting comfortably at this time. Will continue to follow POC.

## 2023-09-21 NOTE — PLAN OF CARE
Goal Outcome Evaluation:  Plan of Care Reviewed With: patient        Progress: improving  Outcome Evaluation: Patient ambulated 500 feet with IV pole, SBA, step through gait pattern, limited by fatigue and SOA. Patient climbed 2 steps with one handrail with no difficulty. Encouraged frequent ambulation. Patient should be able to safely mobilize about the home upon d/c today. Recommend d/c home with assist.      Anticipated Discharge Disposition (PT): home with assist

## 2023-09-21 NOTE — PROGRESS NOTES
[facility]   Urology Progress Note    Patient Name: Justus Bishop  : 1950  MRN: [id number]  Primary Care Physician:  Misty Mai DO  Date of admission: 2023    Subjective   Subjective     Chief Complaint: BPH    HPI:  Patient Reports no significant issues overnight.  CBI running clear for the majority of the night.  Patient states he was trying to shift in bed and was straining a bit and he started having some bleeding.  He has had some bleeding around the catheter tip.  He denies significant bladder spasms.  He has had some hypotension 100/60.  Renal function stable, creatinine 1.04.  Hemoglobin stable 15.  He does have leukocytosis 21, likely reactive.    Review of Systems   All systems were reviewed and negative except for: None    Objective   Objective     Vitals:   Temp:  [97.2 °F (36.2 °C)-98.5 °F (36.9 °C)] 98.5 °F (36.9 °C)  Heart Rate:  [] 86  Resp:  [12-18] 18  BP: ()/(58-81) 105/73  Flow (L/min):  [2] 2  FiO2 (%):  [80 %-90 %] 90 %  Physical Exam    Constitutional: Awake in bed, alert   Eyes: PERRLA, sclerae anicteric, no conjunctival injection   HENT: Normocephalic, atraumatic, mucous membranes moist   Neck: Supple, trachea midline   Respiratory: Equal chest rise, non-labored respirations    Cardiovascular: RRR, palpable radial pulses bilaterally   Gastrointestinal: Soft, nontender, non-distended   Genitourinary: circumcised phallus, orthotopic meatus, bilaterally descended testicles without masses, Bennett catheter in place draining light pink urine on slow drip CBI   Musculoskeletal: No lower extremity edema bilaterally, no clubbing or cyanosis to extremities   Psychiatric: Appropriate affect, cooperative   Neurologic: Oriented x 3,  Cranial Nerves grossly intact, speech clear   Skin: No rashes     Result Review    Result Review:  I have personally reviewed the results from the time of this admission to 2023 08:38 EDT and agree with these findings:  [x]   Laboratory  []  Microbiology  []  Radiology  []  EKG/Telemetry   []  Cardiology/Vascular   []  Pathology  []  Old records  []  Other:    Most notable findings include: Stable hemoglobin, stable renal function    Assessment & Plan   Assessment / Plan     Brief Patient Summary:  Justus Bishop is a 73 y.o. male who underwent cystoscopy with aqua ablation of prostate on 9/20/2023.  Large prostate with fairly significant vasculature.  Catheter remains in place on traction, we will continue CBI given light bleeding.  Catheter was irrigated at the bedside with no bleeding or clots return.  We will try to wean off the CBI today and possibly discharge this afternoon if doing well.  We would plan for catheter removal in clinic with nursing tomorrow 9/22/2023.  If continued hematuria he will require continued admission with CBI.    Active Hospital Problems:  Active Hospital Problems    Diagnosis     **S/P cystoscopy with aquablation     Elevated hemoglobin A1c, in diabetic range     BPH (benign prostatic hyperplasia)     BPH with obstruction/lower urinary tract symptoms     Essential hypertension     Hyperlipidemia LDL goal <100        Plan:     - Pain control: P.o. oxycodone, as needed Dilaudid, as needed antispasmodics  - Diet/FEN: Regular diet, continue IV fluids for hypotension  - GI: bowel regimen  - : Maintain Bennett catheter on traction, continue CBI  - Heme/ID: Avoid anticoagulation given surgery, SCDs  - OOB/Amb  -Disposition: Possibly home today versus tomorrow pending CBI weaning         DVT prophylaxis:  Mechanical DVT prophylaxis orders are present.    CODE STATUS:   Level Of Support Discussed With: Patient  Code Status (Patient has no pulse and is not breathing): CPR (Attempt to Resuscitate)  Medical Interventions (Patient has pulse or is breathing): Full Support    Disposition:  I expect patient to possibly discharge today versus tomorrow.    Electronically signed by Rob Escobar MD, 09/21/23,  8:38 AM EDT.

## 2023-09-21 NOTE — DISCHARGE SUMMARY
Date of Discharge:  9/21/2023    Discharge Diagnosis:   BPH with obstruction/lower urinary tract symptoms.     Problem List:  Active Hospital Problems    Diagnosis  POA    **S/P cystoscopy with aquablation [Z98.890]  Not Applicable    Elevated hemoglobin A1c, in diabetic range [R73.09]  Unknown    BPH (benign prostatic hyperplasia) [N40.0]  Yes    BPH with obstruction/lower urinary tract symptoms [N40.1, N13.8]  Yes    Essential hypertension [I10]  Yes    Hyperlipidemia LDL goal <100 [E78.5]  Yes      Resolved Hospital Problems   No resolved problems to display.       Presenting Problem/History of Present Illness  BPH with obstruction/lower urinary tract symptoms [N40.1, N13.8]  BPH (benign prostatic hyperplasia) [N40.0]    Hospital Course  Patient is a 73 y.o. male who is POD 1 s/p Cystoscopy, Aquablation Water Jet Therapy for BPH with Dr. Escobar. He was admitted for overnight observation with continuous bladder irrigation. His claudio catheter was irrigated on POD 1 by Dr. Escobar without return of clots. He continued to have mild hematuria and remained on CBI until noon. His CBI was clamped and urine remained dark pink off CBI without clots. He has been ambulating and tolerating diet. He is passing flatus. His pain is controlled and vital signs are stable.     He is stable for discharge home 09/21. He will discharge home with claudio catheter in place. Plan for outpatient voiding trial in our office on 09/22 and then follow up with Dr. Escobar in 6 weeks.     D/w Dr. Escobar.     Procedures Performed    Procedure(s):  CYSTOSCOPY, AQUABLATION WATER JET THERAPY FOR BPH  -------------------       Consults:   Consults       Date and Time Order Name Status Description    9/20/2023  3:10 PM Inpatient Hospitalist Consult              Pertinent Test Results: labs: WBC 21.02, Cr 1.04.     Condition on Discharge:  Awake, alert, stable, oriented, pain controlled.     Vital Signs  Temp:  [97.7 °F (36.5 °C)-98.5 °F  (36.9 °C)] 98.4 °F (36.9 °C)  Heart Rate:  [] 76  Resp:  [12-18] 18  BP: ()/(58-81) 113/67    Discharge Disposition  Home or Self Care    Discharge Medications     Discharge Medications        New Medications        Instructions Start Date   Hyoscyamine Sulfate SL 0.125 MG sublingual tablet  Commonly known as: Levsin/SL   Place 1 tablet under the tongue Every 4 Hours As Needed for Bladder spasms.      nitrofurantoin (macrocrystal-monohydrate) 100 MG capsule  Commonly known as: Macrobid   100 mg, Oral, 2 Times Daily      oxybutynin XL 10 MG 24 hr tablet  Commonly known as: Ditropan XL   10 mg, Oral, Daily             Changes to Medications        Instructions Start Date   Breo Ellipta 200-25 MCG/ACT inhaler  Generic drug: Fluticasone Furoate-Vilanterol  What changed: when to take this   INHALE ONE PUFF BY MOUTH DAILY             Continue These Medications        Instructions Start Date   albuterol sulfate  (90 Base) MCG/ACT inhaler  Commonly known as: PROVENTIL HFA;VENTOLIN HFA;PROAIR HFA   2 puffs, Inhalation, Every 4 Hours PRN      atorvastatin 20 MG tablet  Commonly known as: LIPITOR   20 mg, Oral, Daily      finasteride 5 MG tablet  Commonly known as: PROSCAR   5 mg, Oral, Daily      hydroCHLOROthiazide 25 MG tablet  Commonly known as: HYDRODIURIL   TAKE ONE TABLET BY MOUTH DAILY      olmesartan 40 MG tablet  Commonly known as: BENICAR   40 mg, Oral, Daily      tamsulosin 0.4 MG capsule 24 hr capsule  Commonly known as: FLOMAX   0.4 mg, Oral, Daily             Stop These Medications      mupirocin 2 % ointment  Commonly known as: BACTROBAN              Discharge Diet   Diet Instructions       Advance Diet As Tolerated -Target Diet: Regular      Target Diet: Regular            Activity at Discharge  Activity Instructions       Activity as Tolerated      Bathing Restrictions      Type of Restriction: Bathing    Bathing Restrictions: No Tub Bath    Lifting Restrictions      Type of Restriction:  Lifting    Lifting Restrictions: Lifting Restriction (Indicate Limit)    Weight Limit (Pounds): 15    Length of Lifting Restriction: 2-3 weeks            Follow-up Appointments  Future Appointments   Date Time Provider Department Center   9/19/2024  9:30 AM Viraj Owusu MD [facility]     Additional Instructions for the Follow-ups that You Need to Schedule       Call MD With Problems / Concerns   As directed      Notify MD if develop Fever/chills, catheter not draining, dark/bloody urine to catheter, uncontrolled pain.    Order Comments: Notify MD if develop Fever/chills, catheter not draining, dark/bloody urine to catheter, uncontrolled pain.         Discharge Follow-up with Specified Provider: Dr. Escobar   As directed      To: Dr. Escobar   Follow Up Details: Follow up on 09/22 in office for voiding trial. Follow up in 6 weeks with Dr. Escobar.                Test Results Pending at Discharge      KEYANNA Frias    Time: Discharge 60 min

## 2023-09-21 NOTE — CASE MANAGEMENT/SOCIAL WORK
"Discharge Planning Assessment  [facility]     Patient Name: Justus Bishop  MRN: [id number]  Today's Date: 9/21/2023    Admit Date: 9/20/2023    Plan: CM eval   Discharge Needs Assessment    No documentation.                  Discharge Plan       Row Name 09/21/23 1013       Plan    Plan CM eval    Plan Comments Confirmed patient lives in [facility]. He is independent of ADLs at baseline. CBI currently infusing. Per MD note this AM: \"try to wean off the CBI today and possibly discharge this afternoon if doing well. We would plan for catheter removal in clinic with nursing tomorrow 9/22/2023. If continued hematuria he will require continued admission with CBI.\" Goal is to return home upon discharge - no dc needs identified at this time- DEV will continue to follow- imtiaz [phone]    Final Discharge Disposition Code 01 - home or self-care                  Continued Care and Services - Admitted Since 9/20/2023    Coordination has not been started for this encounter.          Demographic Summary    No documentation.                  Functional Status    No documentation.                  Psychosocial    No documentation.                  Abuse/Neglect    No documentation.                  Legal    No documentation.                  Substance Abuse    No documentation.                  Patient Forms    No documentation.                     Imtiaz Moctezuma RN    "

## 2023-09-21 NOTE — DISCHARGE SUMMARY
Patient Name: Justus Bishop  MRN: [id number]  : 1950  DOS: 2023    Attending: Rob Escobar MD    Primary Care Provider: Misty Mai DO    Date of Admission:.2023  9:26 AM    Date of Discharge:  2023    Discharge Diagnosis:   S/P cystoscopy with aquablation    Essential hypertension    Hyperlipidemia LDL goal <100    BPH with obstruction/lower urinary tract symptoms    Elevated hemoglobin A1c, in diabetic range    BPH (benign prostatic hyperplasia)      Hospital Course  At admit:  Patient is a pleasant 73 y.o. male presented for scheduled surgery by Dr. Escobar.  He underwent cystoscopy with aquablation water jet therapy for BPH under general anesthesia.  He tolerated surgery well and was admitted for further medical management.  He reports intermittent difficulty with urination for over the last 5 years.  He has had worsening of urinary symptoms.  Symptoms include weak stream, frequency, retention and nocturia.  He gets some relief with combination of Flomax and finasteride.     When seen in PACU he is doing well.  His pain is well controlled.  He denies nausea, shortness of breath or chest pain.  No history of DVT or PE.    After admit:  He was provided pain medication as needed for pain control. SAIDA report on the chart was reviewed.    He received DVT prophylaxis with mechanicals     His home medications were resumed as appropriate, he was started on po diet which he tolerated well prior to discharge.    Patient was encouraged to ambulate  which he did, he used IS for atelectasis prophylaxis.    Patient received continuous bladder irrigation via three-way Bennett postoperatively through post-procedure day 1, irrigation was stopped and patient was monitored for several hours with urine having no clots.    He continues to have a Bennett catheter in place which he will keep attached to a leg bag until his follow-up appointment with urology.    When I saw him today he is  "doing quite well and he is ready for discharge home.        Procedures Performed  Procedure(s):  CYSTOSCOPY, AQUABLATION WATER JET THERAPY FOR BPH       Pertinent Test Results:    I reviewed the patient's new clinical results.   Results from last 7 days   Lab Units 23  0714 23  1521 23  1519   WBC 10*3/mm3 21.02* 13.86* 13.87*   HEMOGLOBIN g/dL 15.1 16.5 17.2   HEMATOCRIT % 43.2 47.4 48.7   PLATELETS 10*3/mm3 273 268 291     Results from last 7 days   Lab Units 23  0715 23  1521 23  1519   SODIUM mmol/L 136 137 139   POTASSIUM mmol/L 3.7 3.6 3.6   CHLORIDE mmol/L 101 100 101   CO2 mmol/L 24.0 24.0 24.8   BUN mg/dL 14 14 10   CREATININE mg/dL 1.04 1.10 1.03   CALCIUM mg/dL 8.2* 8.8 9.4   BILIRUBIN mg/dL  --   --  0.4   ALK PHOS U/L  --   --  65   ALT (SGPT) U/L  --   --  38   AST (SGOT) U/L  --   --  24   GLUCOSE mg/dL 119* 139* 91     I reviewed the patient's new imaging including images and reports.       Discharge Assessment:       Visit Vitals  /67 (BP Location: Right arm, Patient Position: Lying)   Pulse 76   Temp 98.4 °F (36.9 °C) (Oral)   Resp 18   Ht 180.3 cm (71\")   Wt 122 kg (270 lb)   SpO2 94%   BMI 37.66 kg/m²     Temp (24hrs), Av °F (36.7 °C), Min:97.7 °F (36.5 °C), Max:98.5 °F (36.9 °C)      General Appearance:    Alert, cooperative, in no acute distress   Lungs:     Clear to auscultation,respirations regular, even and                   unlabored    Heart:    Regular rhythm and normal rate, normal S1 and S2    Abdomen:   Soft, benign, non-tender, non-distended.  : Bennett with Pink urine   Extremities:   Moves all extremities well, no edema, no cyanosis, no              redness   Pulses:   Pulses palpable and equal bilaterally   Skin:   No bleeding, bruising or rash            Discharge Disposition:home.         Discharge Medications        New Medications        Instructions Start Date   Hyoscyamine Sulfate SL 0.125 MG sublingual tablet  Commonly known as: " Levsin/SL   Place 1 tablet under the tongue Every 4 Hours As Needed for Bladder spasms.      nitrofurantoin (macrocrystal-monohydrate) 100 MG capsule  Commonly known as: Macrobid   100 mg, Oral, 2 Times Daily      oxybutynin XL 10 MG 24 hr tablet  Commonly known as: Ditropan XL   10 mg, Oral, Daily             Changes to Medications        Instructions Start Date   Breo Ellipta 200-25 MCG/ACT inhaler  Generic drug: Fluticasone Furoate-Vilanterol  What changed: when to take this   INHALE ONE PUFF BY MOUTH DAILY             Continue These Medications        Instructions Start Date   albuterol sulfate  (90 Base) MCG/ACT inhaler  Commonly known as: PROVENTIL HFA;VENTOLIN HFA;PROAIR HFA   2 puffs, Inhalation, Every 4 Hours PRN      atorvastatin 20 MG tablet  Commonly known as: LIPITOR   20 mg, Oral, Daily      finasteride 5 MG tablet  Commonly known as: PROSCAR   5 mg, Oral, Daily      hydroCHLOROthiazide 25 MG tablet  Commonly known as: HYDRODIURIL   TAKE ONE TABLET BY MOUTH DAILY      olmesartan 40 MG tablet  Commonly known as: BENICAR   40 mg, Oral, Daily      tamsulosin 0.4 MG capsule 24 hr capsule  Commonly known as: FLOMAX   0.4 mg, Oral, Daily             Stop These Medications      mupirocin 2 % ointment  Commonly known as: BACTROBAN                 Diet Instructions       Advance Diet As Tolerated -Target Diet: Regular      Target Diet: Regular               Activity Instructions       Activity as Tolerated      Bathing Restrictions      Type of Restriction: Bathing    Bathing Restrictions: No Tub Bath    Lifting Restrictions      Type of Restriction: Lifting    Lifting Restrictions: Lifting Restriction (Indicate Limit)    Weight Limit (Pounds): 15    Length of Lifting Restriction: 2-3 weeks          Restriction per urology.    Follow-up Appointments:  Future Appointments   Date Time Provider Department Center   9/28/2023  2:30 PM Misty Mai DO [facility]   9/19/2024  9:30 AM Viraj Owusu,  MD [facility]   Dr. Escobar   Follow Up Details: Follow up on 09/22 in office for voiding trial. Follow up in 6 weeks with Dr. Escobar.          Dragon disclaimer:  Part of this encounter note is an electronic transcription/translation of spoken language to printed text. The electronic translation of spoken language may permit erroneous, or at times, nonsensical words or phrases to be inadvertently transcribed; Although I have reviewed the note for such errors, some may still exist.       Quinn Bravo MD  09/21/23  14:35 EDT

## 2023-09-22 ENCOUNTER — CLINICAL SUPPORT (OUTPATIENT)
Dept: UROLOGY | Facility: CLINIC | Age: 73
End: 2023-09-22
Payer: MEDICARE

## 2023-09-22 DIAGNOSIS — N40.1 BPH WITH OBSTRUCTION/LOWER URINARY TRACT SYMPTOMS: Primary | ICD-10-CM

## 2023-09-22 DIAGNOSIS — N13.8 BPH WITH OBSTRUCTION/LOWER URINARY TRACT SYMPTOMS: Primary | ICD-10-CM

## 2023-09-22 NOTE — PROGRESS NOTES
Patient came in today for a voiding trial and catheter removal after surgery.  I explained the procedure to him and then had him get undressed from the waste down and lay on the table.  I then inserted 250 mL's of sterile water into the catheter until he said that he could not hold anymore, I then deflated the balloon in the catheter and removed it from his bladder.  He was able to void the full 250 mL's of water out and his pvr was 17 mL's.  He tolerated this well with no adverse reactions or complications.  I explained to him that he needed to start drinking water as soon as he left our office to be sure that he could continue to urinate and if for some reason he becomes unable then he needs to return to the office if before closing or to the ED if after hours.  He expressed understanding and said that he would do so.    ===    MA note reviewed.      Rob Escobar MD

## 2023-09-28 ENCOUNTER — OFFICE VISIT (OUTPATIENT)
Dept: FAMILY MEDICINE CLINIC | Facility: CLINIC | Age: 73
End: 2023-09-28
Payer: MEDICARE

## 2023-09-28 VITALS
DIASTOLIC BLOOD PRESSURE: 64 MMHG | OXYGEN SATURATION: 96 % | TEMPERATURE: 98.2 F | RESPIRATION RATE: 23 BRPM | BODY MASS INDEX: 37.94 KG/M2 | HEIGHT: 71 IN | HEART RATE: 96 BPM | SYSTOLIC BLOOD PRESSURE: 126 MMHG | WEIGHT: 271 LBS

## 2023-09-28 DIAGNOSIS — N40.1 BPH WITH OBSTRUCTION/LOWER URINARY TRACT SYMPTOMS: Primary | ICD-10-CM

## 2023-09-28 DIAGNOSIS — N13.8 BPH WITH OBSTRUCTION/LOWER URINARY TRACT SYMPTOMS: Primary | ICD-10-CM

## 2023-09-28 DIAGNOSIS — Z12.11 SCREENING FOR COLON CANCER: ICD-10-CM

## 2023-09-28 NOTE — PROGRESS NOTES
"Chief Complaint  Hospital Follow Up Visit    History of Present Illness      He was dc on 9/21   He says he has had zero pain.   He is urinating normally.   He has some mild blood in urine. He says he has made urology aware of this. He has been having some leaking that is improved.     The following portions of the patient's history were reviewed and updated as appropriate: allergies, current medications, past family history, past medical history, past social history, past surgical history, and problem list.    OBJECTIVE:  /64   Pulse 96   Temp 98.2 °F (36.8 °C) (Temporal)   Resp 23   Ht 180.3 cm (70.98\")   Wt 123 kg (271 lb)   SpO2 96%   BMI 37.81 kg/m²       Physical Exam  Constitutional:       General: He is not in acute distress.     Appearance: Normal appearance.   HENT:      Head: Normocephalic and atraumatic.   Eyes:      Extraocular Movements: Extraocular movements intact.   Cardiovascular:      Rate and Rhythm: Normal rate and regular rhythm.      Heart sounds: No murmur heard.  Pulmonary:      Effort: Pulmonary effort is normal. No respiratory distress.      Breath sounds: Normal breath sounds. No stridor. No wheezing, rhonchi or rales.   Abdominal:      General: Bowel sounds are normal.      Palpations: Abdomen is soft.      Tenderness: There is no abdominal tenderness. There is no right CVA tenderness, left CVA tenderness, guarding or rebound.   Skin:     Findings: No rash.   Neurological:      General: No focal deficit present.      Mental Status: He is alert.   Psychiatric:         Mood and Affect: Mood normal.                  Assessment and Plan   Diagnoses and all orders for this visit:    1. BPH with obstruction/lower urinary tract symptoms (Primary)      Reviewed dc summary cbc cmp. He is doing well and has f/u with urology scheduled.     Return in about 3 months (around 12/28/2023).       Misty Mai D.O.  [facility]   "

## 2023-09-28 NOTE — LETTER
[facility]  Vaccine Consent Form    Patient Name:  Justus Bishop  Patient :  1950     pneumococcal conj. 13-valent    Screening Checklist  The following questions should be completed prior to vaccination. If you answer “yes” to any question, it does not necessarily mean you should not be vaccinated. It just means we may need to clarify or ask more questions. If a question is unclear, please ask your healthcare provider to explain it.    Yes No   Any fever or moderate to severe illness today (mild illness and/or antibiotic treatment are not contraindications)?     Do you have a history of a serious reaction to any previous vaccinations, such as anaphylaxis, encephalopathy within 7 days, Guillain-[place] syndrome within 6 weeks, seizure?     Have you received any live vaccine(s) in the past month (MMR, LEONEL)?     Do you have an anaphylactic allergy to latex (DTaP, DTaP-IPV, Hep A, Hep B, MenB, RV, Td, Tdap), baker’s yeast (Hep B, HPV), or gelatin (LEONEL, MMR)?     Do you have an anaphylactic allergy to neomycin (Rabies, LEONEL, MMR, IPV, Hep A), polymyxin B (IPV), or streptomycin (IPV)?      Any cancer, leukemia, AIDS, or other immune system disorder? (LEONEL, MMR, RV)     Do you have a parent, brother, or sister with an immune system problem (if immune competence of vaccine recipient clinically verified, can proceed)? (MMR, LEONEL)     Any recent steroid treatments for >2 weeks, chemotherapy, or radiation treatment? (LEONEL, MMR)     Have you received antibody-containing blood transfusions or IVIG in the past 11 months (recommended interval is dependent on product)? (MMR, LEONEL)     Have you taken antiviral drugs (acyclovir, famciclovir, valacyclovir) in the last 24 or 48 hours, respectively (LEONEL)?      Are you pregnant or planning to become pregnant within 1 month? (LEONEL, MMR, HPV, IPV, MenB; For hep B- refer to Engerix-B)     For infants, have you ever been told your child has had intussusception or a medical  emergency involving obstruction of the intestine (RV)? If not for an infant, can skip this question.         *Ordering Physician/APC should be consulted if “yes” is checked by the patient or guardian above.      I have received, read, and understand the Vaccine Information Statement (VIS) for each vaccine ordered above.  I have considered my health status as well as the health status of my close contacts.  I have taken the opportunity to discuss my vaccine questions with my health care provider.   I have requested that the ordered vaccine(s) be given to me.  I understand the benefits and risks of the vaccines.  I understand that I should remain in the clinic for 15 minutes after receiving the vaccine(s).  _________________________________________________________  Signature of Patient or Parent/Legal Guardian ____________________  Date

## 2023-10-03 DIAGNOSIS — N40.1 BPH WITH OBSTRUCTION/LOWER URINARY TRACT SYMPTOMS: ICD-10-CM

## 2023-10-03 DIAGNOSIS — N13.8 BPH WITH OBSTRUCTION/LOWER URINARY TRACT SYMPTOMS: ICD-10-CM

## 2023-10-03 RX ORDER — ALBUTEROL SULFATE 90 UG/1
AEROSOL, METERED RESPIRATORY (INHALATION)
Qty: 18 G | Refills: 2 | Status: SHIPPED | OUTPATIENT
Start: 2023-10-03

## 2023-10-03 RX ORDER — TAMSULOSIN HYDROCHLORIDE 0.4 MG/1
1 CAPSULE ORAL DAILY
Qty: 60 CAPSULE | Refills: 2 | Status: SHIPPED | OUTPATIENT
Start: 2023-10-03

## 2023-10-03 RX ORDER — FLUTICASONE FUROATE AND VILANTEROL TRIFENATATE 200; 25 UG/1; UG/1
POWDER RESPIRATORY (INHALATION)
Qty: 60 EACH | Refills: 2 | Status: SHIPPED | OUTPATIENT
Start: 2023-10-03

## 2023-10-03 RX ORDER — FINASTERIDE 5 MG/1
5 TABLET, FILM COATED ORAL DAILY
Qty: 60 TABLET | Refills: 2 | Status: SHIPPED | OUTPATIENT
Start: 2023-10-03

## 2023-11-02 ENCOUNTER — OFFICE VISIT (OUTPATIENT)
Dept: UROLOGY | Facility: CLINIC | Age: 73
End: 2023-11-02
Payer: MEDICARE

## 2023-11-02 VITALS — HEIGHT: 71 IN | HEART RATE: 107 BPM | BODY MASS INDEX: 37.52 KG/M2 | OXYGEN SATURATION: 92 % | WEIGHT: 268 LBS

## 2023-11-02 DIAGNOSIS — R53.83 OTHER FATIGUE: ICD-10-CM

## 2023-11-02 DIAGNOSIS — N13.8 BPH WITH OBSTRUCTION/LOWER URINARY TRACT SYMPTOMS: Primary | ICD-10-CM

## 2023-11-02 DIAGNOSIS — N52.01 ERECTILE DYSFUNCTION DUE TO ARTERIAL INSUFFICIENCY: ICD-10-CM

## 2023-11-02 DIAGNOSIS — N40.1 BPH WITH OBSTRUCTION/LOWER URINARY TRACT SYMPTOMS: Primary | ICD-10-CM

## 2023-11-02 LAB
BILIRUB BLD-MCNC: NEGATIVE MG/DL
CLARITY, POC: ABNORMAL
COLOR UR: YELLOW
EXPIRATION DATE: ABNORMAL
GLUCOSE UR STRIP-MCNC: NEGATIVE MG/DL
KETONES UR QL: NEGATIVE
LEUKOCYTE EST, POC: ABNORMAL
Lab: ABNORMAL
NITRITE UR-MCNC: NEGATIVE MG/ML
PH UR: 6 [PH] (ref 5–8)
PROT UR STRIP-MCNC: ABNORMAL MG/DL
RBC # UR STRIP: ABNORMAL /UL
SP GR UR: 1.02 (ref 1–1.03)
UROBILINOGEN UR QL: NORMAL

## 2023-11-02 RX ORDER — HYDROCHLOROTHIAZIDE 25 MG/1
25 TABLET ORAL DAILY
Qty: 90 TABLET | Refills: 2 | Status: SHIPPED | OUTPATIENT
Start: 2023-11-02

## 2023-11-02 NOTE — PROGRESS NOTES
Follow Up Office Visit      Patient Name: Justus Bishop  : 1950   MRN: [id number]     Chief Complaint:    Chief Complaint   Patient presents with    Gross Hematuria    Benign Prostatic Hypertrophy     Obstruction/LUTS         Referring Provider: No ref. provider found    History of Present Illness: Justus Bishop is a 73 y.o. male who presents today for follow up of extreme BPH with urinary obstruction.  Patient had significant urinary symptoms and was found to have extreme BPH at 150 g on work-up.  He underwent aqua ablation 2023.  He returns today in postoperative follow-up at 6 weeks.  He is doing well.  Reports a strong stream.  IPSS 0.  Complete resolution of symptoms noted.  Denies hematuria.  Denies UTI symptoms.  He has trace blood and leukocytes on UA likely from persistent healing from his procedure.    States he has a history of testosterone deficiency, states he has previously been on testosterone injections.  States he feels fatigue and he is worried about his testosterone level.  Last checked 2022 at which point it was normal at 328.  We will recheck his testosterone per his request    Subjective      Review of System: Review of Systems   Constitutional:  Positive for fatigue.   Genitourinary:  Negative for decreased urine volume, difficulty urinating, dysuria, enuresis, flank pain, frequency, hematuria and urgency.      I have reviewed the ROS documented by my clinical staff, I have updated appropriately and I agree. Rob Esocbar MD    I have reviewed and the following portions of the patient's history were updated as appropriate: past family history, past medical history, past social history, past surgical history and problem list.    Medications:     Current Outpatient Medications:     albuterol sulfate  (90 Base) MCG/ACT inhaler, INHALE TWO PUFFS BY MOUTH EVERY 4 HOURS AS NEEDED FOR WHEEZING, Disp: 18 g, Rfl: 2    atorvastatin (LIPITOR) 20 MG  tablet, Take 1 tablet by mouth Daily., Disp: 90 tablet, Rfl: 1    Breo Ellipta 200-25 MCG/ACT inhaler, INHALE ONE DOSE BY MOUTH DAILY, Disp: 60 each, Rfl: 2    finasteride (PROSCAR) 5 MG tablet, Take 1 tablet by mouth Daily., Disp: 60 tablet, Rfl: 2    hydroCHLOROthiazide (HYDRODIURIL) 25 MG tablet, TAKE 1 TABLET BY MOUTH DAILY, Disp: 90 tablet, Rfl: 2    olmesartan (BENICAR) 40 MG tablet, Take 1 tablet by mouth Daily., Disp: 90 tablet, Rfl: 1    Allergies:   No Known Allergies    IPSS Questionnaire (AUA-7):  Over the past month…    1)  Incomplete Emptying:       How often have you had a sensation of not emptying you had the sensation of not emptying your bladder completely after you finished urinating?  0 - Not at all   2)  Frequency:       How often have you had the urinate again less than two hours after you finished urinating?  0 - Not at all   3)  Intermittency:       How often have you found you stopped and started again several times when you urinated?   0 - Not at all   4) Urgency:      How often have you found it difficult to postpone urination?  0 - Not at all   5) Weak Stream:      How often have you had a weak urinary stream?  0 - Not at all   6) Straining:       How often have you had to push or strain to begin urination?  0 - Not at all   7) Nocturia:      How many times did you most typically get up to urinate from the time you went to bed at night until the time you got up in the morning?  0 - None   Total Score:  0   The International Prostate Symptom Score (IPSS) is used to screen, diagnose, track symptoms of benign prostatic hyperplasia (BPH).   0-7 (Mild Symptoms) 8-19 (Moderate) 20-35 (Severe)   Quality of Life (QoL):  If you were to spend the rest of your life with your urinary condition just the way it is now, how would you feel about that? 0-Delighted   Urine Leakage (Incontinence) 0-No Leakage     Sexual Health Inventory for Men (MUSHTAQ)   Over the past 6 months:     1. How do you rate your  "confidence that you could get and keep an erection?  1 - Very low   2. When you had erections with sexual  stimulation, how often were your erections hard enough for penetration (entering your partner)?  0 - No Sexual Activity    3. During sexual intercourse, how often were you able to maintain your erection after you had penetrated (entered) your partner?  0 - Did not attempt intercourse   4. During sexual intercourse, how difficult was it to maintain your erection to completion of intercourse?  0 - Did not attempt intercourse   5. When you attempted sexual intercourse, how often was it satisfactory for you?  0 - Did not attempt intercourse    Total Score: 1   The Sexual Health Inventory for Men further classifies ED severity with the following breakpoints:   1-7 (Severe ED) 8-11 (Moderate ED) 12-16 (Mild to Moderate ED) 17-21 (Mild ED)      Post void residual bladder scan:   0 mL     Objective     Physical Exam:   Vital Signs:   Vitals:    11/02/23 1058   Pulse: 107   SpO2: 92%   Weight: 122 kg (268 lb)   Height: 180.3 cm (70.98\")   PainSc: 0-No pain     Body mass index is 37.4 kg/m².     Physical Exam  Constitutional:       Appearance: Normal appearance.   HENT:      Head: Normocephalic and atraumatic.      Nose: Nose normal.   Eyes:      Extraocular Movements: Extraocular movements intact.      Conjunctiva/sclera: Conjunctivae normal.      Pupils: Pupils are equal, round, and reactive to light.   Musculoskeletal:         General: Normal range of motion.      Cervical back: Normal range of motion and neck supple.   Skin:     General: Skin is warm and dry.      Findings: No lesion or rash.   Neurological:      General: No focal deficit present.      Mental Status: He is alert and oriented to person, place, and time. Mental status is at baseline.   Psychiatric:         Mood and Affect: Mood normal.         Behavior: Behavior normal.         Labs:   Brief Urine Lab Results  (Last result in the past 365 days)        " Color   Clarity   Blood   Leuk Est   Nitrite   Protein   CREAT   Urine HCG        11/02/23 1109 Yellow   Cloudy   3+   Large (3+)   Negative   1+                        Lab Results   Component Value Date    GLUCOSE 119 (H) 09/21/2023    CALCIUM 8.2 (L) 09/21/2023     09/21/2023    K 3.7 09/21/2023    CO2 24.0 09/21/2023     09/21/2023    BUN 14 09/21/2023    CREATININE 1.04 09/21/2023    EGFRIFAFRI 103 01/26/2022    EGFRIFNONA 85 01/26/2022    BCR 13.5 09/21/2023    ANIONGAP 11.0 09/21/2023       Lab Results   Component Value Date    WBC 21.02 (H) 09/21/2023    HGB 15.1 09/21/2023    HCT 43.2 09/21/2023    MCV 90.8 09/21/2023     09/21/2023       Images:   No Images in the past 120 days found..    Measures:   Tobacco:   Justus Bishop  reports that he quit smoking about 48 years ago. His smoking use included cigarettes. He started smoking about 53 years ago. He has a 2.50 pack-year smoking history. He has been exposed to tobacco smoke. He has never used smokeless tobacco.    Assessment / Plan      Assessment/Plan:   73 y.o. male who presented today for follow up of extreme BPH with urinary obstruction status post aqua ablation with complete resolution of symptoms.  PVR 0 mL.  Emptying well.  Doing very well.  We will stop tamsulosin.  He will continue finasteride to prevent prostatic bleeding and prostate regrowth long-term.  I will check a testosterone given his subjective fatigue and erectile issues.  He was previously on testosterone supplementation.  I will call him with results.  I will see him in 6 months.    Diagnoses and all orders for this visit:    1. BPH with obstruction/lower urinary tract symptoms (Primary)    - stop Flomax  - continue Finasteride    -     POC Urinalysis Dipstick, Automated    2. Other fatigue  -     Testosterone; Future    3. Erectile dysfunction due to arterial insufficiency  -     Testosterone; Future           Follow Up:   Return in about 6 months (around  5/2/2024).    I spent approximately 20 minutes providing clinical care for this patient; including review of patient's chart and provider documentation, face to face time spent with patient in examination room (obtaining history, performing physical exam, discussing diagnosis and management options), placing orders, and completing patient documentation.     Rob Escobar MD  [facility]

## 2024-01-02 ENCOUNTER — LAB (OUTPATIENT)
Dept: LAB | Facility: HOSPITAL | Age: 74
End: 2024-01-02
Payer: MEDICARE

## 2024-01-02 ENCOUNTER — OFFICE VISIT (OUTPATIENT)
Dept: FAMILY MEDICINE CLINIC | Facility: CLINIC | Age: 74
End: 2024-01-02
Payer: MEDICARE

## 2024-01-02 VITALS
WEIGHT: 276.2 LBS | RESPIRATION RATE: 21 BRPM | BODY MASS INDEX: 38.67 KG/M2 | OXYGEN SATURATION: 96 % | TEMPERATURE: 97.1 F | HEIGHT: 71 IN | HEART RATE: 92 BPM | SYSTOLIC BLOOD PRESSURE: 136 MMHG | DIASTOLIC BLOOD PRESSURE: 72 MMHG

## 2024-01-02 DIAGNOSIS — N52.01 ERECTILE DYSFUNCTION DUE TO ARTERIAL INSUFFICIENCY: ICD-10-CM

## 2024-01-02 DIAGNOSIS — I10 ESSENTIAL HYPERTENSION: ICD-10-CM

## 2024-01-02 DIAGNOSIS — R53.83 OTHER FATIGUE: ICD-10-CM

## 2024-01-02 DIAGNOSIS — E11.65 TYPE 2 DIABETES MELLITUS WITH HYPERGLYCEMIA, UNSPECIFIED WHETHER LONG TERM INSULIN USE: ICD-10-CM

## 2024-01-02 DIAGNOSIS — Z23 IMMUNIZATION DUE: Primary | ICD-10-CM

## 2024-01-02 LAB
EXPIRATION DATE: ABNORMAL
HBA1C MFR BLD: 6.9 % (ref 4.5–5.7)
Lab: ABNORMAL
TESTOST SERPL-MCNC: 330 NG/DL (ref 193–740)

## 2024-01-02 PROCEDURE — 36415 COLL VENOUS BLD VENIPUNCTURE: CPT

## 2024-01-02 PROCEDURE — 84403 ASSAY OF TOTAL TESTOSTERONE: CPT

## 2024-01-02 RX ORDER — SEMAGLUTIDE 1.34 MG/ML
0.25 INJECTION, SOLUTION SUBCUTANEOUS WEEKLY
Qty: 2 ML | Refills: 0 | Status: SHIPPED | OUTPATIENT
Start: 2024-01-02

## 2024-01-02 RX ORDER — KETOCONAZOLE 20 MG/ML
SHAMPOO TOPICAL WEEKLY
COMMUNITY
Start: 2023-11-27

## 2024-01-02 NOTE — PROGRESS NOTES
"Chief Complaint  Diabetes (Pt would like to discuss medications like ozempic. )    Diabetes      DM  A1c 6.9 he has tried diet and exercise but would like to consider ozempic.   He has been unable to control his DM with diet and exercise alone.   He tried metformin in the past but this failed.         Htn  Bp stable. He forgot his meds this am so bp is mildly high.       The following portions of the patient's history were reviewed and updated as appropriate: allergies, current medications, past family history, past medical history, past social history, past surgical history, and problem list.    OBJECTIVE:  /72   Pulse 92   Temp 97.1 °F (36.2 °C) (Temporal)   Resp 21   Ht 180.3 cm (70.98\")   Wt 125 kg (276 lb 3.2 oz)   SpO2 96%   BMI 38.54 kg/m²       Physical Exam  Constitutional:       General: He is not in acute distress.     Appearance: Normal appearance.   HENT:      Head: Normocephalic and atraumatic.   Eyes:      Extraocular Movements: Extraocular movements intact.   Cardiovascular:      Rate and Rhythm: Normal rate and regular rhythm.      Heart sounds: No murmur heard.  Pulmonary:      Effort: Pulmonary effort is normal. No respiratory distress.      Breath sounds: Normal breath sounds. No stridor. No wheezing, rhonchi or rales.   Skin:     Findings: No rash.   Neurological:      General: No focal deficit present.      Mental Status: He is alert.   Psychiatric:         Mood and Affect: Mood normal.                    Assessment and Plan   Diagnoses and all orders for this visit:    1. Type 2 diabetes mellitus with hyperglycemia, unspecified whether long term insulin use (Primary)  -     POC Glycosylated Hemoglobin (Hb A1C)    2. Essential hypertension    Other orders  -     Semaglutide,0.25 or 0.5MG/DOS, (Ozempic, 0.25 or 0.5 MG/DOSE,) 2 MG/1.5ML solution pen-injector; Inject 0.25 mg under the skin into the appropriate area as directed 1 (One) Time Per Week.  Dispense: 2 mL; Refill: " 0        Dm  Will start on ozempic given bmi of 38 failure of diet exercise and metformin.   Patient doesn't have a history of pancreatitis, family history of MEN syndrome, thyroid cancer, adrenal or pancreatic cancer, suicidal thoughts.. Discussed this medication can cause vomiting, nausea, constipation, diarrhea, belly pain. Discussed it can also cause pancreatitis, gastroparesis or other severe gi illness which would present as belly pain or vomiting. Advised to go to the er if this happens.       htn  Continue current regimen.           Return in about 3 months (around 4/2/2024).       Misty Mai D.O.  [facility]

## 2024-01-03 NOTE — PROGRESS NOTES
Please let patient know his testosterone is completely normal for his age and does not require any testosterone supplementation or treatment at this time.  Thanks    Rob Escobar MD

## 2024-01-18 ENCOUNTER — PRIOR AUTHORIZATION (OUTPATIENT)
Dept: FAMILY MEDICINE CLINIC | Facility: CLINIC | Age: 74
End: 2024-01-18
Payer: MEDICARE

## 2024-02-05 DIAGNOSIS — N13.8 BPH WITH OBSTRUCTION/LOWER URINARY TRACT SYMPTOMS: ICD-10-CM

## 2024-02-05 DIAGNOSIS — N40.1 BPH WITH OBSTRUCTION/LOWER URINARY TRACT SYMPTOMS: ICD-10-CM

## 2024-02-05 RX ORDER — FINASTERIDE 5 MG/1
5 TABLET, FILM COATED ORAL DAILY
Qty: 90 TABLET | Refills: 3 | Status: SHIPPED | OUTPATIENT
Start: 2024-02-05

## 2024-02-05 NOTE — TELEPHONE ENCOUNTER
Rx Refill Note  Requested Prescriptions     Pending Prescriptions Disp Refills    finasteride (PROSCAR) 5 MG tablet [Pharmacy Med Name: FINASTERIDE 5 MG TABLET] 90 tablet      Sig: TAKE ONE TABLET BY MOUTH DAILY      Last office visit with prescribing clinician: 11/2/2023   Last telemedicine visit with prescribing clinician: Visit date not found   Next office visit with prescribing clinician: 5/2/2024                         Would you like a call back once the refill request has been completed: [] Yes [] No    If the office needs to give you a call back, can they leave a voicemail: [] Yes [] No    Soniya Hardy MA  02/05/24, 08:46 EST

## 2024-02-19 RX ORDER — SEMAGLUTIDE 1.34 MG/ML
0.25 INJECTION, SOLUTION SUBCUTANEOUS WEEKLY
Qty: 2 ML | Refills: 0 | Status: SHIPPED | OUTPATIENT
Start: 2024-02-19 | End: 2024-02-20 | Stop reason: SDUPTHER

## 2024-02-20 RX ORDER — SEMAGLUTIDE 1.34 MG/ML
0.5 INJECTION, SOLUTION SUBCUTANEOUS WEEKLY
Qty: 2 ML | Refills: 0 | Status: SHIPPED | OUTPATIENT
Start: 2024-02-20

## 2024-03-25 RX ORDER — SEMAGLUTIDE 1.34 MG/ML
0.5 INJECTION, SOLUTION SUBCUTANEOUS WEEKLY
Qty: 2 ML | Refills: 0 | Status: SHIPPED | OUTPATIENT
Start: 2024-03-25

## 2024-03-25 RX ORDER — SEMAGLUTIDE 0.68 MG/ML
0.5 INJECTION, SOLUTION SUBCUTANEOUS WEEKLY
Qty: 3 ML | Refills: 0 | Status: SHIPPED | OUTPATIENT
Start: 2024-03-25

## 2024-04-04 ENCOUNTER — OFFICE VISIT (OUTPATIENT)
Dept: FAMILY MEDICINE CLINIC | Facility: CLINIC | Age: 74
End: 2024-04-04
Payer: MEDICARE

## 2024-04-04 VITALS
WEIGHT: 260.6 LBS | DIASTOLIC BLOOD PRESSURE: 64 MMHG | HEART RATE: 88 BPM | HEIGHT: 71 IN | TEMPERATURE: 98 F | SYSTOLIC BLOOD PRESSURE: 124 MMHG | BODY MASS INDEX: 36.48 KG/M2 | OXYGEN SATURATION: 98 % | RESPIRATION RATE: 20 BRPM

## 2024-04-04 DIAGNOSIS — I10 ESSENTIAL HYPERTENSION: ICD-10-CM

## 2024-04-04 DIAGNOSIS — E11.65 TYPE 2 DIABETES MELLITUS WITH HYPERGLYCEMIA, UNSPECIFIED WHETHER LONG TERM INSULIN USE: Primary | ICD-10-CM

## 2024-04-04 DIAGNOSIS — E55.9 VITAMIN D DEFICIENCY: ICD-10-CM

## 2024-04-04 DIAGNOSIS — E78.5 HYPERLIPIDEMIA LDL GOAL <100: ICD-10-CM

## 2024-04-04 LAB
EXPIRATION DATE: ABNORMAL
HBA1C MFR BLD: 5.8 % (ref 4.5–5.7)
Lab: ABNORMAL

## 2024-04-04 RX ORDER — ATORVASTATIN CALCIUM 20 MG/1
20 TABLET, FILM COATED ORAL DAILY
Qty: 90 TABLET | Refills: 1 | Status: SHIPPED | OUTPATIENT
Start: 2024-04-04

## 2024-04-04 RX ORDER — SEMAGLUTIDE 1.34 MG/ML
0.5 INJECTION, SOLUTION SUBCUTANEOUS WEEKLY
Qty: 2 ML | Refills: 3 | Status: SHIPPED | OUTPATIENT
Start: 2024-04-04

## 2024-04-04 NOTE — PROGRESS NOTES
"Chief Complaint  Diabetes (Diabetes/hypertension 3 month follow up)    Diabetes        Dm  He loves the ozempic. It works well for him. He has lost weight . No unwanted side effects other than he feels it may make him sleepy the first couple days. This is tolerable.      Htn  Bp improved.       Hld  Tolerating statin but has some mild muscle aches    The following portions of the patient's history were reviewed and updated as appropriate: allergies, current medications, past family history, past medical history, past social history, past surgical history, and problem list.    OBJECTIVE:  /64 (BP Location: Right arm, Patient Position: Sitting, Cuff Size: Adult)   Pulse 88   Temp 98 °F (36.7 °C) (Temporal)   Resp 20   Ht 180.3 cm (70.98\")   Wt 118 kg (260 lb 9.6 oz)   SpO2 98%   BMI 36.37 kg/m²       Physical Exam  Constitutional:       General: He is not in acute distress.     Appearance: Normal appearance.   HENT:      Head: Normocephalic and atraumatic.   Eyes:      Extraocular Movements: Extraocular movements intact.   Cardiovascular:      Rate and Rhythm: Normal rate and regular rhythm.      Heart sounds: No murmur heard.  Pulmonary:      Effort: Pulmonary effort is normal. No respiratory distress.      Breath sounds: Normal breath sounds. No stridor. No wheezing, rhonchi or rales.   Skin:     Findings: No rash.   Neurological:      General: No focal deficit present.      Mental Status: He is alert.   Psychiatric:         Mood and Affect: Mood normal.                    Assessment and Plan   Diagnoses and all orders for this visit:    1. Type 2 diabetes mellitus with hyperglycemia, unspecified whether long term insulin use (Primary)  -     POC Glycosylated Hemoglobin (Hb A1C)    2. Vitamin D deficiency  -     Vitamin D,25-Hydroxy; Future    3. Essential hypertension  -     CBC (No Diff); Future  -     Comprehensive Metabolic Panel; Future    4. Hyperlipidemia LDL goal <100  -     Lipid Panel; " Future  -     TSH Rfx On Abnormal To Free T4; Future    Other orders  -     atorvastatin (LIPITOR) 20 MG tablet; Take 1 tablet by mouth Daily.  Dispense: 90 tablet; Refill: 1          Hld  Add in co q ten  Check labs       Dm  Ozempic.     Return for Next scheduled follow up.       Misty Mai D.O.  [facility]

## 2024-04-17 RX ORDER — OLMESARTAN MEDOXOMIL 40 MG/1
40 TABLET ORAL DAILY
Qty: 90 TABLET | Refills: 1 | Status: SHIPPED | OUTPATIENT
Start: 2024-04-17

## 2024-05-02 ENCOUNTER — TELEPHONE (OUTPATIENT)
Dept: UROLOGY | Facility: CLINIC | Age: 74
End: 2024-05-02

## 2024-05-02 NOTE — TELEPHONE ENCOUNTER
Provider: DR ACOSTA    Caller: SANTANA MCKINLEY    Relationship to Patient: SELF    Reason for Call: SAME DAY CANCEL-TRANSPORATION

## 2024-06-17 ENCOUNTER — TELEPHONE (OUTPATIENT)
Dept: FAMILY MEDICINE CLINIC | Facility: CLINIC | Age: 74
End: 2024-06-17

## 2024-06-17 NOTE — TELEPHONE ENCOUNTER
Caller: MAY - [facility]    Relationship:     Best call back number: [phone]     What is the best time to reach you: ANY TIME DURING BUSINESS HOURS    Who are you requesting to speak with (clinical staff, provider,  specific staff member): DR SANDS    Do you know the name of the person who called: MAY    What was the call regarding: RX ALREADY BEEN APPROVED NEED CO-PAY ACCEPT ION AND PHARMACY NEEDS ANSWER TO CLINICAL QUESTION. PLEASE CALL BACK.

## 2024-08-12 RX ORDER — HYDROCHLOROTHIAZIDE 25 MG/1
25 TABLET ORAL DAILY
Qty: 90 TABLET | Refills: 2 | Status: SHIPPED | OUTPATIENT
Start: 2024-08-12

## 2024-09-16 ENCOUNTER — OFFICE VISIT (OUTPATIENT)
Dept: FAMILY MEDICINE CLINIC | Facility: CLINIC | Age: 74
End: 2024-09-16
Payer: MEDICARE

## 2024-09-16 ENCOUNTER — LAB (OUTPATIENT)
Dept: LAB | Facility: HOSPITAL | Age: 74
End: 2024-09-16
Payer: MEDICARE

## 2024-09-16 VITALS
RESPIRATION RATE: 20 BRPM | HEART RATE: 67 BPM | OXYGEN SATURATION: 95 % | WEIGHT: 246.8 LBS | TEMPERATURE: 98 F | BODY MASS INDEX: 34.55 KG/M2 | DIASTOLIC BLOOD PRESSURE: 62 MMHG | SYSTOLIC BLOOD PRESSURE: 108 MMHG | HEIGHT: 71 IN

## 2024-09-16 DIAGNOSIS — I10 ESSENTIAL HYPERTENSION: ICD-10-CM

## 2024-09-16 DIAGNOSIS — T88.7XXA MEDICATION SIDE EFFECT: ICD-10-CM

## 2024-09-16 DIAGNOSIS — R73.09 ELEVATED HEMOGLOBIN A1C: Primary | ICD-10-CM

## 2024-09-16 DIAGNOSIS — I95.9 HYPOTENSION, UNSPECIFIED HYPOTENSION TYPE: ICD-10-CM

## 2024-09-16 DIAGNOSIS — R73.09 ELEVATED HEMOGLOBIN A1C: ICD-10-CM

## 2024-09-16 DIAGNOSIS — E55.9 VITAMIN D DEFICIENCY: ICD-10-CM

## 2024-09-16 DIAGNOSIS — E78.5 HYPERLIPIDEMIA LDL GOAL <100: ICD-10-CM

## 2024-09-16 LAB
25(OH)D3 SERPL-MCNC: 30.8 NG/ML (ref 30–100)
ALBUMIN SERPL-MCNC: 4 G/DL (ref 3.5–5.2)
ALBUMIN/GLOB SERPL: 1.2 G/DL
ALP SERPL-CCNC: 62 U/L (ref 39–117)
ALT SERPL W P-5'-P-CCNC: 16 U/L (ref 1–41)
ANION GAP SERPL CALCULATED.3IONS-SCNC: 10.1 MMOL/L (ref 5–15)
AST SERPL-CCNC: 19 U/L (ref 1–40)
BILIRUB SERPL-MCNC: 0.6 MG/DL (ref 0–1.2)
BUN SERPL-MCNC: 20 MG/DL (ref 8–23)
BUN/CREAT SERPL: 12.3 (ref 7–25)
CALCIUM SPEC-SCNC: 10.1 MG/DL (ref 8.6–10.5)
CHLORIDE SERPL-SCNC: 102 MMOL/L (ref 98–107)
CHOLEST SERPL-MCNC: 117 MG/DL (ref 0–200)
CO2 SERPL-SCNC: 26.9 MMOL/L (ref 22–29)
CREAT SERPL-MCNC: 1.63 MG/DL (ref 0.76–1.27)
DEPRECATED RDW RBC AUTO: 46.8 FL (ref 37–54)
EGFRCR SERPLBLD CKD-EPI 2021: 43.9 ML/MIN/1.73
ERYTHROCYTE [DISTWIDTH] IN BLOOD BY AUTOMATED COUNT: 13.9 % (ref 12.3–15.4)
GLOBULIN UR ELPH-MCNC: 3.3 GM/DL
GLUCOSE SERPL-MCNC: 91 MG/DL (ref 65–99)
HBA1C MFR BLD: 5.9 % (ref 4.8–5.6)
HCT VFR BLD AUTO: 48.9 % (ref 37.5–51)
HDLC SERPL-MCNC: 31 MG/DL (ref 40–60)
HGB BLD-MCNC: 16.8 G/DL (ref 13–17.7)
LDLC SERPL CALC-MCNC: 69 MG/DL (ref 0–100)
LDLC/HDLC SERPL: 2.23 {RATIO}
MCH RBC QN AUTO: 32 PG (ref 26.6–33)
MCHC RBC AUTO-ENTMCNC: 34.4 G/DL (ref 31.5–35.7)
MCV RBC AUTO: 93.1 FL (ref 79–97)
PLATELET # BLD AUTO: 317 10*3/MM3 (ref 140–450)
PMV BLD AUTO: 9.4 FL (ref 6–12)
POTASSIUM SERPL-SCNC: 4.1 MMOL/L (ref 3.5–5.2)
PROT SERPL-MCNC: 7.3 G/DL (ref 6–8.5)
RBC # BLD AUTO: 5.25 10*6/MM3 (ref 4.14–5.8)
SODIUM SERPL-SCNC: 139 MMOL/L (ref 136–145)
TRIGL SERPL-MCNC: 84 MG/DL (ref 0–150)
TSH SERPL DL<=0.05 MIU/L-ACNC: 1.74 UIU/ML (ref 0.27–4.2)
VLDLC SERPL-MCNC: 17 MG/DL (ref 5–40)
WBC NRBC COR # BLD AUTO: 9.75 10*3/MM3 (ref 3.4–10.8)

## 2024-09-16 PROCEDURE — 80061 LIPID PANEL: CPT

## 2024-09-16 PROCEDURE — 99214 OFFICE O/P EST MOD 30 MIN: CPT | Performed by: STUDENT IN AN ORGANIZED HEALTH CARE EDUCATION/TRAINING PROGRAM

## 2024-09-16 PROCEDURE — 1170F FXNL STATUS ASSESSED: CPT | Performed by: STUDENT IN AN ORGANIZED HEALTH CARE EDUCATION/TRAINING PROGRAM

## 2024-09-16 PROCEDURE — 3078F DIAST BP <80 MM HG: CPT | Performed by: STUDENT IN AN ORGANIZED HEALTH CARE EDUCATION/TRAINING PROGRAM

## 2024-09-16 PROCEDURE — 36415 COLL VENOUS BLD VENIPUNCTURE: CPT

## 2024-09-16 PROCEDURE — 80053 COMPREHEN METABOLIC PANEL: CPT

## 2024-09-16 PROCEDURE — G0439 PPPS, SUBSEQ VISIT: HCPCS | Performed by: STUDENT IN AN ORGANIZED HEALTH CARE EDUCATION/TRAINING PROGRAM

## 2024-09-16 PROCEDURE — 3074F SYST BP LT 130 MM HG: CPT | Performed by: STUDENT IN AN ORGANIZED HEALTH CARE EDUCATION/TRAINING PROGRAM

## 2024-09-16 PROCEDURE — 82306 VITAMIN D 25 HYDROXY: CPT

## 2024-09-16 PROCEDURE — 84443 ASSAY THYROID STIM HORMONE: CPT

## 2024-09-16 PROCEDURE — 83036 HEMOGLOBIN GLYCOSYLATED A1C: CPT

## 2024-09-16 PROCEDURE — 3044F HG A1C LEVEL LT 7.0%: CPT | Performed by: STUDENT IN AN ORGANIZED HEALTH CARE EDUCATION/TRAINING PROGRAM

## 2024-09-16 PROCEDURE — 1126F AMNT PAIN NOTED NONE PRSNT: CPT | Performed by: STUDENT IN AN ORGANIZED HEALTH CARE EDUCATION/TRAINING PROGRAM

## 2024-09-16 PROCEDURE — 85027 COMPLETE CBC AUTOMATED: CPT

## 2024-09-16 RX ORDER — HYDROCHLOROTHIAZIDE 12.5 MG/1
12.5 TABLET ORAL DAILY
Qty: 90 TABLET | Refills: 0 | Status: SHIPPED | OUTPATIENT
Start: 2024-09-16 | End: 2024-09-19

## 2024-09-19 ENCOUNTER — OFFICE VISIT (OUTPATIENT)
Dept: CARDIOLOGY | Facility: CLINIC | Age: 74
End: 2024-09-19
Payer: MEDICARE

## 2024-09-19 VITALS
HEIGHT: 72 IN | OXYGEN SATURATION: 91 % | WEIGHT: 246.4 LBS | SYSTOLIC BLOOD PRESSURE: 82 MMHG | BODY MASS INDEX: 33.38 KG/M2 | DIASTOLIC BLOOD PRESSURE: 40 MMHG | HEART RATE: 77 BPM

## 2024-09-19 DIAGNOSIS — I10 ESSENTIAL HYPERTENSION: ICD-10-CM

## 2024-09-19 DIAGNOSIS — I51.89 DIASTOLIC DYSFUNCTION: Primary | ICD-10-CM

## 2024-09-19 DIAGNOSIS — E78.5 HYPERLIPIDEMIA LDL GOAL <100: ICD-10-CM

## 2024-09-19 RX ORDER — OLMESARTAN MEDOXOMIL 40 MG/1
20 TABLET ORAL DAILY
Status: SHIPPED
Start: 2024-09-19

## 2024-10-03 ENCOUNTER — LAB (OUTPATIENT)
Dept: LAB | Facility: HOSPITAL | Age: 74
End: 2024-10-03
Payer: MEDICARE

## 2024-10-03 ENCOUNTER — OFFICE VISIT (OUTPATIENT)
Dept: FAMILY MEDICINE CLINIC | Facility: CLINIC | Age: 74
End: 2024-10-03
Payer: MEDICARE

## 2024-10-03 VITALS
BODY MASS INDEX: 32.4 KG/M2 | HEART RATE: 80 BPM | WEIGHT: 239.2 LBS | TEMPERATURE: 98 F | SYSTOLIC BLOOD PRESSURE: 132 MMHG | RESPIRATION RATE: 20 BRPM | DIASTOLIC BLOOD PRESSURE: 62 MMHG | HEIGHT: 72 IN

## 2024-10-03 DIAGNOSIS — N17.9 AKI (ACUTE KIDNEY INJURY): Primary | ICD-10-CM

## 2024-10-03 DIAGNOSIS — I10 ESSENTIAL HYPERTENSION: ICD-10-CM

## 2024-10-03 PROCEDURE — 99214 OFFICE O/P EST MOD 30 MIN: CPT | Performed by: STUDENT IN AN ORGANIZED HEALTH CARE EDUCATION/TRAINING PROGRAM

## 2024-10-03 PROCEDURE — 3075F SYST BP GE 130 - 139MM HG: CPT | Performed by: STUDENT IN AN ORGANIZED HEALTH CARE EDUCATION/TRAINING PROGRAM

## 2024-10-03 PROCEDURE — 3078F DIAST BP <80 MM HG: CPT | Performed by: STUDENT IN AN ORGANIZED HEALTH CARE EDUCATION/TRAINING PROGRAM

## 2024-10-03 PROCEDURE — 3044F HG A1C LEVEL LT 7.0%: CPT | Performed by: STUDENT IN AN ORGANIZED HEALTH CARE EDUCATION/TRAINING PROGRAM

## 2024-10-03 PROCEDURE — 1126F AMNT PAIN NOTED NONE PRSNT: CPT | Performed by: STUDENT IN AN ORGANIZED HEALTH CARE EDUCATION/TRAINING PROGRAM

## 2024-10-03 PROCEDURE — 36415 COLL VENOUS BLD VENIPUNCTURE: CPT | Performed by: STUDENT IN AN ORGANIZED HEALTH CARE EDUCATION/TRAINING PROGRAM

## 2024-10-03 PROCEDURE — 80048 BASIC METABOLIC PNL TOTAL CA: CPT | Performed by: STUDENT IN AN ORGANIZED HEALTH CARE EDUCATION/TRAINING PROGRAM

## 2024-10-03 NOTE — PROGRESS NOTES
"Chief Complaint  kidney injury (Fu. Pt states he has been having nausea but thinks it was due to fasting. Pt states it was twice in the past 3 weeks. )    History of Present Illness      Tomi  He had not been eating well on the glp 1 agonist and felt that could be the cause of his symptoms. He has been eating again now but he typically eats about one meal per day , smoothie and sandwich.   He reports normal urination, no incomplete void.   He had also been taking \"too much ibuprofen\" for sciatica.       Htn  Reviewed cardiology note. He's doing okay off hctz and cut the olmesartan in half. He has his readings with him today.     He reports feeling well and having no complaints today.     The following portions of the patient's history were reviewed and updated as appropriate: allergies, current medications, past family history, past medical history, past social history, past surgical history, and problem list.    OBJECTIVE:  /62   Pulse 80   Temp 98 °F (36.7 °C) (Temporal)   Resp 20   Ht 182.9 cm (72.01\")   Wt 109 kg (239 lb 3.2 oz)   BMI 32.43 kg/m²       Physical Exam  Constitutional:       General: He is not in acute distress.     Appearance: Normal appearance.   HENT:      Head: Normocephalic and atraumatic.   Eyes:      Extraocular Movements: Extraocular movements intact.   Cardiovascular:      Rate and Rhythm: Normal rate and regular rhythm.      Heart sounds: No murmur heard.  Pulmonary:      Effort: Pulmonary effort is normal. No respiratory distress.      Breath sounds: Normal breath sounds. No stridor. No wheezing, rhonchi or rales.   Skin:     Findings: No rash.   Neurological:      General: No focal deficit present.      Mental Status: He is alert.   Psychiatric:         Mood and Affect: Mood normal.                    Assessment and Plan   Diagnoses and all orders for this visit:    1. TOMI (acute kidney injury) (Primary)  -     Basic metabolic panel; Future    2. Essential " hypertension        Htn  Continue current regimen and follow up in two weeks. Continue to monitor bp at home. He is aware of goal readings.       Sciatica  We discussed referral to pain management for this. He will consider. He will cut back on nsaids and take tylenol instead.   He says his pain is okay today and declines xray.       Tomi  He will stop nsaid and hydrate.   Recheck.         Return in about 2 weeks (around 10/17/2024).       Misty Mai D.O.  [facility]

## 2024-10-04 LAB
ANION GAP SERPL CALCULATED.3IONS-SCNC: 12 MMOL/L (ref 5–15)
BUN SERPL-MCNC: 9 MG/DL (ref 8–23)
BUN/CREAT SERPL: 8.2 (ref 7–25)
CALCIUM SPEC-SCNC: 9.7 MG/DL (ref 8.6–10.5)
CHLORIDE SERPL-SCNC: 105 MMOL/L (ref 98–107)
CO2 SERPL-SCNC: 22 MMOL/L (ref 22–29)
CREAT SERPL-MCNC: 1.1 MG/DL (ref 0.76–1.27)
EGFRCR SERPLBLD CKD-EPI 2021: 70.4 ML/MIN/1.73
GLUCOSE SERPL-MCNC: 80 MG/DL (ref 65–99)
POTASSIUM SERPL-SCNC: 3.8 MMOL/L (ref 3.5–5.2)
SODIUM SERPL-SCNC: 139 MMOL/L (ref 136–145)

## 2024-10-07 RX ORDER — SEMAGLUTIDE 1.34 MG/ML
0.5 INJECTION, SOLUTION SUBCUTANEOUS WEEKLY
Qty: 2 ML | Refills: 3 | Status: SHIPPED | OUTPATIENT
Start: 2024-10-07

## 2024-10-07 NOTE — TELEPHONE ENCOUNTER
"Caller: Justus Bishop Everette \"Jordan\"    Relationship: Self    Best call back number: [phone]     Requested Prescriptions:   Requested Prescriptions     Pending Prescriptions Disp Refills    Semaglutide,0.25 or 0.5MG/DOS, (Ozempic, 0.25 or 0.5 MG/DOSE,) 2 MG/1.5ML solution pen-injector 2 mL 3     Sig: Inject 0.5 mg under the skin into the appropriate area as directed 1 (One) Time Per Week.        Pharmacy where request should be sent: [facility] [id number] [facility] [phone] [facility] [phone] FX     Last office visit with prescribing clinician: 10/3/2024   Last telemedicine visit with prescribing clinician: Visit date not found   Next office visit with prescribing clinician: 10/19/2024     Additional details provided by patient: OUT OF MEDICATION. NEXT SHOT IS DUE WEDNESDAY. PATIENT STATED THIS NEEDS TO BE FOR DOSAGE 0.5 MG     Does the patient have less than a 3 day supply:  [x] Yes      Would you like a call back once the refill request has been completed: [] Yes [x] No    If the office needs to give you a call back, can they leave a voicemail: [] Yes [x] No    Cornelia Ruiz MA   10/07/24 12:26 EDT         "

## 2024-10-09 ENCOUNTER — TELEPHONE (OUTPATIENT)
Dept: FAMILY MEDICINE CLINIC | Facility: CLINIC | Age: 74
End: 2024-10-09
Payer: MEDICARE

## 2024-10-09 NOTE — TELEPHONE ENCOUNTER
Please let the patient know his kidney function returned to normal.     Called patient and he voiced understanding.

## 2024-10-19 ENCOUNTER — OFFICE VISIT (OUTPATIENT)
Dept: FAMILY MEDICINE CLINIC | Facility: CLINIC | Age: 74
End: 2024-10-19
Payer: MEDICARE

## 2024-10-19 VITALS
WEIGHT: 234 LBS | RESPIRATION RATE: 20 BRPM | TEMPERATURE: 98.2 F | BODY MASS INDEX: 31.69 KG/M2 | OXYGEN SATURATION: 95 % | HEART RATE: 67 BPM | SYSTOLIC BLOOD PRESSURE: 100 MMHG | DIASTOLIC BLOOD PRESSURE: 62 MMHG | HEIGHT: 72 IN

## 2024-10-19 DIAGNOSIS — R73.09 ELEVATED HEMOGLOBIN A1C: ICD-10-CM

## 2024-10-19 DIAGNOSIS — I10 ESSENTIAL HYPERTENSION: ICD-10-CM

## 2024-10-19 DIAGNOSIS — M54.41 CHRONIC BILATERAL LOW BACK PAIN WITH RIGHT-SIDED SCIATICA: ICD-10-CM

## 2024-10-19 DIAGNOSIS — M48.062 SPINAL STENOSIS OF LUMBAR REGION WITH NEUROGENIC CLAUDICATION: Primary | ICD-10-CM

## 2024-10-19 DIAGNOSIS — G89.29 CHRONIC BILATERAL LOW BACK PAIN WITH RIGHT-SIDED SCIATICA: ICD-10-CM

## 2024-10-19 DIAGNOSIS — N17.9 AKI (ACUTE KIDNEY INJURY): ICD-10-CM

## 2024-10-19 PROBLEM — M48.061 SPINAL STENOSIS OF LUMBAR REGION: Status: ACTIVE | Noted: 2024-10-19

## 2024-10-19 PROCEDURE — 3044F HG A1C LEVEL LT 7.0%: CPT | Performed by: FAMILY MEDICINE

## 2024-10-19 PROCEDURE — 1160F RVW MEDS BY RX/DR IN RCRD: CPT | Performed by: FAMILY MEDICINE

## 2024-10-19 PROCEDURE — 3074F SYST BP LT 130 MM HG: CPT | Performed by: FAMILY MEDICINE

## 2024-10-19 PROCEDURE — 1159F MED LIST DOCD IN RCRD: CPT | Performed by: FAMILY MEDICINE

## 2024-10-19 PROCEDURE — 1126F AMNT PAIN NOTED NONE PRSNT: CPT | Performed by: FAMILY MEDICINE

## 2024-10-19 PROCEDURE — G2211 COMPLEX E/M VISIT ADD ON: HCPCS | Performed by: FAMILY MEDICINE

## 2024-10-19 PROCEDURE — 99214 OFFICE O/P EST MOD 30 MIN: CPT | Performed by: FAMILY MEDICINE

## 2024-10-19 PROCEDURE — 3078F DIAST BP <80 MM HG: CPT | Performed by: FAMILY MEDICINE

## 2024-10-19 RX ORDER — TRAMADOL HYDROCHLORIDE 50 MG/1
50 TABLET ORAL EVERY 12 HOURS PRN
Qty: 60 TABLET | Refills: 0 | Status: SHIPPED | OUTPATIENT
Start: 2024-10-19

## 2024-10-19 NOTE — PROGRESS NOTES
Subjective     Chief Complaint  Hypertension (2 wk fu) and acute kidney injury (2 wk fu)    Subjective          Justus Bishop is a 74 y.o. male who presents today to [facility] for follow up.    HPI:   Hypertension        Mr. Bishop is a very pleasant 74 year old male who presents today to follow up on hypertension and JAMES.     His blood pressure in office today is 100/62 - he is currently taking Benicar 40 mg daily and has stopped his diuretic as well. His blood pressure log is showing signs of well controlled blood pressure.     He also has a history of type 2 DM - he has taken Ozempic 0.5 mg weekly for about one year. He has lost 50 lbs with this. His last HgA1c was 5.9%  This was in September.     He also notes that he was taking an increased amount of aleve due to having increase back pain that he has had chronically. In years past, he took chronic pain medication, Norco, due to the pain. He tapered himself off a few years ago. He is having trouble with this chronic pain still this is why he was taking an increase in amount of aleve.     The following portions of the patient's history were reviewed and updated as appropriate: allergies, current medications, past family history, past medical history, past social history, past surgical history and problem list.    Objective     Objective     Allergy:   No Known Allergies     Current Medications:   Current Outpatient Medications   Medication Sig Dispense Refill    albuterol sulfate  (90 Base) MCG/ACT inhaler INHALE TWO PUFFS BY MOUTH EVERY 4 HOURS AS NEEDED FOR WHEEZING 18 g 2    atorvastatin (LIPITOR) 20 MG tablet Take 1 tablet by mouth Daily. 90 tablet 1    Breo Ellipta 200-25 MCG/ACT inhaler INHALE ONE DOSE BY MOUTH DAILY 60 each 2    finasteride (PROSCAR) 5 MG tablet TAKE ONE TABLET BY MOUTH DAILY 90 tablet 3    ketoconazole (NIZORAL) 2 % shampoo Apply  topically to the appropriate area as directed 1  (One) Time Per Week.      olmesartan (BENICAR) 40 MG tablet Take 0.5 tablets by mouth Daily.      Semaglutide,0.25 or 0.5MG/DOS, (Ozempic, 0.25 or 0.5 MG/DOSE,) 2 MG/1.5ML solution pen-injector Inject 0.5 mg under the skin into the appropriate area as directed 1 (One) Time Per Week. 2 mL 3    traMADol (ULTRAM) 50 MG tablet Take 1 tablet by mouth Every 12 (Twelve) Hours As Needed for Moderate Pain. 60 tablet 0     No current facility-administered medications for this visit.       Past Medical History:  Past Medical History:   Diagnosis Date    Ankle sprain 1985    Anxiety 2019    Asthma 2016    Benign prostatic hyperplasia 2017    BPH with obstruction/lower urinary tract symptoms 08/22/2023    Cataract 2006    Repaired    Clotting disorder 2020    Intermittent (2 times, once recent) pt deniesa ever having clotting disorder    COPD (chronic obstructive pulmonary disease)     Depression 2019    Diabetes mellitus 2/8/23    Frozen shoulder     Hip arthrosis 2019    History of staph infection     folowing injury/ systemic poison oak    HL (hearing loss) 2000    Hormone disorder 2018    Low Testosterone    Hyperlipidemia LDL goal <100 07/21/2022    Hypertension 2018    Knee swelling 1980    Periarthritis of shoulder 2021    Polycythemia     with significant infection ( skin) had therapeutic phlebitis    Rotator cuff syndrome 2020    S/P cystoscopy with aquablation 09/20/2023    Substance abuse 2019    resolved    Tear of meniscus of knee 1985    Tendinitis of knee 1985    Tremor 2019       Past Surgical History:  Past Surgical History:   Procedure Laterality Date    CARDIAC CATHETERIZATION  2016    clear    COLONOSCOPY      EYE SURGERY  2006    cataract    KNEE SURGERY  1985    ACL reconstruction    PROSTATE AQUABLATION N/A 09/20/2023    Procedure: CYSTOSCOPY, AQUABLATION WATER JET THERAPY FOR BPH;  Surgeon: Rob Escobar MD;  Location: [facility];  Service: [facility] - Urology;  Laterality: N/A;    PROSTATE SURGERY    "      Social History:  Social History     Socioeconomic History    Marital status:    Tobacco Use    Smoking status: Former     Current packs/day: 0.00     Average packs/day: 0.7 packs/day for 7.5 years (5.0 ttl pk-yrs)     Types: Cigarettes     Start date: 1970     Quit date: 1975     Years since quittin.8     Passive exposure: Past    Smokeless tobacco: Never   Vaping Use    Vaping status: Never Used   Substance and Sexual Activity    Alcohol use: Not Currently     Comment: 0-3x/daily; <6x/week    Drug use: Not Currently     Types: Marijuana     Comment: was part of medical marijuana study in [facility] in     Sexual activity: Not Currently     Partners: Female       Family History:  Family History   Problem Relation Age of Onset    Depression Mother     Kidney disease Mother     Kidney cancer Mother     Cancer Father         Multiple Myeloma    Early death Father     Heart disease Father     Hypertension Sister     Heart disease Sister     Pulmonary fibrosis Sister        Vital Signs:   /62   Pulse 67   Temp 98.2 °F (36.8 °C) (Temporal)   Resp 20   Ht 182.9 cm (72.01\")   Wt 106 kg (234 lb)   SpO2 95%   BMI 31.73 kg/m²      Physical Exam:  Physical Exam  Vitals reviewed.   Constitutional:       Appearance: He is not ill-appearing.   Cardiovascular:      Rate and Rhythm: Normal rate.      Pulses: Normal pulses.   Pulmonary:      Effort: Pulmonary effort is normal.      Breath sounds: Normal breath sounds.   Neurological:      General: No focal deficit present.      Mental Status: He is alert. Mental status is at baseline.   Psychiatric:         Mood and Affect: Mood normal.         Behavior: Behavior normal.         Thought Content: Thought content normal.         Judgment: Judgment normal.               PHQ-9 Score  PHQ-9 Total Score:      Lab Review  Office Visit on 10/03/2024   Component Date Value Ref Range Status    Glucose 10/03/2024 80  65 - 99 mg/dL Final    BUN 10/03/2024 " 9  8 - 23 mg/dL Final    Creatinine 10/03/2024 1.10  0.76 - 1.27 mg/dL Final    Sodium 10/03/2024 139  136 - 145 mmol/L Final    Potassium 10/03/2024 3.8  3.5 - 5.2 mmol/L Final    Chloride 10/03/2024 105  98 - 107 mmol/L Final    CO2 10/03/2024 22.0  22.0 - 29.0 mmol/L Final    Calcium 10/03/2024 9.7  8.6 - 10.5 mg/dL Final    BUN/Creatinine Ratio 10/03/2024 8.2  7.0 - 25.0 Final    Anion Gap 10/03/2024 12.0  5.0 - 15.0 mmol/L Final    eGFR 10/03/2024 70.4  >60.0 mL/min/1.73 Final   Lab on 09/16/2024   Component Date Value Ref Range Status    WBC 09/16/2024 9.75  3.40 - 10.80 10*3/mm3 Final    RBC 09/16/2024 5.25  4.14 - 5.80 10*6/mm3 Final    Hemoglobin 09/16/2024 16.8  13.0 - 17.7 g/dL Final    Hematocrit 09/16/2024 48.9  37.5 - 51.0 % Final    MCV 09/16/2024 93.1  79.0 - 97.0 fL Final    MCH 09/16/2024 32.0  26.6 - 33.0 pg Final    MCHC 09/16/2024 34.4  31.5 - 35.7 g/dL Final    RDW 09/16/2024 13.9  12.3 - 15.4 % Final    RDW-SD 09/16/2024 46.8  37.0 - 54.0 fl Final    MPV 09/16/2024 9.4  6.0 - 12.0 fL Final    Platelets 09/16/2024 317  140 - 450 10*3/mm3 Final    Glucose 09/16/2024 91  65 - 99 mg/dL Final    BUN 09/16/2024 20  8 - 23 mg/dL Final    Creatinine 09/16/2024 1.63 (H)  0.76 - 1.27 mg/dL Final    Sodium 09/16/2024 139  136 - 145 mmol/L Final    Potassium 09/16/2024 4.1  3.5 - 5.2 mmol/L Final    Chloride 09/16/2024 102  98 - 107 mmol/L Final    CO2 09/16/2024 26.9  22.0 - 29.0 mmol/L Final    Calcium 09/16/2024 10.1  8.6 - 10.5 mg/dL Final    Total Protein 09/16/2024 7.3  6.0 - 8.5 g/dL Final    Albumin 09/16/2024 4.0  3.5 - 5.2 g/dL Final    ALT (SGPT) 09/16/2024 16  1 - 41 U/L Final    AST (SGOT) 09/16/2024 19  1 - 40 U/L Final    Alkaline Phosphatase 09/16/2024 62  39 - 117 U/L Final    Total Bilirubin 09/16/2024 0.6  0.0 - 1.2 mg/dL Final    Globulin 09/16/2024 3.3  gm/dL Final    A/G Ratio 09/16/2024 1.2  g/dL Final    BUN/Creatinine Ratio 09/16/2024 12.3  7.0 - 25.0 Final    Anion Gap 09/16/2024  10.1  5.0 - 15.0 mmol/L Final    eGFR 09/16/2024 43.9 (L)  >60.0 mL/min/1.73 Final    Total Cholesterol 09/16/2024 117  0 - 200 mg/dL Final    Triglycerides 09/16/2024 84  0 - 150 mg/dL Final    HDL Cholesterol 09/16/2024 31 (L)  40 - 60 mg/dL Final    LDL Cholesterol  09/16/2024 69  0 - 100 mg/dL Final    VLDL Cholesterol 09/16/2024 17  5 - 40 mg/dL Final    LDL/HDL Ratio 09/16/2024 2.23   Final    TSH 09/16/2024 1.740  0.270 - 4.200 uIU/mL Final    25 Hydroxy, Vitamin D 09/16/2024 30.8  30.0 - 100.0 ng/ml Final    Hemoglobin A1C 09/16/2024 5.90 (H)  4.80 - 5.60 % Final        Radiology Results        Assessment / Plan         Assessment and Plan trama  Diagnoses and all orders for this visit:    1. Spinal stenosis of lumbar region with neurogenic claudication (Primary)  -     MRI Lumbar Spine Without Contrast; Future  -     traMADol (ULTRAM) 50 MG tablet; Take 1 tablet by mouth Every 12 (Twelve) Hours As Needed for Moderate Pain.  Dispense: 60 tablet; Refill: 0    2. Essential hypertension  Assessment & Plan:  Hypertension is stable and controlled  Continue current treatment regimen.  Blood pressure will be reassessed in 6 months.    Target BP <140/90       3. Elevated hemoglobin A1c, in diabetic range  Assessment & Plan:  HgA1c was 5.9% - tolerating Ozempic well.       4. JAMES (acute kidney injury)  Assessment & Plan:  Likely related to NSAID and diuretic use.   His last renal function was improved.   Will repeat to ensure stability     Orders:  -     Basic metabolic panel; Future    5. Chronic bilateral low back pain with right-sided sciatica  Assessment & Plan:  Related to chronic spinal stenosis   Followed with pain management for a number of years.   He hasn't had imaging in a number of years.   Through pain management he underwent a number of injections.  Cannot tolerate NSAIDs due to renal function   MRI lumbar spine ordered to evaluate worsening spinal stenosis given the worsening of pain and that it is  affecting his ADLs   Trial of Tramadol ordered for him due to worsening pain. If improved, will sign controlled agreement etc.     WILLY query complete. Treatment plan to include limited course of prescribed  controlled substance. Risks including addiction, benefits, and alternatives presented to patient.       Orders:  -     MRI Lumbar Spine Without Contrast; Future  -     traMADol (ULTRAM) 50 MG tablet; Take 1 tablet by mouth Every 12 (Twelve) Hours As Needed for Moderate Pain.  Dispense: 60 tablet; Refill: 0        Discussed possible differential diagnoses, testing, treatment, recommended non-pharmacological interventions, risks, warning signs to monitor for that would indicate need for follow-up in clinic or ER. If no improvement with these regimens or you have new or worsening symptoms follow-up. Patient verbalizes understanding and agreement with plan of care. Denies further needs or concerns.     Patient was given instructions and counseling regarding her condition and for health maintenance advised.            BMI is >= 30 and <35. (Class 1 Obesity). The following options were offered after discussion;: weight loss educational material (shared in after visit summary)           Health Maintenance  Health Maintenance:   Health Maintenance Due   Topic Date Due    DIABETIC EYE EXAM  Never done    DIABETIC FOOT EXAM  Never done    URINE MICROALBUMIN  09/14/2024        Meds ordered during this visit  New Medications Ordered This Visit   Medications    traMADol (ULTRAM) 50 MG tablet     Sig: Take 1 tablet by mouth Every 12 (Twelve) Hours As Needed for Moderate Pain.     Dispense:  60 tablet     Refill:  0       Meds stopped during this visit:  There are no discontinued medications.     Visit Diagnoses    ICD-10-CM ICD-9-CM   1. Spinal stenosis of lumbar region with neurogenic claudication  M48.062 724.03   2. Essential hypertension  I10 401.9   3. Elevated hemoglobin A1c, in diabetic range  R73.09 790.29   4. JAMES  (acute kidney injury)  N17.9 584.9   5. Chronic bilateral low back pain with right-sided sciatica  M54.41 724.2    G89.29 724.3     338.29       Patient was given instructions and counseling regarding his condition or for health maintenance advice. Please see specific information pulled into the AVS if appropriate.     Follow Up   Return in about 4 weeks (around 11/16/2024) for followup JAMES, Chronic pain with PCP if not avaliable ok to schedule with me .      This document has been electronically signed by Catia Box DO   October 19, 2024 11:49 EDT    Dictated Utilizing Dragon Dictation: Part of this note may be an electronic transcription/translation of spoken language to printed text using the Dragon Dictation System.    Catia Box D.O.  [facility] [place] [facility]

## 2024-10-19 NOTE — ASSESSMENT & PLAN NOTE
Hypertension is stable and controlled  Continue current treatment regimen.  Blood pressure will be reassessed in 6 months.    Target BP <140/90

## 2024-10-19 NOTE — ASSESSMENT & PLAN NOTE
Related to chronic spinal stenosis   Followed with pain management for a number of years.   He hasn't had imaging in a number of years.   Through pain management he underwent a number of injections.  Cannot tolerate NSAIDs due to renal function   MRI lumbar spine ordered to evaluate worsening spinal stenosis given the worsening of pain and that it is affecting his ADLs   Trial of Tramadol ordered for him due to worsening pain. If improved, will sign controlled agreement etc.     WILLY query complete. Treatment plan to include limited course of prescribed  controlled substance. Risks including addiction, benefits, and alternatives presented to patient.

## 2024-10-19 NOTE — ASSESSMENT & PLAN NOTE
Likely related to NSAID and diuretic use.   His last renal function was improved.   Will repeat to ensure stability

## 2024-10-22 ENCOUNTER — LAB (OUTPATIENT)
Dept: LAB | Facility: HOSPITAL | Age: 74
End: 2024-10-22
Payer: MEDICARE

## 2024-10-22 DIAGNOSIS — N17.9 AKI (ACUTE KIDNEY INJURY): ICD-10-CM

## 2024-10-22 LAB
ANION GAP SERPL CALCULATED.3IONS-SCNC: 14.6 MMOL/L (ref 5–15)
BUN SERPL-MCNC: 11 MG/DL (ref 8–23)
BUN/CREAT SERPL: 9.6 (ref 7–25)
CALCIUM SPEC-SCNC: 9.6 MG/DL (ref 8.6–10.5)
CHLORIDE SERPL-SCNC: 100 MMOL/L (ref 98–107)
CO2 SERPL-SCNC: 24.4 MMOL/L (ref 22–29)
CREAT SERPL-MCNC: 1.14 MG/DL (ref 0.76–1.27)
EGFRCR SERPLBLD CKD-EPI 2021: 67.5 ML/MIN/1.73
GLUCOSE SERPL-MCNC: 67 MG/DL (ref 65–99)
POTASSIUM SERPL-SCNC: 3.7 MMOL/L (ref 3.5–5.2)
SODIUM SERPL-SCNC: 139 MMOL/L (ref 136–145)

## 2024-10-22 PROCEDURE — 80048 BASIC METABOLIC PNL TOTAL CA: CPT

## 2024-10-22 PROCEDURE — 36415 COLL VENOUS BLD VENIPUNCTURE: CPT

## 2024-10-28 RX ORDER — OLMESARTAN MEDOXOMIL 40 MG/1
20 TABLET ORAL DAILY
Start: 2024-10-28 | End: 2024-10-29 | Stop reason: SDUPTHER

## 2024-10-28 RX ORDER — ATORVASTATIN CALCIUM 20 MG/1
20 TABLET, FILM COATED ORAL DAILY
Qty: 90 TABLET | Refills: 1 | Status: SHIPPED | OUTPATIENT
Start: 2024-10-28

## 2024-10-28 NOTE — TELEPHONE ENCOUNTER
This patient needs the following medications he is out of both now, he uses [facility], benicar is 40 mg 1 per day the rx in the chart is written by dr hernadez patient said that dr rueda was supposed to send in a new rx  he also needs a regular refill for lipitor 20 mg 90 days

## 2024-10-29 RX ORDER — OLMESARTAN MEDOXOMIL 40 MG/1
20 TABLET ORAL DAILY
Qty: 45 TABLET | Refills: 0 | Status: SHIPPED | OUTPATIENT
Start: 2024-10-29

## 2024-11-02 ENCOUNTER — HOSPITAL ENCOUNTER (OUTPATIENT)
Dept: MRI IMAGING | Facility: HOSPITAL | Age: 74
Discharge: HOME | End: 2024-11-02
Payer: MEDICARE

## 2024-11-02 DIAGNOSIS — M48.062 SPINAL STENOSIS OF LUMBAR REGION WITH NEUROGENIC CLAUDICATION: ICD-10-CM

## 2024-11-02 DIAGNOSIS — G89.29 CHRONIC BILATERAL LOW BACK PAIN WITH RIGHT-SIDED SCIATICA: ICD-10-CM

## 2024-11-02 DIAGNOSIS — M54.41 CHRONIC BILATERAL LOW BACK PAIN WITH RIGHT-SIDED SCIATICA: ICD-10-CM

## 2024-11-02 PROCEDURE — 72148 MRI LUMBAR SPINE W/O DYE: CPT

## 2024-11-19 ENCOUNTER — OFFICE VISIT (OUTPATIENT)
Dept: FAMILY MEDICINE CLINIC | Facility: CLINIC | Age: 74
End: 2024-11-19
Payer: MEDICARE

## 2024-11-19 VITALS
BODY MASS INDEX: 31.72 KG/M2 | HEART RATE: 78 BPM | OXYGEN SATURATION: 96 % | WEIGHT: 227.4 LBS | TEMPERATURE: 98.7 F | SYSTOLIC BLOOD PRESSURE: 100 MMHG | DIASTOLIC BLOOD PRESSURE: 66 MMHG | RESPIRATION RATE: 20 BRPM

## 2024-11-19 DIAGNOSIS — M47.816 ARTHRITIS OF LUMBAR SPINE: Primary | ICD-10-CM

## 2024-11-19 PROCEDURE — 3078F DIAST BP <80 MM HG: CPT | Performed by: STUDENT IN AN ORGANIZED HEALTH CARE EDUCATION/TRAINING PROGRAM

## 2024-11-19 PROCEDURE — 3044F HG A1C LEVEL LT 7.0%: CPT | Performed by: STUDENT IN AN ORGANIZED HEALTH CARE EDUCATION/TRAINING PROGRAM

## 2024-11-19 PROCEDURE — 3074F SYST BP LT 130 MM HG: CPT | Performed by: STUDENT IN AN ORGANIZED HEALTH CARE EDUCATION/TRAINING PROGRAM

## 2024-11-19 PROCEDURE — 1160F RVW MEDS BY RX/DR IN RCRD: CPT | Performed by: STUDENT IN AN ORGANIZED HEALTH CARE EDUCATION/TRAINING PROGRAM

## 2024-11-19 PROCEDURE — 1126F AMNT PAIN NOTED NONE PRSNT: CPT | Performed by: STUDENT IN AN ORGANIZED HEALTH CARE EDUCATION/TRAINING PROGRAM

## 2024-11-19 PROCEDURE — 99214 OFFICE O/P EST MOD 30 MIN: CPT | Performed by: STUDENT IN AN ORGANIZED HEALTH CARE EDUCATION/TRAINING PROGRAM

## 2024-11-19 PROCEDURE — 1159F MED LIST DOCD IN RCRD: CPT | Performed by: STUDENT IN AN ORGANIZED HEALTH CARE EDUCATION/TRAINING PROGRAM

## 2024-11-19 RX ORDER — OLMESARTAN MEDOXOMIL 20 MG/1
10 TABLET ORAL DAILY
Qty: 90 TABLET | Refills: 0 | Status: SHIPPED | OUTPATIENT
Start: 2024-11-19

## 2024-11-19 NOTE — PROGRESS NOTES
Chief Complaint  TOMI (F/u)    History of Present Illness            Htn  Bp at home he has one reading is 107/70. He reports not having any significant highs or lows.       Tomi  Improved on last check        Chronic pain  Mri on file. She says the tramadol helps, but makes him sleepy.     The following portions of the patient's history were reviewed and updated as appropriate: allergies, current medications, past family history, past medical history, past social history, past surgical history, and problem list.    OBJECTIVE:  /66 (BP Location: Right arm, Patient Position: Sitting, Cuff Size: Adult)   Pulse 78   Temp 98.7 °F (37.1 °C) (Infrared)   Resp 20   Wt 103 kg (227 lb 6.4 oz)   SpO2 96%   BMI 31.72 kg/m²       Physical Exam  Constitutional:       General: He is not in acute distress.     Appearance: Normal appearance.   HENT:      Head: Normocephalic and atraumatic.   Eyes:      Extraocular Movements: Extraocular movements intact.   Cardiovascular:      Rate and Rhythm: Normal rate and regular rhythm.      Heart sounds: No murmur heard.  Pulmonary:      Effort: Pulmonary effort is normal. No respiratory distress.      Breath sounds: Normal breath sounds. No stridor. No wheezing, rhonchi or rales.   Skin:     Findings: No rash.   Neurological:      General: No focal deficit present.      Mental Status: He is alert.   Psychiatric:         Mood and Affect: Mood normal.                  Assessment and Plan   Diagnoses and all orders for this visit:    1. Arthritis of lumbar spine (Primary)  -     Cancel: Ambulatory Referral to Pain Management  -     Ambulatory Referral to Pain Management    Other orders  -     olmesartan (BENICAR) 20 MG tablet; Take 0.5 tablets by mouth Daily.  Dispense: 90 tablet; Refill: 0      Htn  Mostly low readings , given history of falls and continued weight loss will lower benicar to 10 mg daily. Patient agreeable and will monitor bp at home every other day for goal 120/80.      Avoid tramadol for long term use given sleepiness. Avoid NSAIDs to preserve kidney function. He says tylenol does help otc. He says he hasn't taken the tylenol more than a few times as he does not want to be on meds for this. I asked him to try lidocaine patches. His pain has been improving with weight loss. He says the pain doesn't stop him from doing anything and he is okay with the plan.     Return in about 3 months (around 2/19/2025).       Misty Mai D.O.  [facility]

## 2025-01-08 ENCOUNTER — PRIOR AUTHORIZATION (OUTPATIENT)
Dept: FAMILY MEDICINE CLINIC | Facility: CLINIC | Age: 75
End: 2025-01-08
Payer: MEDICARE

## 2025-01-08 NOTE — TELEPHONE ENCOUNTER
PA sent to germán nelson   (Key: [id number])  Ozempic (0.25 or 0.5 MG/DOSE) 2MG/3ML pen-injectors    Message from Plan  Available without authorization. The member is able to fill the requested drug at the pharmacy. If coverage is still needed or requesting prior to the expiration of a current authorization, a request can be made by sending a fax or calling the number on the back of the member's ID card.

## 2025-01-27 RX ORDER — SEMAGLUTIDE 0.68 MG/ML
INJECTION, SOLUTION SUBCUTANEOUS
Qty: 3 ML | Refills: 0 | Status: SHIPPED | OUTPATIENT
Start: 2025-01-27

## 2025-02-28 DIAGNOSIS — N40.1 BPH WITH OBSTRUCTION/LOWER URINARY TRACT SYMPTOMS: ICD-10-CM

## 2025-02-28 DIAGNOSIS — N13.8 BPH WITH OBSTRUCTION/LOWER URINARY TRACT SYMPTOMS: ICD-10-CM

## 2025-02-28 RX ORDER — FINASTERIDE 5 MG/1
5 TABLET, FILM COATED ORAL DAILY
Qty: 90 TABLET | Refills: 3 | Status: SHIPPED | OUTPATIENT
Start: 2025-02-28

## 2025-03-14 ENCOUNTER — OFFICE VISIT (OUTPATIENT)
Dept: FAMILY MEDICINE CLINIC | Facility: CLINIC | Age: 75
End: 2025-03-14
Payer: MEDICARE

## 2025-03-14 VITALS
HEART RATE: 68 BPM | SYSTOLIC BLOOD PRESSURE: 150 MMHG | HEIGHT: 72 IN | DIASTOLIC BLOOD PRESSURE: 80 MMHG | TEMPERATURE: 98 F | OXYGEN SATURATION: 98 % | WEIGHT: 205.6 LBS | BODY MASS INDEX: 27.85 KG/M2

## 2025-03-14 DIAGNOSIS — R21 RASH AND NONSPECIFIC SKIN ERUPTION: ICD-10-CM

## 2025-03-14 DIAGNOSIS — Z86.19 HISTORY OF STAPH INFECTION: ICD-10-CM

## 2025-03-14 DIAGNOSIS — I10 ESSENTIAL HYPERTENSION: Primary | ICD-10-CM

## 2025-03-14 RX ORDER — MUPIROCIN 20 MG/G
1 OINTMENT TOPICAL 3 TIMES DAILY
COMMUNITY
End: 2025-03-14 | Stop reason: SDUPTHER

## 2025-03-14 RX ORDER — MUPIROCIN 20 MG/G
1 OINTMENT TOPICAL 3 TIMES DAILY
Qty: 30 G | Refills: 3 | Status: SHIPPED | OUTPATIENT
Start: 2025-03-14

## 2025-03-14 RX ORDER — OLMESARTAN MEDOXOMIL 20 MG/1
10 TABLET ORAL DAILY
Qty: 45 TABLET | Refills: 1 | Status: SHIPPED | OUTPATIENT
Start: 2025-03-14

## 2025-03-14 NOTE — PROGRESS NOTES
Justus Bishop is a 74 y.o. male who presents today for Rash      HPI     Patient has chronic rash and wounds on his body that has been evaluated over the years and has had biopsies and swabs that have all come back as staph infection. They have given him antibiotics for years and he has not had any in about 18 months to two years and has seen some improvement. At his last visit they decreased his benicar to 10mg due to low blood pressure but he ran out about a month ago. He has not been checking his blood pressure at home. He stopped taking ozempic due to cost.     Review of Systems   Constitutional:  Negative for fever and unexpected weight loss.   HENT:  Negative for congestion, ear pain and sore throat.    Eyes:  Negative for visual disturbance.   Respiratory:  Negative for cough, shortness of breath and wheezing.    Cardiovascular:  Negative for chest pain and palpitations.   Gastrointestinal:  Negative for abdominal pain, blood in stool, constipation, diarrhea, nausea, vomiting and GERD.   Endocrine: Negative for polydipsia and polyuria.   Genitourinary:  Negative for difficulty urinating.   Musculoskeletal:  Positive for arthralgias (chronic) and neck pain (chronic). Negative for joint swelling.   Skin:  Positive for rash (chronic). Negative for skin lesions.   Allergic/Immunologic: Negative for environmental allergies.   Neurological:  Negative for seizures and syncope.   Hematological:  Does not bruise/bleed easily.   Psychiatric/Behavioral:  Negative for suicidal ideas.         The following portions of the patient's history were reviewed and updated as appropriate: allergies, current medications, past family history, past medical history, past social history, past surgical history and problem list.    Current Outpatient Medications on File Prior to Visit   Medication Sig Dispense Refill    albuterol sulfate  (90 Base) MCG/ACT inhaler INHALE TWO PUFFS BY MOUTH EVERY 4 HOURS AS NEEDED FOR  "WHEEZING 18 g 2    atorvastatin (LIPITOR) 20 MG tablet Take 1 tablet by mouth Daily. 90 tablet 1    finasteride (PROSCAR) 5 MG tablet TAKE 1 TABLET BY MOUTH DAILY 90 tablet 3    ketoconazole (NIZORAL) 2 % shampoo Apply  topically to the appropriate area as directed 1 (One) Time Per Week.      [DISCONTINUED] mupirocin (BACTROBAN) 2 % ointment Apply 1 Application topically to the appropriate area as directed 3 (Three) Times a Day.      [DISCONTINUED] Breo Ellipta 200-25 MCG/ACT inhaler INHALE ONE DOSE BY MOUTH DAILY (Patient not taking: Reported on 3/14/2025) 60 each 2    [DISCONTINUED] olmesartan (BENICAR) 20 MG tablet Take 0.5 tablets by mouth Daily. (Patient not taking: Reported on 3/14/2025) 90 tablet 0    [DISCONTINUED] Semaglutide,0.25 or 0.5MG/DOS, (Ozempic, 0.25 or 0.5 MG/DOSE,) 2 MG/3ML solution pen-injector INJECT 0.5 MG UNDER THE SKIN INTO THE APPROPRIATE AREA AS DIRECTED 1 TIME PER WEEK 3 mL 0     No current facility-administered medications on file prior to visit.       No Known Allergies     Visit Vitals  /80   Pulse 68   Temp 98 °F (36.7 °C) (Infrared)   Ht 182.9 cm (72\")   Wt 93.3 kg (205 lb 9.6 oz)   SpO2 98%   BMI 27.88 kg/m²        Physical Exam  Constitutional:       General: He is not in acute distress.     Appearance: He is well-developed. He is not diaphoretic.   HENT:      Head: Atraumatic.   Cardiovascular:      Rate and Rhythm: Normal rate and regular rhythm.      Heart sounds: Normal heart sounds. No murmur heard.     No friction rub. No gallop.   Pulmonary:      Effort: Pulmonary effort is normal. No respiratory distress.      Breath sounds: Normal breath sounds. No stridor. No wheezing, rhonchi or rales.   Musculoskeletal:      Cervical back: Normal range of motion and neck supple.   Skin:     General: Skin is warm and dry.      Findings: Rash (Multiple ulcerate lesions in various stages of healing on face, arms, trunk, and legs. No erythema, drainage, or heat to touch) present. "   Neurological:      Mental Status: He is alert and oriented to person, place, and time.   Psychiatric:         Behavior: Behavior normal.          Results for orders placed or performed in visit on 10/22/24   Basic metabolic panel    Collection Time: 10/22/24  9:59 AM    Specimen: Blood   Result Value Ref Range    Glucose 67 65 - 99 mg/dL    BUN 11 8 - 23 mg/dL    Creatinine 1.14 0.76 - 1.27 mg/dL    Sodium 139 136 - 145 mmol/L    Potassium 3.7 3.5 - 5.2 mmol/L    Chloride 100 98 - 107 mmol/L    CO2 24.4 22.0 - 29.0 mmol/L    Calcium 9.6 8.6 - 10.5 mg/dL    BUN/Creatinine Ratio 9.6 7.0 - 25.0    Anion Gap 14.6 5.0 - 15.0 mmol/L    eGFR 67.5 >60.0 mL/min/1.73        Problems Addressed this Visit          Cardiac and Vasculature    Essential hypertension - Primary    Hypertension is uncontrolled  Will restart Benicar 10 mg daily and patient was encouraged to monitor blood pressure at home.  Blood pressure will be reassessedat the next regular appointment.         Relevant Medications    olmesartan (BENICAR) 20 MG tablet       Infectious Diseases    History of staph infection    Relevant Medications    mupirocin (BACTROBAN) 2 % ointment    Other Relevant Orders    Ambulatory Referral to Dermatology (Completed)       Skin    Rash and nonspecific skin eruption    Chronic recurrent skin lesions with resultant ulceration.  Patient has history of staph infection but current lesions did not show signs of infection such as heat to the touch, erythema, discharge, or tenderness.  Patient can continue to use mupirocin topically.  Patient is given referral to dermatology for further evaluation and treatment.  RTC/ED precautions given.         Relevant Medications    mupirocin (BACTROBAN) 2 % ointment    Other Relevant Orders    Ambulatory Referral to Dermatology (Completed)     Diagnoses         Codes Comments      Essential hypertension    -  Primary ICD-10-CM: I10  ICD-9-CM: 401.9       Rash and nonspecific skin eruption      ICD-10-CM: R21  ICD-9-CM: 782.1       History of staph infection     ICD-10-CM: Z86.19  ICD-9-CM: V12.09             Return for Next scheduled follow up.    30 minutes was spent on this patient encounter which included history taking, physical exam, answering patient questions, counseling, discussing treatment plan, placing orders, and documentation.    Jeramy Wallis MD   3/14/2025

## 2025-03-14 NOTE — ASSESSMENT & PLAN NOTE
Hypertension is uncontrolled  Will restart Benicar 10 mg daily and patient was encouraged to monitor blood pressure at home.  Blood pressure will be reassessedat the next regular appointment.

## 2025-03-14 NOTE — ASSESSMENT & PLAN NOTE
Chronic recurrent skin lesions with resultant ulceration.  Patient has history of staph infection but current lesions did not show signs of infection such as heat to the touch, erythema, discharge, or tenderness.  Patient can continue to use mupirocin topically.  Patient is given referral to dermatology for further evaluation and treatment.  RTC/ED precautions given.   dysphagia and rectal discomfort

## 2025-03-17 ENCOUNTER — TRANSCRIBE ORDERS (OUTPATIENT)
Dept: LAB | Facility: HOSPITAL | Age: 75
End: 2025-03-17
Payer: MEDICARE

## 2025-03-17 ENCOUNTER — LAB (OUTPATIENT)
Dept: LAB | Facility: HOSPITAL | Age: 75
End: 2025-03-17
Payer: MEDICARE

## 2025-03-17 DIAGNOSIS — L30.9 DERMATITIS: Primary | ICD-10-CM

## 2025-03-17 DIAGNOSIS — L30.9 DERMATITIS: ICD-10-CM

## 2025-03-17 PROCEDURE — 87205 SMEAR GRAM STAIN: CPT

## 2025-03-17 PROCEDURE — 87070 CULTURE OTHR SPECIMN AEROBIC: CPT

## 2025-03-21 LAB
BACTERIA SPEC AEROBE CULT: NORMAL
GRAM STN SPEC: NORMAL

## 2025-03-22 ENCOUNTER — TELEMEDICINE (OUTPATIENT)
Dept: FAMILY MEDICINE CLINIC | Facility: CLINIC | Age: 75
End: 2025-03-22
Payer: MEDICARE

## 2025-03-22 DIAGNOSIS — I10 ESSENTIAL HYPERTENSION: ICD-10-CM

## 2025-03-22 DIAGNOSIS — R79.9 ABNORMAL FINDING OF BLOOD CHEMISTRY, UNSPECIFIED: ICD-10-CM

## 2025-03-22 DIAGNOSIS — E78.5 HYPERLIPIDEMIA LDL GOAL <100: ICD-10-CM

## 2025-03-22 PROBLEM — T88.7XXA MEDICATION SIDE EFFECT: Status: RESOLVED | Noted: 2024-09-16 | Resolved: 2025-03-22

## 2025-03-22 PROBLEM — N17.9 AKI (ACUTE KIDNEY INJURY): Status: RESOLVED | Noted: 2024-10-19 | Resolved: 2025-03-22

## 2025-03-22 PROBLEM — I95.9 HYPOTENSION: Status: RESOLVED | Noted: 2024-09-16 | Resolved: 2025-03-22

## 2025-03-22 RX ORDER — ATORVASTATIN CALCIUM 20 MG/1
20 TABLET, FILM COATED ORAL DAILY
Qty: 90 TABLET | Refills: 1 | Status: SHIPPED | OUTPATIENT
Start: 2025-03-22

## 2025-03-22 NOTE — PROGRESS NOTES
You have chosen to receive care through a telehealth visit.  Do you consent to use a video/audio connection for your medical care today? Yes       Patient and provider in [place]    Chief Complaint  Justus Bishop is a 74 y.o. male who presents via video-conference for follow up    History of Present Illness        Htn  Benicar was restarted by Dr. Wallis. He stopped taking the ozempic and his blood pressure went back up. He stopped ozempic right after [place]. He says he was also temporarily out of his benicar. He has since resumed his benicar. Bp 137/72 this morning. He has been taking it,  but does not have any other readings. No complaints of dizziness.       Hld  He is taking statin.    The patient says his weight has become stable at about 200 lbs so he says with weight loss his back pain resolved. He cancelled the pain management appointment given this.     Rash  He says the rash is completely gone from the scalp. He went to see Dr. Rochelle Richey at dermatology.     The following portions of the patient's history were reviewed and updated as appropriate: allergies, current medications, past family history, past medical history, past social history, past surgical history, and problem list.        Objective  Vital signs available: No      Assessment/Plan   Htn  Hld        Continue current meds.   He will come back for labs.   He will monitor bp at home for goal 120/80 and will seek care for any bp that goes below 100 or above 150 systolic.     I asked him to schedule a wellness in 6 months.         Misty Mai D.O.  [facility] [place]     15 minutes were spent reviewing the patient's questionnaire, formulating a treatment plan, and relaying information to the patient via [employer].

## 2025-05-21 ENCOUNTER — TELEPHONE (OUTPATIENT)
Dept: FAMILY MEDICINE CLINIC | Facility: CLINIC | Age: 75
End: 2025-05-21
Payer: MEDICARE

## 2025-05-21 NOTE — TELEPHONE ENCOUNTER
HUB TO RELAY FYI WE RECEIVED A MEDICAL EMERGENCY CERT FROM [email or web address] THIS HAS BEEN PRINTED AND GIVEN TO PCP

## 2025-05-21 NOTE — TELEPHONE ENCOUNTER
Name: LACIE GILBERT    Relationship: Emergency Contact    Best Callback Number:      [phone] (Mobile)     HUB PROVIDED THE RELAY MESSAGE FROM THE OFFICE   PATIENT     HAS FURTHER QUESTIONS AND WOULD LIKE A CALL BACK    ADDITIONAL INFORMATION:     CALLER REQUESTED A CALL BACK WHEN DR SANDS COMPLETES THE FORM FROM [facility] AND RETURN FAXES THE FORM    CALLER REITERATED THE URGENCY FOR PATIENT/HER FATHER TO HAVE RUNNING WATER AGAIN

## 2025-07-08 ENCOUNTER — OFFICE VISIT (OUTPATIENT)
Dept: FAMILY MEDICINE CLINIC | Facility: CLINIC | Age: 75
End: 2025-07-08
Payer: MEDICARE

## 2025-07-08 ENCOUNTER — LAB (OUTPATIENT)
Dept: LAB | Facility: HOSPITAL | Age: 75
End: 2025-07-08
Payer: MEDICARE

## 2025-07-08 VITALS
DIASTOLIC BLOOD PRESSURE: 72 MMHG | WEIGHT: 232 LBS | HEIGHT: 72 IN | BODY MASS INDEX: 31.42 KG/M2 | OXYGEN SATURATION: 96 % | SYSTOLIC BLOOD PRESSURE: 128 MMHG | HEART RATE: 74 BPM | RESPIRATION RATE: 20 BRPM

## 2025-07-08 DIAGNOSIS — R21 RASH AND NONSPECIFIC SKIN ERUPTION: ICD-10-CM

## 2025-07-08 DIAGNOSIS — M54.2 ACUTE NECK PAIN: Primary | ICD-10-CM

## 2025-07-08 DIAGNOSIS — I10 ESSENTIAL HYPERTENSION: ICD-10-CM

## 2025-07-08 DIAGNOSIS — R22.1 NECK SWELLING: ICD-10-CM

## 2025-07-08 DIAGNOSIS — E78.5 HYPERLIPIDEMIA LDL GOAL <100: ICD-10-CM

## 2025-07-08 DIAGNOSIS — R79.9 ABNORMAL FINDING OF BLOOD CHEMISTRY, UNSPECIFIED: ICD-10-CM

## 2025-07-08 DIAGNOSIS — M54.2 ACUTE NECK PAIN: ICD-10-CM

## 2025-07-08 DIAGNOSIS — R19.7 DIARRHEA, UNSPECIFIED TYPE: ICD-10-CM

## 2025-07-08 LAB
ALBUMIN SERPL-MCNC: 3.4 G/DL (ref 3.5–5.2)
ALBUMIN/GLOB SERPL: 0.9 G/DL
ALP SERPL-CCNC: 64 U/L (ref 39–117)
ALT SERPL W P-5'-P-CCNC: 7 U/L (ref 1–41)
ANION GAP SERPL CALCULATED.3IONS-SCNC: 12.9 MMOL/L (ref 5–15)
AST SERPL-CCNC: 10 U/L (ref 1–40)
BASOPHILS # BLD AUTO: 0.12 10*3/MM3 (ref 0–0.2)
BASOPHILS NFR BLD AUTO: 0.8 % (ref 0–1.5)
BILIRUB SERPL-MCNC: 0.3 MG/DL (ref 0–1.2)
BUN SERPL-MCNC: 10 MG/DL (ref 8–23)
BUN/CREAT SERPL: 14.9 (ref 7–25)
CALCIUM SPEC-SCNC: 9.1 MG/DL (ref 8.6–10.5)
CHLORIDE SERPL-SCNC: 98 MMOL/L (ref 98–107)
CHOLEST SERPL-MCNC: 111 MG/DL (ref 0–200)
CK SERPL-CCNC: 61 U/L (ref 20–200)
CO2 SERPL-SCNC: 26.1 MMOL/L (ref 22–29)
CREAT SERPL-MCNC: 0.67 MG/DL (ref 0.76–1.27)
CRP SERPL-MCNC: 5.42 MG/DL (ref 0–0.5)
DEPRECATED RDW RBC AUTO: 43.6 FL (ref 37–54)
EGFRCR SERPLBLD CKD-EPI 2021: 98 ML/MIN/1.73
EOSINOPHIL # BLD AUTO: 0.17 10*3/MM3 (ref 0–0.4)
EOSINOPHIL NFR BLD AUTO: 1.2 % (ref 0.3–6.2)
ERYTHROCYTE [DISTWIDTH] IN BLOOD BY AUTOMATED COUNT: 12.8 % (ref 12.3–15.4)
ERYTHROCYTE [SEDIMENTATION RATE] IN BLOOD: 33 MM/HR (ref 0–20)
GLOBULIN UR ELPH-MCNC: 3.8 GM/DL
GLUCOSE SERPL-MCNC: 74 MG/DL (ref 65–99)
HBA1C MFR BLD: 6.2 % (ref 4.8–5.6)
HCT VFR BLD AUTO: 48.1 % (ref 37.5–51)
HDLC SERPL-MCNC: 30 MG/DL (ref 40–60)
HGB BLD-MCNC: 16.4 G/DL (ref 13–17.7)
IMM GRANULOCYTES # BLD AUTO: 0.04 10*3/MM3 (ref 0–0.05)
IMM GRANULOCYTES NFR BLD AUTO: 0.3 % (ref 0–0.5)
LDLC SERPL CALC-MCNC: 67 MG/DL (ref 0–100)
LDLC/HDLC SERPL: 2.27 {RATIO}
LYMPHOCYTES # BLD AUTO: 3.11 10*3/MM3 (ref 0.7–3.1)
LYMPHOCYTES NFR BLD AUTO: 21.4 % (ref 19.6–45.3)
MCH RBC QN AUTO: 31.9 PG (ref 26.6–33)
MCHC RBC AUTO-ENTMCNC: 34.1 G/DL (ref 31.5–35.7)
MCV RBC AUTO: 93.6 FL (ref 79–97)
MONOCYTES # BLD AUTO: 1.79 10*3/MM3 (ref 0.1–0.9)
MONOCYTES NFR BLD AUTO: 12.3 % (ref 5–12)
NEUTROPHILS NFR BLD AUTO: 64 % (ref 42.7–76)
NEUTROPHILS NFR BLD AUTO: 9.31 10*3/MM3 (ref 1.7–7)
NRBC BLD AUTO-RTO: 0 /100 WBC (ref 0–0.2)
PLATELET # BLD AUTO: 355 10*3/MM3 (ref 140–450)
PMV BLD AUTO: 9.3 FL (ref 6–12)
POTASSIUM SERPL-SCNC: 3.1 MMOL/L (ref 3.5–5.2)
PROT SERPL-MCNC: 7.2 G/DL (ref 6–8.5)
RBC # BLD AUTO: 5.14 10*6/MM3 (ref 4.14–5.8)
SODIUM SERPL-SCNC: 137 MMOL/L (ref 136–145)
TRIGL SERPL-MCNC: 64 MG/DL (ref 0–150)
TSH SERPL DL<=0.05 MIU/L-ACNC: 1.68 UIU/ML (ref 0.27–4.2)
VIT B12 BLD-MCNC: 249 PG/ML (ref 211–946)
VLDLC SERPL-MCNC: 14 MG/DL (ref 5–40)
WBC NRBC COR # BLD AUTO: 14.54 10*3/MM3 (ref 3.4–10.8)

## 2025-07-08 PROCEDURE — 1159F MED LIST DOCD IN RCRD: CPT | Performed by: PHYSICIAN ASSISTANT

## 2025-07-08 PROCEDURE — 1125F AMNT PAIN NOTED PAIN PRSNT: CPT | Performed by: PHYSICIAN ASSISTANT

## 2025-07-08 PROCEDURE — 83036 HEMOGLOBIN GLYCOSYLATED A1C: CPT

## 2025-07-08 PROCEDURE — 99214 OFFICE O/P EST MOD 30 MIN: CPT | Performed by: PHYSICIAN ASSISTANT

## 2025-07-08 PROCEDURE — 86038 ANTINUCLEAR ANTIBODIES: CPT

## 2025-07-08 PROCEDURE — 85025 COMPLETE CBC W/AUTO DIFF WBC: CPT

## 2025-07-08 PROCEDURE — 36415 COLL VENOUS BLD VENIPUNCTURE: CPT

## 2025-07-08 PROCEDURE — 85652 RBC SED RATE AUTOMATED: CPT

## 2025-07-08 PROCEDURE — 82550 ASSAY OF CK (CPK): CPT

## 2025-07-08 PROCEDURE — 80053 COMPREHEN METABOLIC PANEL: CPT

## 2025-07-08 PROCEDURE — 3074F SYST BP LT 130 MM HG: CPT | Performed by: PHYSICIAN ASSISTANT

## 2025-07-08 PROCEDURE — 84443 ASSAY THYROID STIM HORMONE: CPT

## 2025-07-08 PROCEDURE — 1160F RVW MEDS BY RX/DR IN RCRD: CPT | Performed by: PHYSICIAN ASSISTANT

## 2025-07-08 PROCEDURE — 80061 LIPID PANEL: CPT

## 2025-07-08 PROCEDURE — 82607 VITAMIN B-12: CPT

## 2025-07-08 PROCEDURE — 86140 C-REACTIVE PROTEIN: CPT

## 2025-07-08 PROCEDURE — 3078F DIAST BP <80 MM HG: CPT | Performed by: PHYSICIAN ASSISTANT

## 2025-07-08 NOTE — PROGRESS NOTES
"     Follow Up Office Visit      Patient Name: Justus Bishop  : 1950   MRN: [id number]     Chief Complaint:    Chief Complaint   Patient presents with   • Neck Pain     Neck pain and stiffness that started a little over a week ago. Pt denies any injury        History of Present Illness  74-year-old male presents for evaluation of neck pain.     Patient states he has been experiencing neck pain and the sensation of inflammation and swelling in his neck for several days, though it was at its worst approximately 2 days ago and has since improved somewhat.  The pain extends from the base of his skull to the tops of his shoulders, though is mostly localized to his neck.  He also reports excessive phlegm production which she has been managing by expectoration.  States he had some difficulty swallowing approximately 2 days ago, though this has had improved as he expels more phlegm.    In addition to the symptoms, he has been experiencing frequent diarrhea, though also states this seems to be improving somewhat.  He has been treating this with Imodium as needed.  He reports a history of IBS-D.    He reports a headache, though states this is very mild.  He denies any known fever, though does report an episode of a \"hot flash\" a few days ago.  He denies any nausea, vomiting, lightheadedness, dizziness, syncope, chest pain, shortness of breath, or focal neurologic deficit.  He denies any known injury or trauma.    He reports significant limitation in neck mobility, particularly when turning his head side-to-side or up and down.  He reports significant tenderness to palpation diffusely.    He has a long-standing history of skin issues mostly localized to his face and head, dating back 5 to 6 years, and has consulted several specialist over the years, but no definitive diagnosis has been made. A recent swab and plug biopsy performed by Dr. Rochelle Richey were normal, and his condition has improved significantly " compared to a year ago. He was informed that his condition might be autoimmune in nature, but this has not been confirmed.  He is uncertain whether or not this is associated with the symptoms.        Subjective      I have reviewed and the following portions of the patient's history were updated as appropriate: past family history, past medical history, past social history, past surgical history and problem list.    Medications:     Current Outpatient Medications:   •  albuterol sulfate  (90 Base) MCG/ACT inhaler, INHALE TWO PUFFS BY MOUTH EVERY 4 HOURS AS NEEDED FOR WHEEZING, Disp: 18 g, Rfl: 2  •  atorvastatin (LIPITOR) 20 MG tablet, Take 1 tablet by mouth Daily., Disp: 90 tablet, Rfl: 1  •  finasteride (PROSCAR) 5 MG tablet, TAKE 1 TABLET BY MOUTH DAILY, Disp: 90 tablet, Rfl: 3  •  mupirocin (BACTROBAN) 2 % ointment, Apply 1 Application topically to the appropriate area as directed 3 (Three) Times a Day., Disp: 30 g, Rfl: 3  •  olmesartan (BENICAR) 20 MG tablet, Take 0.5 tablets by mouth Daily., Disp: 45 tablet, Rfl: 1  •  ketoconazole (NIZORAL) 2 % shampoo, Apply  topically to the appropriate area as directed 1 (One) Time Per Week., Disp: , Rfl:     Allergies:   No Known Allergies    Objective     Physical Exam:   Physical Exam  Constitutional:       General: He is not in acute distress.     Appearance: He is not ill-appearing.   HENT:      Head: Normocephalic.      Right Ear: Tympanic membrane and ear canal normal.      Left Ear: Tympanic membrane and ear canal normal.      Nose: Nose normal. No congestion or rhinorrhea.      Mouth/Throat:      Mouth: Mucous membranes are moist.      Pharynx: No pharyngeal swelling, oropharyngeal exudate, posterior oropharyngeal erythema or uvula swelling.      Tonsils: No tonsillar exudate or tonsillar abscesses.   Eyes:      Pupils: Pupils are equal, round, and reactive to light.   Neck:      Thyroid: No thyroid mass or thyroid tenderness.      Vascular: No carotid  "bruit or JVD.      Trachea: Trachea normal.   Cardiovascular:      Rate and Rhythm: Normal rate and regular rhythm.      Heart sounds: No murmur heard.  Pulmonary:      Effort: Pulmonary effort is normal. No respiratory distress.      Breath sounds: No wheezing, rhonchi or rales.   Abdominal:      General: Abdomen is flat. There is no distension.      Tenderness: There is no abdominal tenderness.   Musculoskeletal:      Cervical back: No edema. Pain with movement and muscular tenderness present. No spinous process tenderness. Decreased range of motion.   Lymphadenopathy:      Cervical: No cervical adenopathy.   Skin:     General: Skin is warm.   Neurological:      General: No focal deficit present.      Mental Status: He is alert.   Psychiatric:         Mood and Affect: Mood normal.         Vital Signs:   Vitals:    07/08/25 1024   BP: 128/72   Pulse: 74   Resp: 20   SpO2: 96%   Weight: 105 kg (232 lb)   Height: 182.9 cm (72.01\")   PainSc: 7    PainLoc: Neck     Body mass index is 31.46 kg/m².         Procedures    Assessment / Plan         Assessment and Plan     Diagnoses and all orders for this visit:    1. Acute neck pain (Primary)  -     CBC & Differential; Future  -     Comprehensive Metabolic Panel; Future  -     C-reactive Protein; Future  -     Sedimentation Rate; Future  -     CK; Future  -     XR Spine Cervical Complete 4 or 5 View; Future  -     Cancel: CT soft tissue neck w contrast; Future  -     CEDRIC by IFA, Reflex to Titer and Pattern; Future  -     CT soft tissue neck w wo contrast; Future    2. Neck swelling  -     CBC & Differential; Future  -     Comprehensive Metabolic Panel; Future  -     C-reactive Protein; Future  -     Sedimentation Rate; Future  -     CK; Future  -     Cancel: CT soft tissue neck w contrast; Future  -     CEDRIC by IFA, Reflex to Titer and Pattern; Future  -     CT soft tissue neck w wo contrast; Future    3. Rash and nonspecific skin eruption  -     CEDRIC by IFA, Reflex to Titer " and Pattern; Future    4. Diarrhea, unspecified type      Etiology of symptoms unclear at this time.  We discussed broad differential including but not limited to cervical muscle sprain/strain, torticollis, degenerative changes or radiculopathy, vasculitis, abscess, lymphadenopathy, dissection or aneurysm, meningitis.     Have low suspicion for infectious process as patient is not experiencing any systemic symptoms such as fever, chills, night sweats.  Low suspicion for acute injury due to lack of reported trauma.  Reassured as patient does not have any obvious neurologic deficit or associated severe headache.    Workup will be broad with labs, x-ray, and stat CT imaging.  Further plan of care pending results.    We had a lengthy discussion regarding strict ER precautions, patient voices understanding.  Will move forward with ongoing supportive and symptomatic treatment.  Recommend over-the-counter analgesics.  Will hold off on additional prescription medications to mitigate risk of masking warning signs or symptoms.       Follow Up:   Return in about 1 week (around 7/15/2025) for Neck pain.    Patient or patient representative verbalized consent for the use of Ambient Listening during the visit with  Bhumika Flores PA-C for chart documentation. 7/8/2025  10:29 EDT    Bhumika Flores PA-C    [facility] [place]

## 2025-07-09 ENCOUNTER — HOSPITAL ENCOUNTER (OUTPATIENT)
Facility: HOSPITAL | Age: 75
Discharge: HOME | End: 2025-07-09
Admitting: PHYSICIAN ASSISTANT
Payer: MEDICARE

## 2025-07-09 DIAGNOSIS — R22.1 NECK SWELLING: ICD-10-CM

## 2025-07-09 DIAGNOSIS — M54.2 ACUTE NECK PAIN: ICD-10-CM

## 2025-07-09 PROCEDURE — 70492 CT SFT TSUE NCK W/O & W/DYE: CPT

## 2025-07-09 PROCEDURE — 25510000001 IOPAMIDOL 61 % SOLUTION: Performed by: PHYSICIAN ASSISTANT

## 2025-07-09 RX ORDER — IOPAMIDOL 612 MG/ML
100 INJECTION, SOLUTION INTRAVASCULAR
Status: COMPLETED | OUTPATIENT
Start: 2025-07-09 | End: 2025-07-09

## 2025-07-09 RX ADMIN — IOPAMIDOL 75 ML: 612 INJECTION, SOLUTION INTRAVENOUS at 14:42

## 2025-07-10 RX ORDER — POTASSIUM CHLORIDE 750 MG/1
30 TABLET, EXTENDED RELEASE ORAL ONCE
Qty: 3 TABLET | Refills: 0 | Status: SHIPPED | OUTPATIENT
Start: 2025-07-10 | End: 2025-07-10

## 2025-07-11 LAB — ANA SER QL IF: NEGATIVE

## 2025-07-17 ENCOUNTER — OFFICE VISIT (OUTPATIENT)
Dept: FAMILY MEDICINE CLINIC | Facility: CLINIC | Age: 75
End: 2025-07-17
Payer: MEDICARE

## 2025-07-17 ENCOUNTER — LAB (OUTPATIENT)
Dept: LAB | Facility: HOSPITAL | Age: 75
End: 2025-07-17
Payer: MEDICARE

## 2025-07-17 DIAGNOSIS — D72.829 LEUKOCYTOSIS, UNSPECIFIED TYPE: ICD-10-CM

## 2025-07-17 DIAGNOSIS — E87.6 HYPOKALEMIA: ICD-10-CM

## 2025-07-17 DIAGNOSIS — I10 ESSENTIAL HYPERTENSION: ICD-10-CM

## 2025-07-17 DIAGNOSIS — E87.6 HYPOKALEMIA: Primary | ICD-10-CM

## 2025-07-17 DIAGNOSIS — R70.0 ELEVATED SED RATE: ICD-10-CM

## 2025-07-17 DIAGNOSIS — M19.90 ARTHRITIS: ICD-10-CM

## 2025-07-17 LAB
ANION GAP SERPL CALCULATED.3IONS-SCNC: 10.4 MMOL/L (ref 5–15)
BUN SERPL-MCNC: 14 MG/DL (ref 8–23)
BUN/CREAT SERPL: 13.9 (ref 7–25)
CALCIUM SPEC-SCNC: 9.6 MG/DL (ref 8.6–10.5)
CHLORIDE SERPL-SCNC: 101 MMOL/L (ref 98–107)
CO2 SERPL-SCNC: 24.6 MMOL/L (ref 22–29)
CREAT SERPL-MCNC: 1.01 MG/DL (ref 0.76–1.27)
DEPRECATED RDW RBC AUTO: 45.4 FL (ref 37–54)
EGFRCR SERPLBLD CKD-EPI 2021: 78 ML/MIN/1.73
ERYTHROCYTE [DISTWIDTH] IN BLOOD BY AUTOMATED COUNT: 13.4 % (ref 12.3–15.4)
GLUCOSE SERPL-MCNC: 74 MG/DL (ref 65–99)
HCT VFR BLD AUTO: 50 % (ref 37.5–51)
HGB BLD-MCNC: 16.8 G/DL (ref 13–17.7)
MCH RBC QN AUTO: 31.6 PG (ref 26.6–33)
MCHC RBC AUTO-ENTMCNC: 33.6 G/DL (ref 31.5–35.7)
MCV RBC AUTO: 94 FL (ref 79–97)
PLATELET # BLD AUTO: 480 10*3/MM3 (ref 140–450)
PMV BLD AUTO: 8.5 FL (ref 6–12)
POTASSIUM SERPL-SCNC: 4.3 MMOL/L (ref 3.5–5.2)
RBC # BLD AUTO: 5.32 10*6/MM3 (ref 4.14–5.8)
SODIUM SERPL-SCNC: 136 MMOL/L (ref 136–145)
WBC NRBC COR # BLD AUTO: 21.05 10*3/MM3 (ref 3.4–10.8)

## 2025-07-17 PROCEDURE — 82043 UR ALBUMIN QUANTITATIVE: CPT

## 2025-07-17 PROCEDURE — 82570 ASSAY OF URINE CREATININE: CPT

## 2025-07-17 PROCEDURE — 36415 COLL VENOUS BLD VENIPUNCTURE: CPT

## 2025-07-17 PROCEDURE — 85027 COMPLETE CBC AUTOMATED: CPT

## 2025-07-17 PROCEDURE — 80048 BASIC METABOLIC PNL TOTAL CA: CPT

## 2025-07-17 RX ORDER — UBIDECARENONE 30 MG
CAPSULE ORAL
COMMUNITY

## 2025-07-17 RX ORDER — UBIDECARENONE 100 MG
100 CAPSULE ORAL DAILY
COMMUNITY

## 2025-07-17 RX ORDER — POTASSIUM CHLORIDE 750 MG/1
TABLET, EXTENDED RELEASE ORAL
COMMUNITY
Start: 2025-07-10 | End: 2025-07-17 | Stop reason: SDDI

## 2025-07-17 RX ORDER — LANOLIN ALCOHOL/MO/W.PET/CERES
1000 CREAM (GRAM) TOPICAL DAILY
COMMUNITY

## 2025-07-17 RX ORDER — SEMAGLUTIDE 1.34 MG/ML
0.25 INJECTION, SOLUTION SUBCUTANEOUS WEEKLY
Qty: 2 ML | Refills: 0 | Status: SHIPPED | OUTPATIENT
Start: 2025-07-17

## 2025-07-17 NOTE — PROGRESS NOTES
Chief Complaint  neck stiffness (Follow up, would like to discuss ozempic)    History of Present Illness          Neck stiffness  The patient reports his neck is better.     IBS/tm  Patient reports being diagnosed with IBS 30 years ago and would like to be back on ozempic which helped control it and his sugar level.       Hypokalemia  The patient has been taking otc k rather than the script he is taking is k gluconate 550 mg once a day.      The following portions of the patient's history were reviewed and updated as appropriate: allergies, current medications, past family history, past medical history, past social history, past surgical history, and problem list.    OBJECTIVE:  There were no vitals taken for this visit.      Physical Exam  Constitutional:       General: He is not in acute distress.     Appearance: Normal appearance.   HENT:      Head: Normocephalic and atraumatic.   Eyes:      Extraocular Movements: Extraocular movements intact.   Cardiovascular:      Rate and Rhythm: Normal rate and regular rhythm.      Heart sounds: No murmur heard.  Pulmonary:      Effort: Pulmonary effort is normal. No respiratory distress.      Breath sounds: Normal breath sounds. No stridor. No wheezing, rhonchi or rales.   Skin:     Findings: No rash.   Neurological:      General: No focal deficit present.      Mental Status: He is alert.   Psychiatric:         Mood and Affect: Mood normal.                    Assessment and Plan   Diagnoses and all orders for this visit:    1. Hypokalemia (Primary)  -     Basic metabolic panel; Future    2. Leukocytosis, unspecified type  -     CBC (No Diff); Future    3. Elevated sed rate  -     Ambulatory Referral to Rheumatology    4. Arthritis  -     Ambulatory Referral to Rheumatology    Other orders  -     Semaglutide,0.25 or 0.5MG/DOS, (Ozempic, 0.25 or 0.5 MG/DOSE,) 2 MG/1.5ML solution pen-injector; Inject 0.25 mg under the skin into the appropriate area as directed 1 (One) Time Per  Week.  Dispense: 2 mL; Refill: 0      Patient doesn't have a history of pancreatitis, family history of MEN syndrome, thyroid cancer, adrenal or pancreatic cancer, suicidal thoughts.  Discussed this medication can cause vomiting, nausea, constipation, diarrhea, belly pain. Discussed it can also cause pancreatitis, gastroparesis or other severe gi illness which would present as belly pain or vomiting. Advised to go to the er if this happens.counseled on low glucose risk.       Refer to rheum given arthritis, elevated esr.     Return in about 1 month (around 8/17/2025).       Misty Mai D.O.  [facility] [place]

## 2025-07-18 DIAGNOSIS — K12.2 UVULITIS: ICD-10-CM

## 2025-07-18 LAB
ALBUMIN UR-MCNC: <1.2 MG/DL
CREAT UR-MCNC: 56.1 MG/DL
MICROALBUMIN/CREAT UR: NORMAL MG/G{CREAT}

## 2025-08-04 DIAGNOSIS — H91.90 HEARING LOSS, UNSPECIFIED HEARING LOSS TYPE, UNSPECIFIED LATERALITY: Primary | ICD-10-CM

## 2025-08-13 ENCOUNTER — TELEPHONE (OUTPATIENT)
Dept: FAMILY MEDICINE CLINIC | Facility: CLINIC | Age: 75
End: 2025-08-13
Payer: MEDICARE

## (undated) DEVICE — TUBING, SUCTION, 1/4" X 10', STRAIGHT: Brand: MEDLINE

## (undated) DEVICE — TOWEL,OR,DSP,ST,BLUE,STD,4/PK,20PK/CS: Brand: MEDLINE

## (undated) DEVICE — GOWN,NON-REINFORCED,SIRUS,SET IN SLV,XXL: Brand: MEDLINE

## (undated) DEVICE — CUTTING LOOP, BIPOLAR, 24/26 FR.: Brand: N.A.

## (undated) DEVICE — BOWL UTIL STRL 32OZ

## (undated) DEVICE — Device: Brand: AQUABEAM HANDPIECE

## (undated) DEVICE — PK DRP AQUABEAM LITHO 65X69IN

## (undated) DEVICE — SYR CATH/TIP 50ML 2OZ STRL 1P/U

## (undated) DEVICE — COVER,TABLE,HEAVY DUTY,50"X90",STRL: Brand: MEDLINE

## (undated) DEVICE — Y-TYPE TUR/BLADDER IRRIGATION SET, REGULATING CLAMP

## (undated) DEVICE — COVER,MAYO STAND,STERILE: Brand: MEDLINE

## (undated) DEVICE — GLV SURG BIOGEL LTX PF 8

## (undated) DEVICE — JELLY,LUBE,STERILE,FLIP TOP,TUBE,2-OZ: Brand: MEDLINE

## (undated) DEVICE — SYR LUERLOK 30CC

## (undated) DEVICE — SYR LL TP 10ML STRL

## (undated) DEVICE — MARKER,SKIN,W/RULER,DUAL,STOP: Brand: MEDLINE

## (undated) DEVICE — HLDR CATH FOLEY STATLOCK TRICOT PED 3WY

## (undated) DEVICE — CANISTER, RIGID, 2000CC: Brand: MEDLINE INDUSTRIES, INC.

## (undated) DEVICE — CONTAINER,SPECIMEN,OR STERILE,4OZ: Brand: MEDLINE

## (undated) DEVICE — CATH FOL BARDEX HEMATURIA 3WY 22F 30CC